# Patient Record
Sex: FEMALE | Race: BLACK OR AFRICAN AMERICAN | NOT HISPANIC OR LATINO | Employment: UNEMPLOYED | URBAN - METROPOLITAN AREA
[De-identification: names, ages, dates, MRNs, and addresses within clinical notes are randomized per-mention and may not be internally consistent; named-entity substitution may affect disease eponyms.]

---
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
## 2024-04-15 PROBLEM — N94.6 DYSMENORRHEA: Status: ACTIVE | Noted: 2024-04-15

## 2024-05-06 ENCOUNTER — TELEPHONE (OUTPATIENT)
Dept: OBSTETRICS AND GYNECOLOGY | Facility: CLINIC | Age: 19
End: 2024-05-06
Payer: MEDICAID

## 2024-05-06 ENCOUNTER — OFFICE VISIT (OUTPATIENT)
Dept: OBSTETRICS AND GYNECOLOGY | Facility: CLINIC | Age: 19
End: 2024-05-06
Payer: MEDICAID

## 2024-05-06 ENCOUNTER — LAB VISIT (OUTPATIENT)
Dept: LAB | Facility: HOSPITAL | Age: 19
End: 2024-05-06
Attending: ADVANCED PRACTICE MIDWIFE
Payer: MEDICAID

## 2024-05-06 VITALS
BODY MASS INDEX: 25.9 KG/M2 | DIASTOLIC BLOOD PRESSURE: 72 MMHG | HEIGHT: 66 IN | WEIGHT: 161.19 LBS | SYSTOLIC BLOOD PRESSURE: 114 MMHG

## 2024-05-06 DIAGNOSIS — N91.1 SECONDARY AMENORRHEA: Primary | ICD-10-CM

## 2024-05-06 DIAGNOSIS — N91.1 SECONDARY AMENORRHEA: ICD-10-CM

## 2024-05-06 DIAGNOSIS — Z32.01 POSITIVE PREGNANCY TEST: ICD-10-CM

## 2024-05-06 LAB
ABO + RH BLD: NORMAL
BLD GP AB SCN CELLS X3 SERPL QL: NORMAL
ERYTHROCYTE [DISTWIDTH] IN BLOOD BY AUTOMATED COUNT: 21.9 % (ref 11.5–14.5)
HAV IGM SERPL QL IA: NORMAL
HBV CORE IGM SERPL QL IA: NORMAL
HBV SURFACE AG SERPL QL IA: NORMAL
HCT VFR BLD AUTO: 29.8 % (ref 37–48.5)
HCV AB SERPL QL IA: NORMAL
HGB BLD-MCNC: 8.6 G/DL (ref 12–16)
HGB S BLD QL SOLY: NEGATIVE
HIV 1+2 AB+HIV1 P24 AG SERPL QL IA: NORMAL
MCH RBC QN AUTO: 20.4 PG (ref 27–31)
MCHC RBC AUTO-ENTMCNC: 28.9 G/DL (ref 32–36)
MCV RBC AUTO: 71 FL (ref 82–98)
PLATELET # BLD AUTO: 347 K/UL (ref 150–450)
PMV BLD AUTO: 11.3 FL (ref 9.2–12.9)
RBC # BLD AUTO: 4.22 M/UL (ref 4–5.4)
SPECIMEN OUTDATE: NORMAL
TREPONEMA PALLIDUM IGG+IGM AB [PRESENCE] IN SERUM OR PLASMA BY IMMUNOASSAY: NONREACTIVE
WBC # BLD AUTO: 6.64 K/UL (ref 3.9–12.7)

## 2024-05-06 PROCEDURE — 1159F MED LIST DOCD IN RCRD: CPT | Mod: CPTII,,, | Performed by: ADVANCED PRACTICE MIDWIFE

## 2024-05-06 PROCEDURE — 99999 PR PBB SHADOW E&M-EST. PATIENT-LVL II: CPT | Mod: PBBFAC,,, | Performed by: ADVANCED PRACTICE MIDWIFE

## 2024-05-06 PROCEDURE — 85027 COMPLETE CBC AUTOMATED: CPT | Performed by: ADVANCED PRACTICE MIDWIFE

## 2024-05-06 PROCEDURE — 86593 SYPHILIS TEST NON-TREP QUANT: CPT | Performed by: ADVANCED PRACTICE MIDWIFE

## 2024-05-06 PROCEDURE — 87086 URINE CULTURE/COLONY COUNT: CPT | Performed by: ADVANCED PRACTICE MIDWIFE

## 2024-05-06 PROCEDURE — 99212 OFFICE O/P EST SF 10 MIN: CPT | Mod: PBBFAC,TH | Performed by: ADVANCED PRACTICE MIDWIFE

## 2024-05-06 PROCEDURE — 86762 RUBELLA ANTIBODY: CPT | Performed by: ADVANCED PRACTICE MIDWIFE

## 2024-05-06 PROCEDURE — 3074F SYST BP LT 130 MM HG: CPT | Mod: CPTII,,, | Performed by: ADVANCED PRACTICE MIDWIFE

## 2024-05-06 PROCEDURE — 3008F BODY MASS INDEX DOCD: CPT | Mod: CPTII,,, | Performed by: ADVANCED PRACTICE MIDWIFE

## 2024-05-06 PROCEDURE — 80074 ACUTE HEPATITIS PANEL: CPT | Performed by: ADVANCED PRACTICE MIDWIFE

## 2024-05-06 PROCEDURE — 86901 BLOOD TYPING SEROLOGIC RH(D): CPT | Performed by: ADVANCED PRACTICE MIDWIFE

## 2024-05-06 PROCEDURE — 87389 HIV-1 AG W/HIV-1&-2 AB AG IA: CPT | Performed by: ADVANCED PRACTICE MIDWIFE

## 2024-05-06 PROCEDURE — 3078F DIAST BP <80 MM HG: CPT | Mod: CPTII,,, | Performed by: ADVANCED PRACTICE MIDWIFE

## 2024-05-06 PROCEDURE — 85660 RBC SICKLE CELL TEST: CPT | Performed by: ADVANCED PRACTICE MIDWIFE

## 2024-05-06 PROCEDURE — 99204 OFFICE O/P NEW MOD 45 MIN: CPT | Mod: TH,S$PBB,, | Performed by: ADVANCED PRACTICE MIDWIFE

## 2024-05-06 PROCEDURE — 36415 COLL VENOUS BLD VENIPUNCTURE: CPT | Performed by: ADVANCED PRACTICE MIDWIFE

## 2024-05-06 NOTE — TELEPHONE ENCOUNTER
Left voicemail for patient stating that the provider does not see pregnancy confirmations and I can get her rescheduled with one of our midwives.

## 2024-05-06 NOTE — PROGRESS NOTES
CHIEF COMPLAINT:   Patient presents with      Possible Pregnancy        HISTORY OF PRESENT ILLNESS  Maria De Jesus Payne 19 y.o.  presents for pregnancy risk assessment.   The patient has no complaints today.  No nausea or vomiting. No bleeding or pain.  Pregnancy was not  planned but is desired.  Partner is supportive of pregnancy.  Lives at home with her boyfriend.  Has two dogs at home.  Currently stays at home.  Denies domestic abuse.  Denies chemical/pesticide/radiation exposure.  OB history:  [place]     LMP: Patient's last menstrual period was 2024.  EDC: Estimated Date of Delivery: None noted.  EGA: 4w4d      Health Maintenance   Topic Date Due    Hepatitis C Screening  Never done    Lipid Panel  Never done    Chlamydia Screening  Never done    TETANUS VACCINE  2024    Pap Smear  04/15/2027    HPV Vaccines  Completed       Past Medical History:   Diagnosis Date    ASCUS of cervix with negative high risk HPV 04/15/2024    Repeat pap in 6 months, 10/22/24    Irregular menstrual cycle     Labial cyst     lanced       No past surgical history on file.    Family History   Problem Relation Name Age of Onset    Diabetes Father      No Known Problems Mother         Social History     Socioeconomic History    Marital status: Single   Tobacco Use    Smoking status: Never    Smokeless tobacco: Never   Substance and Sexual Activity    Alcohol use: Never    Drug use: Not Currently    Sexual activity: Yes     Partners: Male       No current outpatient medications on file.     No current facility-administered medications for this visit.       Review of patient's allergies indicates:  No Known Allergies      PHYSICAL EXAM   Vitals:    24 1445   BP: 114/72        PAIN SCALE: 0/10 None    PHYSICAL EXAM    ROS:  GENERAL: No fever, chills, fatigability or weight loss.  CV: Denies chest pain  PULM: Denies shortness of breath or wheezing.  ABDOMEN: Appetite fine. No weight loss. Denies diarrhea,  abdominal pain, hematemesis or blood in stool.  URINARY: No flank pain, dysuria or hematuria.  REPRODUCTIVE: No abnormal vaginal bleeding.       PE:   Had WWE 3 weeks ago.     UPT +    A/P:     -      Patient was counseled today on A.C.S. Pap guidelines and recommendations for yearly pelvic exams, mammograms and monthly self breast exams; to see her PCP for other health maintenance and pregnancy.   -      Patient's medications and medical history reviewed with patient and implications in pregnancy.   -      Pregnancy course discussed and 'AtoZ' book given. Patient was counseled on proper weight gain based on the [facility]'s recommendations based on her pre-pregnancy weight. Discussed foods to avoid in pregnancy (i.e. sushi, fish that are high in mercury, deli meat, and unpasteurized cheeses). Discussed prenatal vitamin options (i.e. stool softener, DHA). Discussed potential medical problems in pregnancy.  -     Discussed risk of Toxoplasmosis transmission from pets and reviewed risk reduction techniques.  -     Pt was counseled on exercise in pregnancy and weight gain recommendations.  -     Oriented to practice including CNM collaboration.   -     Follow-up initial OB, with labs and u/s.       Secondary amenorrhea  -     CBC Without Differential; Future; Expected date: 05/06/2024  -     Hepatitis Panel, Acute; Future; Expected date: 05/06/2024  -     HIV 1/2 Ag/Ab (4th Gen); Future; Expected date: 05/06/2024  -     Rubella Antibody, IgG; Future; Expected date: 05/06/2024  -     Treponema Pallidium Antibodies IgG, IgM; Future; Expected date: 05/06/2024  -     Type & Screen; Future; Expected date: 05/06/2024  -     Urine culture  -     US OB/GYN Procedure ([facility]); Future  -     Sickle Cell Screen; Future; Expected date: 05/06/2024  -     C. trachomatis/N. gonorrhoeae by AMP DNA [facility]; Urine    Positive pregnancy test  -     CBC Without Differential; Future; Expected date: 05/06/2024  -     Hepatitis  Panel, Acute; Future; Expected date: 05/06/2024  -     HIV 1/2 Ag/Ab (4th Gen); Future; Expected date: 05/06/2024  -     Rubella Antibody, IgG; Future; Expected date: 05/06/2024  -     Treponema Pallidium Antibodies IgG, IgM; Future; Expected date: 05/06/2024  -     Type & Screen; Future; Expected date: 05/06/2024  -     Urine culture  -     US OB/GYN Procedure ([facility]); Future  -     Sickle Cell Screen; Future; Expected date: 05/06/2024  -     C. trachomatis/N. gonorrhoeae by AMP DNA [facility]; Urine

## 2024-05-07 ENCOUNTER — PATIENT MESSAGE (OUTPATIENT)
Dept: OBSTETRICS AND GYNECOLOGY | Facility: CLINIC | Age: 19
End: 2024-05-07
Payer: MEDICAID

## 2024-05-07 DIAGNOSIS — O99.019 ANTEPARTUM ANEMIA: Primary | ICD-10-CM

## 2024-05-07 LAB
RUBV IGG SER-ACNC: 65 IU/ML
RUBV IGG SER-IMP: REACTIVE

## 2024-05-07 RX ORDER — FERROUS SULFATE 325(65) MG
325 TABLET ORAL 3 TIMES DAILY
Qty: 90 TABLET | Refills: 11 | Status: SHIPPED | OUTPATIENT
Start: 2024-05-07

## 2024-05-08 ENCOUNTER — TELEPHONE (OUTPATIENT)
Dept: HEMATOLOGY/ONCOLOGY | Facility: CLINIC | Age: 19
End: 2024-05-08
Payer: MEDICAID

## 2024-05-08 DIAGNOSIS — O99.019 ANTEPARTUM ANEMIA: Primary | ICD-10-CM

## 2024-05-08 NOTE — TELEPHONE ENCOUNTER
Spoke to patient in reference to Hematology referral from Angy Rushing CNM.  Appointment scheduled per patient's request next available.  Appointment notice via pt portal.

## 2024-05-09 ENCOUNTER — PATIENT MESSAGE (OUTPATIENT)
Dept: OBSTETRICS AND GYNECOLOGY | Facility: CLINIC | Age: 19
End: 2024-05-09
Payer: MEDICAID

## 2024-05-09 LAB
BACTERIA UR CULT: NORMAL
BACTERIA UR CULT: NORMAL

## 2024-05-13 ENCOUNTER — LAB VISIT (OUTPATIENT)
Dept: LAB | Facility: HOSPITAL | Age: 19
End: 2024-05-13
Attending: INTERNAL MEDICINE
Payer: MEDICAID

## 2024-05-13 DIAGNOSIS — O99.019 ANTEPARTUM ANEMIA: ICD-10-CM

## 2024-05-13 LAB
FERRITIN SERPL-MCNC: 6 NG/ML (ref 20–300)
IRON SERPL-MCNC: 20 UG/DL (ref 30–160)
SATURATED IRON: 4 % (ref 20–50)
TOTAL IRON BINDING CAPACITY: 514 UG/DL (ref 250–450)
TRANSFERRIN SERPL-MCNC: 347 MG/DL (ref 200–375)

## 2024-05-13 PROCEDURE — 36415 COLL VENOUS BLD VENIPUNCTURE: CPT | Performed by: INTERNAL MEDICINE

## 2024-05-13 PROCEDURE — 82728 ASSAY OF FERRITIN: CPT | Performed by: INTERNAL MEDICINE

## 2024-05-13 PROCEDURE — 83540 ASSAY OF IRON: CPT | Performed by: INTERNAL MEDICINE

## 2024-05-14 ENCOUNTER — TELEPHONE (OUTPATIENT)
Dept: HEMATOLOGY/ONCOLOGY | Facility: CLINIC | Age: 19
End: 2024-05-14
Payer: MEDICAID

## 2024-05-15 ENCOUNTER — PATIENT MESSAGE (OUTPATIENT)
Dept: HEMATOLOGY/ONCOLOGY | Facility: CLINIC | Age: 19
End: 2024-05-15
Payer: MEDICAID

## 2024-05-15 ENCOUNTER — OFFICE VISIT (OUTPATIENT)
Dept: HEMATOLOGY/ONCOLOGY | Facility: CLINIC | Age: 19
End: 2024-05-15
Payer: MEDICAID

## 2024-05-15 DIAGNOSIS — O99.019 ANTEPARTUM ANEMIA: ICD-10-CM

## 2024-05-15 PROCEDURE — 99204 OFFICE O/P NEW MOD 45 MIN: CPT | Mod: 95,,, | Performed by: INTERNAL MEDICINE

## 2024-05-15 RX ORDER — FERROUS SULFATE 300 MG/5ML
300 LIQUID (ML) ORAL DAILY
Qty: 150 ML | Refills: 3 | Status: SHIPPED | OUTPATIENT
Start: 2024-05-15 | End: 2025-05-15

## 2024-05-15 NOTE — PROGRESS NOTES
The patient location is: home  Visit type: Virtual visit with synchronous audio and video  Face-to-face or time spent with patient on the encounter: 25 min  Total time spent on and for  this encounter which includes non face-to-face time preparing to see patient, review of tests, obtaining and or reviewing separately obtained records documenting clinical information in the electronic or other health records, independently interpreting results which is not separately reported ,and communicating results to the patient/family/caregiver and in care coordination and treatment planning/communicating with pharmacy for prescriptions/addressing social needs/arranging follow-up and or referrals : 25 min     Each patient I provide medical services by telemedicine is:  (1) informed of the relationship between the physician and patient and the respective role of any other health care provider with respect to management of the patient; and (2) notified that he or she may decline to receive medical services by telemedicine and may withdraw from such care at any time.  This is a video visit therefore some elements of the physical exam such as vital signs, heart sounds are breath sounds are not included and may be included if found in recent clinic notes of other providers assessing same patient. Any symptoms or signs that were visualized were stated by the patient may be included in this note.      Service Date:  5/15/24    Chief Complaint: iron deficiency    Maria De Jesus Payne is a 19 y.o. female with iron deficiency during her pregnancy. Has tried oral iron tablets but vomited them out. Takes prenatal but doesn't have iron in it. Only 5 weeks along in her pregnancy. No complaints to me.    Review of Systems   Constitutional: Negative.  Negative for appetite change and unexpected weight change.   HENT: Negative.  Negative for mouth sores.    Eyes: Negative.  Negative for visual disturbance.   Respiratory:  Positive for  shortness of breath. Negative for cough.    Cardiovascular: Negative.  Negative for chest pain.   Gastrointestinal: Negative.  Negative for abdominal pain and diarrhea.   Endocrine: Negative.    Genitourinary:  Positive for frequency.   Musculoskeletal: Negative.  Negative for back pain.   Integumentary:  Negative for rash. Negative.   Neurological: Negative.  Negative for headaches.   Hematological: Negative.  Negative for adenopathy.   Psychiatric/Behavioral: Negative.  The patient is not nervous/anxious.         Current Outpatient Medications   Medication Instructions    ferrous sulfate (IRON) 325 mg, Oral, 3 times daily    ferrous sulfate 300 mg, Oral, Daily        Past Medical History:   Diagnosis Date    ASCUS of cervix with negative high risk HPV 04/15/2024    Repeat pap in 6 months, 10/22/24    Irregular menstrual cycle     Labial cyst 2022    lanced        No past surgical history on file.     Family History   Problem Relation Name Age of Onset    Diabetes Father      No Known Problems Mother         Social History     Tobacco Use    Smoking status: Never    Smokeless tobacco: Never   Substance Use Topics    Alcohol use: Never    Drug use: Not Currently         There were no vitals filed for this visit.     Physical Exam:  LMP 04/04/2024     Physical Exam  Constitutional:       Appearance: Normal appearance.   HENT:      Head: Normocephalic and atraumatic.      Nose: Nose normal.      Mouth/Throat:      Mouth: Mucous membranes are moist.      Pharynx: Oropharynx is clear.   Eyes:      Conjunctiva/sclera: Conjunctivae normal.   Cardiovascular:      Rate and Rhythm: Normal rate and regular rhythm.      Heart sounds: Normal heart sounds.   Pulmonary:      Effort: Pulmonary effort is normal.      Breath sounds: Normal breath sounds.   Abdominal:      General: Abdomen is flat. Bowel sounds are normal.      Palpations: Abdomen is soft.   Musculoskeletal:         General: Normal range of motion.      Cervical  back: Normal range of motion and neck supple.   Skin:     General: Skin is warm and dry.   Neurological:      General: No focal deficit present.      Mental Status: She is alert and oriented to person, place, and time. Mental status is at baseline.   Psychiatric:         Mood and Affect: Mood normal.        Labs:  Lab Results   Component Value Date    WBC 6.64 05/06/2024    RBC 4.22 05/06/2024    HGB 8.6 (L) 05/06/2024    HCT 29.8 (L) 05/06/2024    MCV 71 (L) 05/06/2024    MCH 20.4 (L) 05/06/2024    MCHC 28.9 (L) 05/06/2024    RDW 21.9 (H) 05/06/2024     05/06/2024    MPV 11.3 05/06/2024    GRAN 2.4 08/11/2020    GRAN 56.5 08/11/2020    LYMPH 1.5 08/11/2020    LYMPH 34.4 08/11/2020    MONO 0.3 08/11/2020    MONO 7.3 08/11/2020    EOS 0.1 08/11/2020    BASO 0.01 08/11/2020    EOSINOPHIL 1.4 08/11/2020    BASOPHIL 0.2 08/11/2020     Sodium   Date Value Ref Range Status   08/11/2020 142 136 - 145 mmol/L Final     Potassium   Date Value Ref Range Status   08/11/2020 4.1 3.5 - 5.1 mmol/L Final     Chloride   Date Value Ref Range Status   08/11/2020 106 95 - 110 mmol/L Final     CO2   Date Value Ref Range Status   08/11/2020 25 23 - 29 mmol/L Final     Glucose   Date Value Ref Range Status   08/11/2020 95 70 - 110 mg/dL Final     BUN   Date Value Ref Range Status   08/11/2020 6 5 - 18 mg/dL Final     Creatinine   Date Value Ref Range Status   08/11/2020 0.8 0.5 - 1.4 mg/dL Final     Calcium   Date Value Ref Range Status   08/11/2020 9.8 8.7 - 10.5 mg/dL Final     Total Protein   Date Value Ref Range Status   08/11/2020 8.1 6.0 - 8.4 g/dL Final     Albumin   Date Value Ref Range Status   08/11/2020 3.9 3.2 - 4.7 g/dL Final     Total Bilirubin   Date Value Ref Range Status   08/11/2020 0.2 0.1 - 1.0 mg/dL Final     Comment:     For infants and newborns, interpretation of results should be based  on gestational age, weight and in agreement with clinical  observations.  Premature Infant recommended reference  ranges:  Up to 24 hours.............<8.0 mg/dL  Up to 48 hours............<12.0 mg/dL  3-5 days..................<15.0 mg/dL  6-29 days.................<15.0 mg/dL       Alkaline Phosphatase   Date Value Ref Range Status   08/11/2020 107 (H) 48 - 95 U/L Final     AST   Date Value Ref Range Status   08/11/2020 19 10 - 40 U/L Final     ALT   Date Value Ref Range Status   08/11/2020 21 10 - 44 U/L Final     Anion Gap   Date Value Ref Range Status   08/11/2020 11 8 - 16 mmol/L Final     eGFR if    Date Value Ref Range Status   08/11/2020 SEE COMMENT >60 mL/min/1.73 m^2 Final     eGFR if non    Date Value Ref Range Status   08/11/2020 SEE COMMENT >60 mL/min/1.73 m^2 Final     Comment:     Calculation used to obtain the estimated glomerular filtration  rate (eGFR) is the CKD-EPI equation.   Test not performed.  GFR calculation is only valid for patients   18 and older.         A/P:    Iron deficiency anemia  - due to her pregnancy  - she can't have IV iron until her 2nd trimester  - I will give her liquid oral iron to see if she can tolerate that better. Otherwise, she will try OTC gummies.  -RTC in 6 weeks with labs      Aurash Khoobehi, MD  Hematology and Oncology

## 2024-05-28 ENCOUNTER — INITIAL PRENATAL (OUTPATIENT)
Dept: OBSTETRICS AND GYNECOLOGY | Facility: CLINIC | Age: 19
End: 2024-05-28
Payer: MEDICAID

## 2024-05-28 ENCOUNTER — PROCEDURE VISIT (OUTPATIENT)
Dept: OBSTETRICS AND GYNECOLOGY | Facility: CLINIC | Age: 19
End: 2024-05-28
Payer: MEDICAID

## 2024-05-28 VITALS
DIASTOLIC BLOOD PRESSURE: 66 MMHG | BODY MASS INDEX: 26.01 KG/M2 | SYSTOLIC BLOOD PRESSURE: 120 MMHG | WEIGHT: 161.19 LBS

## 2024-05-28 DIAGNOSIS — N91.1 SECONDARY AMENORRHEA: ICD-10-CM

## 2024-05-28 DIAGNOSIS — O99.019 ANTEPARTUM ANEMIA: Primary | ICD-10-CM

## 2024-05-28 DIAGNOSIS — Z34.01 ENCOUNTER FOR SUPERVISION OF NORMAL FIRST PREGNANCY IN FIRST TRIMESTER: ICD-10-CM

## 2024-05-28 DIAGNOSIS — Z32.01 POSITIVE PREGNANCY TEST: ICD-10-CM

## 2024-05-28 PROBLEM — Z34.91 ENCOUNTER FOR SUPERVISION OF NORMAL PREGNANCY IN FIRST TRIMESTER: Status: ACTIVE | Noted: 2024-05-28

## 2024-05-28 PROCEDURE — 76801 OB US < 14 WKS SINGLE FETUS: CPT | Mod: PBBFAC,H | Performed by: OBSTETRICS & GYNECOLOGY

## 2024-05-28 PROCEDURE — 99212 OFFICE O/P EST SF 10 MIN: CPT | Mod: PBBFAC,TH | Performed by: ADVANCED PRACTICE MIDWIFE

## 2024-05-28 PROCEDURE — 99214 OFFICE O/P EST MOD 30 MIN: CPT | Mod: 25,TH,S$PBB, | Performed by: ADVANCED PRACTICE MIDWIFE

## 2024-05-28 PROCEDURE — 99999 PR PBB SHADOW E&M-EST. PATIENT-LVL II: CPT | Mod: PBBFAC,,, | Performed by: ADVANCED PRACTICE MIDWIFE

## 2024-05-28 NOTE — PROGRESS NOTES
19 y.o. female  at 7w6d   denies VB or cramping  Oriented to our practice, JONO/MD collaborative care.  Instructed to start prenatal vitamin.   Blue bag info reviewed.  Dietary recommendations made.     US today shows SIUP at 7w6d. Uterus and cervix appear WNLs. Right corpus luteal cyst noted measuring 2.5x1.7x2.0cm.  Doing well without concerns. Feeling good overall.   First trimester s/s improving  Would like genetic screen nv.     TW lbs   Reviewed prenatal labs, seeing hematology for anemia.       Antepartum anemia    Encounter for supervision of normal first pregnancy in first trimester  -Reviewed NOB labs  -Reviewed NOB US  NOB consents signed.        Reviewed warning signs, pregnancy precautions and how/when to call.  RTC x 4 wks, call or present sooner prn.

## 2024-06-19 ENCOUNTER — TELEPHONE (OUTPATIENT)
Dept: HEMATOLOGY/ONCOLOGY | Facility: CLINIC | Age: 19
End: 2024-06-19
Payer: MEDICAID

## 2024-06-24 ENCOUNTER — LAB VISIT (OUTPATIENT)
Dept: LAB | Facility: HOSPITAL | Age: 19
End: 2024-06-24
Attending: INTERNAL MEDICINE
Payer: MEDICAID

## 2024-06-24 DIAGNOSIS — O99.019 ANTEPARTUM ANEMIA: ICD-10-CM

## 2024-06-24 LAB
BASOPHILS # BLD AUTO: 0.01 K/UL (ref 0–0.2)
BASOPHILS NFR BLD: 0.1 % (ref 0–1.9)
DIFFERENTIAL METHOD BLD: ABNORMAL
EOSINOPHIL # BLD AUTO: 0.1 K/UL (ref 0–0.5)
EOSINOPHIL NFR BLD: 1.3 % (ref 0–8)
ERYTHROCYTE [DISTWIDTH] IN BLOOD BY AUTOMATED COUNT: 25.4 % (ref 11.5–14.5)
FERRITIN SERPL-MCNC: 17 NG/ML (ref 20–300)
HCT VFR BLD AUTO: 34.2 % (ref 37–48.5)
HGB BLD-MCNC: 9.8 G/DL (ref 12–16)
IMM GRANULOCYTES # BLD AUTO: 0.03 K/UL (ref 0–0.04)
IMM GRANULOCYTES NFR BLD AUTO: 0.4 % (ref 0–0.5)
LYMPHOCYTES # BLD AUTO: 1.8 K/UL (ref 1–4.8)
LYMPHOCYTES NFR BLD: 23 % (ref 18–48)
MCH RBC QN AUTO: 21.5 PG (ref 27–31)
MCHC RBC AUTO-ENTMCNC: 28.7 G/DL (ref 32–36)
MCV RBC AUTO: 75 FL (ref 82–98)
MONOCYTES # BLD AUTO: 0.5 K/UL (ref 0.3–1)
MONOCYTES NFR BLD: 6.8 % (ref 4–15)
NEUTROPHILS # BLD AUTO: 5.4 K/UL (ref 1.8–7.7)
NEUTROPHILS NFR BLD: 68.4 % (ref 38–73)
NRBC BLD-RTO: 0 /100 WBC
PLATELET # BLD AUTO: 304 K/UL (ref 150–450)
PMV BLD AUTO: ABNORMAL FL (ref 9.2–12.9)
RBC # BLD AUTO: 4.55 M/UL (ref 4–5.4)
WBC # BLD AUTO: 7.84 K/UL (ref 3.9–12.7)

## 2024-06-24 PROCEDURE — 36415 COLL VENOUS BLD VENIPUNCTURE: CPT | Performed by: INTERNAL MEDICINE

## 2024-06-24 PROCEDURE — 85025 COMPLETE CBC W/AUTO DIFF WBC: CPT | Performed by: INTERNAL MEDICINE

## 2024-06-24 PROCEDURE — 82728 ASSAY OF FERRITIN: CPT | Performed by: INTERNAL MEDICINE

## 2024-06-25 ENCOUNTER — ROUTINE PRENATAL (OUTPATIENT)
Dept: OBSTETRICS AND GYNECOLOGY | Facility: CLINIC | Age: 19
End: 2024-06-25
Payer: MEDICAID

## 2024-06-25 ENCOUNTER — PATIENT MESSAGE (OUTPATIENT)
Dept: ADMINISTRATIVE | Facility: OTHER | Age: 19
End: 2024-06-25
Payer: MEDICAID

## 2024-06-25 ENCOUNTER — LAB VISIT (OUTPATIENT)
Dept: LAB | Facility: HOSPITAL | Age: 19
End: 2024-06-25
Attending: ADVANCED PRACTICE MIDWIFE
Payer: MEDICAID

## 2024-06-25 VITALS
BODY MASS INDEX: 26.87 KG/M2 | DIASTOLIC BLOOD PRESSURE: 84 MMHG | WEIGHT: 166.44 LBS | SYSTOLIC BLOOD PRESSURE: 110 MMHG

## 2024-06-25 DIAGNOSIS — O99.019 ANTEPARTUM ANEMIA: ICD-10-CM

## 2024-06-25 DIAGNOSIS — Z34.01 ENCOUNTER FOR SUPERVISION OF NORMAL FIRST PREGNANCY IN FIRST TRIMESTER: ICD-10-CM

## 2024-06-25 DIAGNOSIS — Z34.01 ENCOUNTER FOR SUPERVISION OF NORMAL FIRST PREGNANCY IN FIRST TRIMESTER: Primary | ICD-10-CM

## 2024-06-25 PROCEDURE — 36415 COLL VENOUS BLD VENIPUNCTURE: CPT | Performed by: ADVANCED PRACTICE MIDWIFE

## 2024-06-25 PROCEDURE — 99214 OFFICE O/P EST MOD 30 MIN: CPT | Mod: TH,S$PBB,, | Performed by: ADVANCED PRACTICE MIDWIFE

## 2024-06-25 PROCEDURE — 87591 N.GONORRHOEAE DNA AMP PROB: CPT | Performed by: ADVANCED PRACTICE MIDWIFE

## 2024-06-25 PROCEDURE — 99999 PR PBB SHADOW E&M-EST. PATIENT-LVL II: CPT | Mod: PBBFAC,,, | Performed by: ADVANCED PRACTICE MIDWIFE

## 2024-06-25 PROCEDURE — 87491 CHLMYD TRACH DNA AMP PROBE: CPT | Performed by: ADVANCED PRACTICE MIDWIFE

## 2024-06-25 PROCEDURE — 99212 OFFICE O/P EST SF 10 MIN: CPT | Mod: PBBFAC,TH | Performed by: ADVANCED PRACTICE MIDWIFE

## 2024-06-25 NOTE — PROGRESS NOTES
19 y.o. female  at 11w6d   denies VB or cramping  Doing well without concerns. Has been seeing hemonc for early anemia in pregnancy. Having difficulty keeping 352mg iron down. Pt has been taking 18mg iron. Discussed elemental iron consumption. Will f/u closely, discussed possible iron transfusions as option if h/h worsens.  Discussed increasing water and electrolyte to improve discomforts in pregnancy.    First trimester s/s improving    TW lbs   Reviewed prenatal labs Iron is increasing  Genetic testing done today  Enrolled in connected moms    Encounter for supervision of normal first pregnancy in first trimester  -     Connected MOM Enrollment  -     Assign Connected MOM Program Consent Questionnaire  -     Prenatal Miscellaneous Test, Blood; Future; Expected date: 2024    Antepartum anemia         Reviewed warning signs, pregnancy precautions and how/when to call.  RTC x 4 wks, call or present sooner paola.     ARI Velasquez

## 2024-06-26 ENCOUNTER — OFFICE VISIT (OUTPATIENT)
Dept: HEMATOLOGY/ONCOLOGY | Facility: CLINIC | Age: 19
End: 2024-06-26
Payer: MEDICAID

## 2024-06-26 DIAGNOSIS — O99.019 ANTEPARTUM ANEMIA: Primary | ICD-10-CM

## 2024-06-26 LAB
C TRACH DNA SPEC QL NAA+PROBE: NOT DETECTED
N GONORRHOEA DNA SPEC QL NAA+PROBE: NOT DETECTED

## 2024-06-26 NOTE — PROGRESS NOTES
The patient location is: home  Visit type: Virtual visit with synchronous audio and video  Face-to-face or time spent with patient on the encounter: 25 min  Total time spent on and for  this encounter which includes non face-to-face time preparing to see patient, review of tests, obtaining and or reviewing separately obtained records documenting clinical information in the electronic or other health records, independently interpreting results which is not separately reported ,and communicating results to the patient/family/caregiver and in care coordination and treatment planning/communicating with pharmacy for prescriptions/addressing social needs/arranging follow-up and or referrals : 25 min     Each patient I provide medical services by telemedicine is:  (1) informed of the relationship between the physician and patient and the respective role of any other health care provider with respect to management of the patient; and (2) notified that he or she may decline to receive medical services by telemedicine and may withdraw from such care at any time.  This is a video visit therefore some elements of the physical exam such as vital signs, heart sounds are breath sounds are not included and may be included if found in recent clinic notes of other providers assessing same patient. Any symptoms or signs that were visualized were stated by the patient may be included in this note.      Service Date:  6/26/24    Chief Complaint: iron deficiency anemia    Maria De Jesus Payne is a 19 y.o. female here with iron deficiency anemia secondary to pregnancy.  Patient has been taking oral iron gummies as it is the only type of oral iron she is able to hold down.  Her iron levels are improving on this, but she wanted to see if she needed an iron infusion or if these gummies were enough.  She is doing well with this.  No complaints.  She is about 12 weeks pregnant.    Review of Systems   Constitutional: Negative.  Negative for  appetite change and unexpected weight change.   HENT: Negative.  Negative for mouth sores.    Eyes: Negative.  Negative for visual disturbance.   Respiratory: Negative.  Negative for cough and shortness of breath.    Cardiovascular: Negative.  Negative for chest pain.   Gastrointestinal: Negative.  Negative for abdominal pain and diarrhea.   Endocrine: Negative.    Genitourinary:  Positive for frequency.   Musculoskeletal: Negative.  Negative for back pain.   Integumentary:  Negative for rash. Negative.   Neurological: Negative.  Negative for headaches.   Hematological: Negative.  Negative for adenopathy.   Psychiatric/Behavioral: Negative.  The patient is not nervous/anxious.         Current Outpatient Medications   Medication Instructions    ferrous sulfate (IRON) 325 mg, Oral, 3 times daily    ferrous sulfate 300 mg, Oral, Daily        Past Medical History:   Diagnosis Date    ASCUS of cervix with negative high risk HPV 04/15/2024    Repeat pap in 6 months, 10/22/24    Irregular menstrual cycle     Labial cyst 2022    lanced        No past surgical history on file.     Family History   Problem Relation Name Age of Onset    Diabetes Father      No Known Problems Mother         Social History     Tobacco Use    Smoking status: Never    Smokeless tobacco: Never   Substance Use Topics    Alcohol use: Never    Drug use: Not Currently         There were no vitals filed for this visit.     Physical Exam:  LMP 04/04/2024     Physical Exam  Constitutional:       Appearance: Normal appearance.   HENT:      Head: Normocephalic and atraumatic.      Nose: Nose normal.      Mouth/Throat:      Mouth: Mucous membranes are moist.      Pharynx: Oropharynx is clear.   Eyes:      Conjunctiva/sclera: Conjunctivae normal.   Cardiovascular:      Rate and Rhythm: Normal rate and regular rhythm.      Heart sounds: Normal heart sounds.   Pulmonary:      Effort: Pulmonary effort is normal.      Breath sounds: Normal breath sounds.    Abdominal:      General: Abdomen is flat. Bowel sounds are normal.      Palpations: Abdomen is soft.   Musculoskeletal:         General: Normal range of motion.      Cervical back: Normal range of motion and neck supple.   Skin:     General: Skin is warm and dry.   Neurological:      General: No focal deficit present.      Mental Status: She is alert and oriented to person, place, and time. Mental status is at baseline.   Psychiatric:         Mood and Affect: Mood normal.          Labs:  Lab Results   Component Value Date    WBC 7.84 06/24/2024    RBC 4.55 06/24/2024    HGB 9.8 (L) 06/24/2024    HCT 34.2 (L) 06/24/2024    MCV 75 (L) 06/24/2024    MCH 21.5 (L) 06/24/2024    MCHC 28.7 (L) 06/24/2024    RDW 25.4 (H) 06/24/2024     06/24/2024    MPV SEE COMMENT 06/24/2024    GRAN 5.4 06/24/2024    GRAN 68.4 06/24/2024    LYMPH 1.8 06/24/2024    LYMPH 23.0 06/24/2024    MONO 0.5 06/24/2024    MONO 6.8 06/24/2024    EOS 0.1 06/24/2024    BASO 0.01 06/24/2024    EOSINOPHIL 1.3 06/24/2024    BASOPHIL 0.1 06/24/2024     Sodium   Date Value Ref Range Status   08/11/2020 142 136 - 145 mmol/L Final     Potassium   Date Value Ref Range Status   08/11/2020 4.1 3.5 - 5.1 mmol/L Final     Chloride   Date Value Ref Range Status   08/11/2020 106 95 - 110 mmol/L Final     CO2   Date Value Ref Range Status   08/11/2020 25 23 - 29 mmol/L Final     Glucose   Date Value Ref Range Status   08/11/2020 95 70 - 110 mg/dL Final     BUN   Date Value Ref Range Status   08/11/2020 6 5 - 18 mg/dL Final     Creatinine   Date Value Ref Range Status   08/11/2020 0.8 0.5 - 1.4 mg/dL Final     Calcium   Date Value Ref Range Status   08/11/2020 9.8 8.7 - 10.5 mg/dL Final     Total Protein   Date Value Ref Range Status   08/11/2020 8.1 6.0 - 8.4 g/dL Final     Albumin   Date Value Ref Range Status   08/11/2020 3.9 3.2 - 4.7 g/dL Final     Total Bilirubin   Date Value Ref Range Status   08/11/2020 0.2 0.1 - 1.0 mg/dL Final     Comment:     For  infants and newborns, interpretation of results should be based  on gestational age, weight and in agreement with clinical  observations.  Premature Infant recommended reference ranges:  Up to 24 hours.............<8.0 mg/dL  Up to 48 hours............<12.0 mg/dL  3-5 days..................<15.0 mg/dL  6-29 days.................<15.0 mg/dL       Alkaline Phosphatase   Date Value Ref Range Status   08/11/2020 107 (H) 48 - 95 U/L Final     AST   Date Value Ref Range Status   08/11/2020 19 10 - 40 U/L Final     ALT   Date Value Ref Range Status   08/11/2020 21 10 - 44 U/L Final     Anion Gap   Date Value Ref Range Status   08/11/2020 11 8 - 16 mmol/L Final     eGFR if    Date Value Ref Range Status   08/11/2020 SEE COMMENT >60 mL/min/1.73 m^2 Final     eGFR if non    Date Value Ref Range Status   08/11/2020 SEE COMMENT >60 mL/min/1.73 m^2 Final     Comment:     Calculation used to obtain the estimated glomerular filtration  rate (eGFR) is the CKD-EPI equation.   Test not performed.  GFR calculation is only valid for patients   18 and older.         A/P:    Iron deficiency anemia  -due to pregnancy  -iron improving on gummies and able to tolerate, so recommend continuing this and I will recheck levels in 4 weeks  -if decreases or no longer able to tolerate, will get IV iron      Aurash Khoobehi, MD  Hematology and Oncology     115

## 2024-07-09 ENCOUNTER — TELEPHONE (OUTPATIENT)
Dept: OBSTETRICS AND GYNECOLOGY | Facility: CLINIC | Age: 19
End: 2024-07-09
Payer: MEDICAID

## 2024-07-09 DIAGNOSIS — Z34.01 ENCOUNTER FOR SUPERVISION OF NORMAL FIRST PREGNANCY IN FIRST TRIMESTER: Primary | ICD-10-CM

## 2024-07-09 NOTE — TELEPHONE ENCOUNTER
Two pt identifiers confirmed.  Spoke with Pt about [facility] coming back QNS; testing was unable due to low fetal DNA in the sample. Informed pt that she needs to come back and get redrawn. Informed pt I would get Midwife in clinic to fill out forms and put in new orders and will place it at the  for her to . Pt verbalized understanding, no further questions.

## 2024-07-10 ENCOUNTER — LAB VISIT (OUTPATIENT)
Dept: LAB | Facility: HOSPITAL | Age: 19
End: 2024-07-10
Attending: MIDWIFE
Payer: MEDICAID

## 2024-07-10 DIAGNOSIS — Z34.01 ENCOUNTER FOR SUPERVISION OF NORMAL FIRST PREGNANCY IN FIRST TRIMESTER: ICD-10-CM

## 2024-07-10 PROCEDURE — 36415 COLL VENOUS BLD VENIPUNCTURE: CPT | Performed by: MIDWIFE

## 2024-07-24 ENCOUNTER — TELEPHONE (OUTPATIENT)
Dept: OBSTETRICS AND GYNECOLOGY | Facility: CLINIC | Age: 19
End: 2024-07-24
Payer: MEDICAID

## 2024-07-24 ENCOUNTER — LAB VISIT (OUTPATIENT)
Dept: LAB | Facility: HOSPITAL | Age: 19
End: 2024-07-24
Attending: ADVANCED PRACTICE MIDWIFE
Payer: MEDICAID

## 2024-07-24 ENCOUNTER — PATIENT MESSAGE (OUTPATIENT)
Dept: OTHER | Facility: OTHER | Age: 19
End: 2024-07-24
Payer: MEDICAID

## 2024-07-24 DIAGNOSIS — O99.019 ANTEPARTUM ANEMIA: ICD-10-CM

## 2024-07-24 LAB
BASOPHILS # BLD AUTO: 0.01 K/UL (ref 0–0.2)
BASOPHILS NFR BLD: 0.1 % (ref 0–1.9)
DIFFERENTIAL METHOD BLD: ABNORMAL
EOSINOPHIL # BLD AUTO: 0.1 K/UL (ref 0–0.5)
EOSINOPHIL NFR BLD: 1.6 % (ref 0–8)
ERYTHROCYTE [DISTWIDTH] IN BLOOD BY AUTOMATED COUNT: 22 % (ref 11.5–14.5)
FERRITIN SERPL-MCNC: 13 NG/ML (ref 20–300)
HCT VFR BLD AUTO: 35.7 % (ref 37–48.5)
HGB BLD-MCNC: 10.5 G/DL (ref 12–16)
IMM GRANULOCYTES # BLD AUTO: 0.02 K/UL (ref 0–0.04)
IMM GRANULOCYTES NFR BLD AUTO: 0.3 % (ref 0–0.5)
LYMPHOCYTES # BLD AUTO: 1.6 K/UL (ref 1–4.8)
LYMPHOCYTES NFR BLD: 22.3 % (ref 18–48)
MCH RBC QN AUTO: 21.6 PG (ref 27–31)
MCHC RBC AUTO-ENTMCNC: 29.4 G/DL (ref 32–36)
MCV RBC AUTO: 73 FL (ref 82–98)
MONOCYTES # BLD AUTO: 0.4 K/UL (ref 0.3–1)
MONOCYTES NFR BLD: 6.1 % (ref 4–15)
NEUTROPHILS # BLD AUTO: 4.9 K/UL (ref 1.8–7.7)
NEUTROPHILS NFR BLD: 69.6 % (ref 38–73)
NRBC BLD-RTO: 0 /100 WBC
PLATELET # BLD AUTO: 265 K/UL (ref 150–450)
PMV BLD AUTO: ABNORMAL FL (ref 9.2–12.9)
RBC # BLD AUTO: 4.87 M/UL (ref 4–5.4)
WBC # BLD AUTO: 7.09 K/UL (ref 3.9–12.7)

## 2024-07-24 PROCEDURE — 82728 ASSAY OF FERRITIN: CPT | Performed by: INTERNAL MEDICINE

## 2024-07-24 PROCEDURE — 85025 COMPLETE CBC W/AUTO DIFF WBC: CPT | Performed by: INTERNAL MEDICINE

## 2024-07-24 PROCEDURE — 36415 COLL VENOUS BLD VENIPUNCTURE: CPT | Performed by: INTERNAL MEDICINE

## 2024-07-24 NOTE — TELEPHONE ENCOUNTER
Attempted to contact Maria De Jesus Payne 07/24/2024 at 4:28 PM regarding rescheduling an upcoming appointment with zia abbasi.   I was able to reach the patient by phone. . Appointment is cancelled.

## 2024-07-31 ENCOUNTER — PATIENT MESSAGE (OUTPATIENT)
Dept: OTHER | Facility: OTHER | Age: 19
End: 2024-07-31
Payer: MEDICAID

## 2024-08-01 ENCOUNTER — ROUTINE PRENATAL (OUTPATIENT)
Dept: OBSTETRICS AND GYNECOLOGY | Facility: CLINIC | Age: 19
End: 2024-08-01
Payer: MEDICAID

## 2024-08-01 VITALS
DIASTOLIC BLOOD PRESSURE: 68 MMHG | BODY MASS INDEX: 27.86 KG/M2 | SYSTOLIC BLOOD PRESSURE: 122 MMHG | WEIGHT: 172.63 LBS

## 2024-08-01 DIAGNOSIS — Z36.89 ENCOUNTER FOR FETAL ANATOMIC SURVEY: Primary | ICD-10-CM

## 2024-08-01 DIAGNOSIS — O99.012 ANEMIA IN PREGNANCY, SECOND TRIMESTER: ICD-10-CM

## 2024-08-01 DIAGNOSIS — Z34.01 ENCOUNTER FOR SUPERVISION OF NORMAL FIRST PREGNANCY IN FIRST TRIMESTER: ICD-10-CM

## 2024-08-01 PROCEDURE — 99212 OFFICE O/P EST SF 10 MIN: CPT | Mod: PBBFAC,TH | Performed by: ADVANCED PRACTICE MIDWIFE

## 2024-08-01 PROCEDURE — 99999 PR PBB SHADOW E&M-EST. PATIENT-LVL II: CPT | Mod: PBBFAC,,, | Performed by: ADVANCED PRACTICE MIDWIFE

## 2024-08-01 NOTE — PROGRESS NOTES
19 y.o. female  at 17w1d   Not yet feeling flutters, denies VB, LOF or cramping  Doing well without concerns     TW lbs discussed recommended weight gain 25-25 lbs  Genetic testing results pending from [facility]21  Anatomy scan ordered    Encounter for fetal anatomic survey  -     US OB/GYN Procedure ([facility])-Future; Future    Anemia in pregnancy, second trimester  Followed by hematology-appt   Liquid iron    Encounter for supervision of normal first pregnancy in first trimester    Reviewed warning signs, pregnancy precautions and how/when to call.  RTC x 4 wks, call or present sooner prn.

## 2024-08-15 ENCOUNTER — TELEPHONE (OUTPATIENT)
Dept: HEMATOLOGY/ONCOLOGY | Facility: CLINIC | Age: 19
End: 2024-08-15
Payer: MEDICAID

## 2024-08-19 ENCOUNTER — ROUTINE PRENATAL (OUTPATIENT)
Dept: OBSTETRICS AND GYNECOLOGY | Facility: CLINIC | Age: 19
End: 2024-08-19
Payer: MEDICAID

## 2024-08-19 ENCOUNTER — PROCEDURE VISIT (OUTPATIENT)
Dept: OBSTETRICS AND GYNECOLOGY | Facility: CLINIC | Age: 19
End: 2024-08-19
Payer: MEDICAID

## 2024-08-19 VITALS — SYSTOLIC BLOOD PRESSURE: 120 MMHG | BODY MASS INDEX: 28.5 KG/M2 | DIASTOLIC BLOOD PRESSURE: 70 MMHG | WEIGHT: 176.56 LBS

## 2024-08-19 DIAGNOSIS — O99.019 ANTEPARTUM ANEMIA: ICD-10-CM

## 2024-08-19 DIAGNOSIS — Z36.2 ENCOUNTER FOR FOLLOW-UP ULTRASOUND OF FETAL ANATOMY: ICD-10-CM

## 2024-08-19 DIAGNOSIS — Z36.89 ENCOUNTER FOR FETAL ANATOMIC SURVEY: ICD-10-CM

## 2024-08-19 DIAGNOSIS — Z34.02 ENCOUNTER FOR SUPERVISION OF NORMAL FIRST PREGNANCY IN SECOND TRIMESTER: Primary | ICD-10-CM

## 2024-08-19 PROBLEM — Z34.92 ENCOUNTER FOR SUPERVISION OF NORMAL PREGNANCY IN SECOND TRIMESTER: Status: ACTIVE | Noted: 2024-05-28

## 2024-08-19 PROCEDURE — 99999 PR PBB SHADOW E&M-EST. PATIENT-LVL II: CPT | Mod: PBBFAC,,, | Performed by: ADVANCED PRACTICE MIDWIFE

## 2024-08-19 PROCEDURE — 99214 OFFICE O/P EST MOD 30 MIN: CPT | Mod: TH,S$PBB,UC, | Performed by: ADVANCED PRACTICE MIDWIFE

## 2024-08-19 PROCEDURE — 76805 OB US >/= 14 WKS SNGL FETUS: CPT | Mod: PBBFAC | Performed by: OBSTETRICS & GYNECOLOGY

## 2024-08-19 PROCEDURE — 99212 OFFICE O/P EST SF 10 MIN: CPT | Mod: PBBFAC,TH | Performed by: ADVANCED PRACTICE MIDWIFE

## 2024-08-19 NOTE — PROGRESS NOTES
19 y.o. female  at 19w5d   She is not feeling flutters/FM, denies VB, LOF or cramping  Doing well without concerns. Feeling very good overall, often forgets that she is pregnant.   TW lbs     Reviewed anatomy US-normal fetal anatomy with no obvious abnormalities. Suboptimal views of heart and spine. S=D. Normal amniotic fluid, normal placental location in anterior position, no evidence of previa. Normal appearing cervical length at 35.5. Surprise gender. 3VC. EFW 10 oz. Will await [facility] recommendations.    Genetic testing not enough fetal cells.     Encounter for supervision of normal first pregnancy in second trimester  - routine PNC  -repeat US nv.        Reviewed warning signs, normal FM,  labor precautions and how/when to call.  RTC x 4 wks, call or present sooner prn.

## 2024-08-21 ENCOUNTER — PATIENT MESSAGE (OUTPATIENT)
Dept: OTHER | Facility: OTHER | Age: 19
End: 2024-08-21
Payer: MEDICAID

## 2024-08-21 ENCOUNTER — TELEPHONE (OUTPATIENT)
Dept: HEMATOLOGY/ONCOLOGY | Facility: CLINIC | Age: 19
End: 2024-08-21
Payer: MEDICAID

## 2024-08-26 ENCOUNTER — TELEPHONE (OUTPATIENT)
Dept: HEMATOLOGY/ONCOLOGY | Facility: CLINIC | Age: 19
End: 2024-08-26
Payer: MEDICAID

## 2024-08-28 DIAGNOSIS — O99.019 ANTEPARTUM ANEMIA: Primary | ICD-10-CM

## 2024-08-29 ENCOUNTER — TELEPHONE (OUTPATIENT)
Dept: HEMATOLOGY/ONCOLOGY | Facility: CLINIC | Age: 19
End: 2024-08-29
Payer: MEDICAID

## 2024-08-29 ENCOUNTER — PATIENT MESSAGE (OUTPATIENT)
Dept: OBSTETRICS AND GYNECOLOGY | Facility: CLINIC | Age: 19
End: 2024-08-29
Payer: MEDICAID

## 2024-08-29 NOTE — TELEPHONE ENCOUNTER
Lvm that labs have not been drawn, and appt needs to be rescheduled.  This will be pts  fourth missed appt.  Missed appt letter will be sent to pt address on file

## 2024-08-29 NOTE — TELEPHONE ENCOUNTER
Spoke to pt.  Pt scheduled for a vv on 9/1/24  Labs are  to be drawn 9/11    Pt verbally agreed to this appt                 ----- Message from Kalpana Clemente sent at 8/29/2024 10:27 AM CDT -----  Contact: pt [phone]    ----- Message -----  From: Lori De La Cruz  Sent: 8/29/2024  10:00 AM CDT  To: [facility] Hem/Onc Clerical Staff Pool    Type:  Sooner Appointment Request    Caller is requesting a sooner appointment.  Caller declined first available appointment listed below.  Caller will not accept being placed on the waitlist and is requesting a message be sent to doctor.    Name of Caller:  Pt   When is the first available appointment?  Sep 25  Symptoms:  f/u   Would the patient rather a call back or a response via [employer]? Call   Best Call Back Number:  [phone]]    Additional Information:  pt req sooner appt/ asking if she can see a different provider if avail sooner

## 2024-08-29 NOTE — TELEPHONE ENCOUNTER
Please see previous note           ----- Message from Nicolle Johnson sent at 8/29/2024  9:35 AM CDT -----  Trixie pt returning your call     [phone]

## 2024-09-10 ENCOUNTER — LAB VISIT (OUTPATIENT)
Dept: LAB | Facility: HOSPITAL | Age: 19
End: 2024-09-10
Attending: INTERNAL MEDICINE
Payer: MEDICAID

## 2024-09-10 DIAGNOSIS — O99.019 ANTEPARTUM ANEMIA: ICD-10-CM

## 2024-09-10 LAB
ANISOCYTOSIS BLD QL SMEAR: SLIGHT
BASOPHILS # BLD AUTO: 0.01 K/UL (ref 0–0.2)
BASOPHILS NFR BLD: 0.1 % (ref 0–1.9)
DIFFERENTIAL METHOD BLD: ABNORMAL
EOSINOPHIL # BLD AUTO: 0.1 K/UL (ref 0–0.5)
EOSINOPHIL NFR BLD: 1.4 % (ref 0–8)
ERYTHROCYTE [DISTWIDTH] IN BLOOD BY AUTOMATED COUNT: 22.4 % (ref 11.5–14.5)
HCT VFR BLD AUTO: 35.2 % (ref 37–48.5)
HGB BLD-MCNC: 11 G/DL (ref 12–16)
IMM GRANULOCYTES # BLD AUTO: 0.03 K/UL (ref 0–0.04)
IMM GRANULOCYTES NFR BLD AUTO: 0.4 % (ref 0–0.5)
LYMPHOCYTES # BLD AUTO: 1.7 K/UL (ref 1–4.8)
LYMPHOCYTES NFR BLD: 22.4 % (ref 18–48)
MCH RBC QN AUTO: 23.9 PG (ref 27–31)
MCHC RBC AUTO-ENTMCNC: 31.3 G/DL (ref 32–36)
MCV RBC AUTO: 77 FL (ref 82–98)
MONOCYTES # BLD AUTO: 0.5 K/UL (ref 0.3–1)
MONOCYTES NFR BLD: 6.9 % (ref 4–15)
NEUTROPHILS # BLD AUTO: 5.3 K/UL (ref 1.8–7.7)
NEUTROPHILS NFR BLD: 68.8 % (ref 38–73)
NRBC BLD-RTO: 0 /100 WBC
PLATELET # BLD AUTO: 246 K/UL (ref 150–450)
PLATELET BLD QL SMEAR: ABNORMAL
PMV BLD AUTO: 9.5 FL (ref 9.2–12.9)
POIKILOCYTOSIS BLD QL SMEAR: SLIGHT
POLYCHROMASIA BLD QL SMEAR: ABNORMAL
RBC # BLD AUTO: 4.6 M/UL (ref 4–5.4)
WBC # BLD AUTO: 7.71 K/UL (ref 3.9–12.7)

## 2024-09-10 PROCEDURE — 36415 COLL VENOUS BLD VENIPUNCTURE: CPT | Performed by: INTERNAL MEDICINE

## 2024-09-10 PROCEDURE — 82728 ASSAY OF FERRITIN: CPT | Performed by: INTERNAL MEDICINE

## 2024-09-11 LAB — FERRITIN SERPL-MCNC: 21 NG/ML (ref 20–300)

## 2024-09-12 ENCOUNTER — OFFICE VISIT (OUTPATIENT)
Dept: HEMATOLOGY/ONCOLOGY | Facility: CLINIC | Age: 19
End: 2024-09-12
Payer: MEDICAID

## 2024-09-12 DIAGNOSIS — O99.019 ANTEPARTUM ANEMIA: Primary | ICD-10-CM

## 2024-09-12 NOTE — PROGRESS NOTES
The patient location is: home  Visit type: Virtual visit with synchronous audio and video  Face-to-face or time spent with patient on the encounter: 25 min  Total time spent on and for  this encounter which includes non face-to-face time preparing to see patient, review of tests, obtaining and or reviewing separately obtained records documenting clinical information in the electronic or other health records, independently interpreting results which is not separately reported ,and communicating results to the patient/family/caregiver and in care coordination and treatment planning/communicating with pharmacy for prescriptions/addressing social needs/arranging follow-up and or referrals : 25 min     Each patient I provide medical services by telemedicine is:  (1) informed of the relationship between the physician and patient and the respective role of any other health care provider with respect to management of the patient; and (2) notified that he or she may decline to receive medical services by telemedicine and may withdraw from such care at any time.  This is a video visit therefore some elements of the physical exam such as vital signs, heart sounds are breath sounds are not included and may be included if found in recent clinic notes of other providers assessing same patient. Any symptoms or signs that were visualized were stated by the patient may be included in this note.      Service Date:  9/12/24    Chief Complaint: iron deficiency anemia    Maria De Jesus Payne is a 19 y.o. female here with iron deficiency anemia secondary to pregnancy.  Patient has been taking oral iron gummies as it is the only type of oral iron she is able to hold down.  Her iron levels are improving on this.  She is doing well.  She is due in January.    Review of Systems   Constitutional: Negative.  Negative for appetite change and unexpected weight change.   HENT: Negative.  Negative for mouth sores.    Eyes: Negative.  Negative for  visual disturbance.   Respiratory: Negative.  Negative for cough and shortness of breath.    Cardiovascular: Negative.  Negative for chest pain.   Gastrointestinal: Negative.  Negative for abdominal pain and diarrhea.   Endocrine: Negative.    Genitourinary:  Positive for frequency.   Musculoskeletal: Negative.  Negative for back pain.   Integumentary:  Negative for rash. Negative.   Neurological: Negative.  Negative for headaches.   Hematological: Negative.  Negative for adenopathy.   Psychiatric/Behavioral: Negative.  The patient is not nervous/anxious.         Current Outpatient Medications   Medication Instructions    ferrous sulfate (IRON) 325 mg, Oral, 3 times daily    ferrous sulfate 300 mg, Oral, Daily        Past Medical History:   Diagnosis Date    ASCUS of cervix with negative high risk HPV 04/15/2024    Repeat pap in 6 months, 10/22/24    Irregular menstrual cycle     Labial cyst 2022    lanced        No past surgical history on file.     Family History   Problem Relation Name Age of Onset    Diabetes Father      No Known Problems Mother         Social History     Tobacco Use    Smoking status: Never    Smokeless tobacco: Never   Substance Use Topics    Alcohol use: Never    Drug use: Not Currently         There were no vitals filed for this visit.     Physical Exam:  LMP 04/04/2024     Physical Exam  Constitutional:       Appearance: Normal appearance.   HENT:      Head: Normocephalic and atraumatic.      Nose: Nose normal.      Mouth/Throat:      Mouth: Mucous membranes are moist.      Pharynx: Oropharynx is clear.   Eyes:      Conjunctiva/sclera: Conjunctivae normal.   Cardiovascular:      Rate and Rhythm: Normal rate and regular rhythm.      Heart sounds: Normal heart sounds.   Pulmonary:      Effort: Pulmonary effort is normal.      Breath sounds: Normal breath sounds.   Abdominal:      General: Abdomen is flat. Bowel sounds are normal.      Palpations: Abdomen is soft.   Musculoskeletal:          General: Normal range of motion.      Cervical back: Normal range of motion and neck supple.   Skin:     General: Skin is warm and dry.   Neurological:      General: No focal deficit present.      Mental Status: She is alert and oriented to person, place, and time. Mental status is at baseline.   Psychiatric:         Mood and Affect: Mood normal.          Labs:  Lab Results   Component Value Date    WBC 7.71 09/10/2024    RBC 4.60 09/10/2024    HGB 11.0 (L) 09/10/2024    HCT 35.2 (L) 09/10/2024    MCV 77 (L) 09/10/2024    MCH 23.9 (L) 09/10/2024    MCHC 31.3 (L) 09/10/2024    RDW 22.4 (H) 09/10/2024     09/10/2024    MPV 9.5 09/10/2024    GRAN 5.3 09/10/2024    GRAN 68.8 09/10/2024    LYMPH 1.7 09/10/2024    LYMPH 22.4 09/10/2024    MONO 0.5 09/10/2024    MONO 6.9 09/10/2024    EOS 0.1 09/10/2024    BASO 0.01 09/10/2024    EOSINOPHIL 1.4 09/10/2024    BASOPHIL 0.1 09/10/2024     Sodium   Date Value Ref Range Status   08/11/2020 142 136 - 145 mmol/L Final     Potassium   Date Value Ref Range Status   08/11/2020 4.1 3.5 - 5.1 mmol/L Final     Chloride   Date Value Ref Range Status   08/11/2020 106 95 - 110 mmol/L Final     CO2   Date Value Ref Range Status   08/11/2020 25 23 - 29 mmol/L Final     Glucose   Date Value Ref Range Status   08/11/2020 95 70 - 110 mg/dL Final     BUN   Date Value Ref Range Status   08/11/2020 6 5 - 18 mg/dL Final     Creatinine   Date Value Ref Range Status   08/11/2020 0.8 0.5 - 1.4 mg/dL Final     Calcium   Date Value Ref Range Status   08/11/2020 9.8 8.7 - 10.5 mg/dL Final     Total Protein   Date Value Ref Range Status   08/11/2020 8.1 6.0 - 8.4 g/dL Final     Albumin   Date Value Ref Range Status   08/11/2020 3.9 3.2 - 4.7 g/dL Final     Total Bilirubin   Date Value Ref Range Status   08/11/2020 0.2 0.1 - 1.0 mg/dL Final     Comment:     For infants and newborns, interpretation of results should be based  on gestational age, weight and in agreement with  clinical  observations.  Premature Infant recommended reference ranges:  Up to 24 hours.............<8.0 mg/dL  Up to 48 hours............<12.0 mg/dL  3-5 days..................<15.0 mg/dL  6-29 days.................<15.0 mg/dL       Alkaline Phosphatase   Date Value Ref Range Status   08/11/2020 107 (H) 48 - 95 U/L Final     AST   Date Value Ref Range Status   08/11/2020 19 10 - 40 U/L Final     ALT   Date Value Ref Range Status   08/11/2020 21 10 - 44 U/L Final     Anion Gap   Date Value Ref Range Status   08/11/2020 11 8 - 16 mmol/L Final     eGFR if    Date Value Ref Range Status   08/11/2020 SEE COMMENT >60 mL/min/1.73 m^2 Final     eGFR if non    Date Value Ref Range Status   08/11/2020 SEE COMMENT >60 mL/min/1.73 m^2 Final     Comment:     Calculation used to obtain the estimated glomerular filtration  rate (eGFR) is the CKD-EPI equation.   Test not performed.  GFR calculation is only valid for patients   18 and older.         A/P:    Iron deficiency anemia  -due to pregnancy  -improving on gummies.  Will recheck blood work in 8 weeks.      Aurash Khoobehi, MD  Hematology and Oncology

## 2024-09-16 ENCOUNTER — ROUTINE PRENATAL (OUTPATIENT)
Dept: OBSTETRICS AND GYNECOLOGY | Facility: CLINIC | Age: 19
End: 2024-09-16
Payer: MEDICAID

## 2024-09-16 ENCOUNTER — PROCEDURE VISIT (OUTPATIENT)
Dept: OBSTETRICS AND GYNECOLOGY | Facility: CLINIC | Age: 19
End: 2024-09-16
Payer: MEDICAID

## 2024-09-16 VITALS
SYSTOLIC BLOOD PRESSURE: 129 MMHG | WEIGHT: 180.56 LBS | BODY MASS INDEX: 29.14 KG/M2 | DIASTOLIC BLOOD PRESSURE: 88 MMHG

## 2024-09-16 DIAGNOSIS — Z36.89 ENCOUNTER FOR FETAL ANATOMIC SURVEY: ICD-10-CM

## 2024-09-16 DIAGNOSIS — Z34.02 ENCOUNTER FOR SUPERVISION OF NORMAL FIRST PREGNANCY IN SECOND TRIMESTER: Primary | ICD-10-CM

## 2024-09-16 PROCEDURE — 99214 OFFICE O/P EST MOD 30 MIN: CPT | Mod: TH,S$PBB,UC, | Performed by: ADVANCED PRACTICE MIDWIFE

## 2024-09-16 PROCEDURE — 76816 OB US FOLLOW-UP PER FETUS: CPT | Mod: PBBFAC | Performed by: STUDENT IN AN ORGANIZED HEALTH CARE EDUCATION/TRAINING PROGRAM

## 2024-09-16 PROCEDURE — 99999 PR PBB SHADOW E&M-EST. PATIENT-LVL II: CPT | Mod: PBBFAC,,, | Performed by: ADVANCED PRACTICE MIDWIFE

## 2024-09-16 PROCEDURE — 99212 OFFICE O/P EST SF 10 MIN: CPT | Mod: PBBFAC,TH | Performed by: ADVANCED PRACTICE MIDWIFE

## 2024-09-16 NOTE — PROGRESS NOTES
19 y.o. female  at 23w5d   Reports + FM, denies VB, LOF, or cramping  Doing well without concerns. Reviewed last hemonc note, went really well overall.       TW lbs   Discussed feeding options: would like breast  Encouraged patient to attend [facility]s Prenatal Breastfeeding Class.    Reviewed upcoming 28wk labs, (O POS) and orders placed  Tdap handout provided and explained    US today shows anatomy complete, EFW 1#5oz, placenta anterior, cephalic, 3VC, and MVP 4.3    Would like delayed cord clamping, and to take placenta home. Discussed that it needs to be taken within 2 hours of birth.    Encounter for supervision of normal first pregnancy in second trimester  -     CBC Without Differential; Future; Expected date: 2024  -     HIV 1/2 Ag/Ab (4th Gen); Future; Expected date: 2024  -     OB Glucose Screen; Future; Expected date: 2024  -     Treponema Pallidium Antibodies IgG, IgM; Future; Expected date: 2024         Reviewed warning signs, normal FM,  labor precautions and how/when to call.  RTC x 4 wks, call or present sooner prn.

## 2024-09-16 NOTE — PATIENT INSTRUCTIONS
"  Frequently Asked Questions for Pregnant Women Concerning Tdap Vaccination    What is pertussis (whooping cough)?  Pertussis (also called whooping cough) is a highly contagious disease that causes severe coughing. People with pertussis may make a "whooping" sound when they try to breathe and gasp for air. In newborns (birth to 1 month), pertussis can be life threatening. Recent outbreaks have shown that infants younger than 3 months are at very high risk of severe infection.     What is Tdap?  The tetanus toxoid, reduced diphtheria toxoid, and acellular pertussis (Tdap) vaccine is used to prevent three infections: 1) tetanus, 2) diptheria, and 3) pertussis.    I am pregnant. Should I get a Tdap shot?  Yes. All pregnant women should get a Tdap shot in the 3rd trimester, preferably between 27 weeks and 36 weeks of pregnancy. The Tdap shot is an effective and safe way to protect you and your baby from serious illness and complications of pertussis. You should get a Tdap shot during each pregnancy.    Is it safe to get the Tdap shot during pregnancy?  Yes. There are no theoretical or proven concerns about the safety of the Tdap vaccine (or other inactivated vaccines like Tdap) during pregnancy. The shot is safe when given to pregnant women.     During which trimester is it safe to get a Tdap shot?  It is safe to get the Tdap shot during all three trimesters of pregnancy. Experts recommend that you get Tdap during the 3rd trimester (preferably between 27 weeks and 36 weeks of pregnancy). This gives your  the most protection. The shot causes you to make antibodies against pertussis. These antibiotics are passed to the fetus. They protect your  until he or she begins to get vaccines against pertussis at 2 months of age.    Can newborns be vaccinated against pertussis?  No. Newborns cannot start their vaccine series against pertussis until they are 2 months of age because the vaccine does not work in the " "first few weeks of life. This is partly why newborns are at a higher risk of getting pertussis and becoming very ill.    What else can I do to protect my baby against pertussis?  Getting your Tdap shot is the most important step in protecting yourself and your baby against pertussis. It also is important that all family members and caregivers are up-to-date with their vaccines. If they need the Tdap shot, they should get it at least 2 weeks before having contact with your . This makes a safety "cocoon" of vaccinated caregivers around your baby.    I am breastfeeding my baby. Is it safe to get the Tdap vaccine?  Yes. The Tdap shot can safely be given to breastfeeding women if they did not get the Tdap shot during pregnancy and have never received the Tdap shot before.    I did not get my Tdap shot during pregnancy. Do I still need to get the vaccine?  If you have never gotten the Tdap vaccine and you do not get the shot during pregnancy, be sure to get the vaccine right after you give birth, before you leave the hospital or birthing center. It will take about 2 weeks for your body to make protective antibodies in response to the vaccine. Once these antibodies are made, you are likely to give pertussis to your . But remember, your baby still will be at risk of catching whooping cough from others.    I got a Tdap shot during a past pregnancy. Do I need to get the shot again during this pregnancy?  Yes. All pregnant women should get a Tdap shot during each pregnancy, preferably between 27 weeks and 36 weeks of pregnancy. This time frame is recommended because it gives the most protection to the pregnant woman and the fetus. It appears to maximize the antibodies present in the  at birth.    I received a Tdap shot early in this pregnancy, before 27-36 weeks of pregnancy. Do I need to get another Tdap shot between 27 weeks and 36 weeks of pregnancy?  A pregnant woman does not need to get the Tdap shot " later in the same pregnancy if she got the shot in the first or second trimester.     Can I get the Tdap vaccine and flu vaccine at the same time?  Yes. You can get more than one vaccine in the same visit.    What is the difference between Tdap, Td, and DTaP?  Children receive the diphtheria and tetanus toxoids and acellular pertussis (DTaP) vaccine. Teenagers and adults are given the Tdap vaccine as a booster to the DTaP they got as children. Adults receive the tetanus and diphtheria (Td) vaccine every 10 years to protect against tetanus and diphtheria. Td does not protect against pertussis.     RESOURCES  www.[employer].org  www.immunizationforwomen.org  https://www.cdc.gov/vaccines/pregnancy/index.html  [email or web address]

## 2024-09-18 ENCOUNTER — PATIENT MESSAGE (OUTPATIENT)
Dept: OTHER | Facility: OTHER | Age: 19
End: 2024-09-18
Payer: MEDICAID

## 2024-10-02 ENCOUNTER — PATIENT MESSAGE (OUTPATIENT)
Dept: OTHER | Facility: OTHER | Age: 19
End: 2024-10-02
Payer: MEDICAID

## 2024-10-14 ENCOUNTER — LAB VISIT (OUTPATIENT)
Dept: LAB | Facility: HOSPITAL | Age: 19
End: 2024-10-14
Attending: ADVANCED PRACTICE MIDWIFE
Payer: MEDICAID

## 2024-10-14 ENCOUNTER — ROUTINE PRENATAL (OUTPATIENT)
Dept: OBSTETRICS AND GYNECOLOGY | Facility: CLINIC | Age: 19
End: 2024-10-14
Payer: MEDICAID

## 2024-10-14 VITALS
WEIGHT: 186.75 LBS | DIASTOLIC BLOOD PRESSURE: 80 MMHG | BODY MASS INDEX: 30.14 KG/M2 | SYSTOLIC BLOOD PRESSURE: 120 MMHG

## 2024-10-14 DIAGNOSIS — Z34.02 ENCOUNTER FOR SUPERVISION OF NORMAL FIRST PREGNANCY IN SECOND TRIMESTER: ICD-10-CM

## 2024-10-14 DIAGNOSIS — O99.019 ANTEPARTUM ANEMIA: ICD-10-CM

## 2024-10-14 DIAGNOSIS — Z34.02 ENCOUNTER FOR SUPERVISION OF NORMAL FIRST PREGNANCY IN SECOND TRIMESTER: Primary | ICD-10-CM

## 2024-10-14 LAB
ERYTHROCYTE [DISTWIDTH] IN BLOOD BY AUTOMATED COUNT: 23 % (ref 11.5–14.5)
GLUCOSE SERPL-MCNC: 142 MG/DL (ref 70–140)
HCT VFR BLD AUTO: 38 % (ref 37–48.5)
HGB BLD-MCNC: 11.8 G/DL (ref 12–16)
HIV 1+2 AB+HIV1 P24 AG SERPL QL IA: NORMAL
MCH RBC QN AUTO: 25.9 PG (ref 27–31)
MCHC RBC AUTO-ENTMCNC: 31.1 G/DL (ref 32–36)
MCV RBC AUTO: 83 FL (ref 82–98)
PLATELET # BLD AUTO: 199 K/UL (ref 150–450)
PMV BLD AUTO: 11.6 FL (ref 9.2–12.9)
RBC # BLD AUTO: 4.56 M/UL (ref 4–5.4)
TREPONEMA PALLIDUM IGG+IGM AB [PRESENCE] IN SERUM OR PLASMA BY IMMUNOASSAY: NONREACTIVE
WBC # BLD AUTO: 6.52 K/UL (ref 3.9–12.7)

## 2024-10-14 PROCEDURE — 99214 OFFICE O/P EST MOD 30 MIN: CPT | Mod: TH,S$PBB,UC, | Performed by: ADVANCED PRACTICE MIDWIFE

## 2024-10-14 PROCEDURE — 99213 OFFICE O/P EST LOW 20 MIN: CPT | Mod: PBBFAC,TH | Performed by: ADVANCED PRACTICE MIDWIFE

## 2024-10-14 PROCEDURE — 86593 SYPHILIS TEST NON-TREP QUANT: CPT | Performed by: ADVANCED PRACTICE MIDWIFE

## 2024-10-14 PROCEDURE — 87389 HIV-1 AG W/HIV-1&-2 AB AG IA: CPT | Performed by: ADVANCED PRACTICE MIDWIFE

## 2024-10-14 PROCEDURE — 36415 COLL VENOUS BLD VENIPUNCTURE: CPT | Performed by: ADVANCED PRACTICE MIDWIFE

## 2024-10-14 PROCEDURE — 99999 PR PBB SHADOW E&M-EST. PATIENT-LVL III: CPT | Mod: PBBFAC,,, | Performed by: ADVANCED PRACTICE MIDWIFE

## 2024-10-14 PROCEDURE — 85027 COMPLETE CBC AUTOMATED: CPT | Performed by: ADVANCED PRACTICE MIDWIFE

## 2024-10-14 PROCEDURE — 82950 GLUCOSE TEST: CPT | Performed by: ADVANCED PRACTICE MIDWIFE

## 2024-10-14 NOTE — PROGRESS NOTES
19 y.o. female  at 27w5d   Reports + FM, denies VB, LOF or CTX  Doing well without concerns.    TW lbs   28wk labs today (O POS)  Rhogam does not need  Tdap will consider along with flu.     Birth control options discussed. Does not want depo, as she had bad experience in the past. Discussed other options. Info sent through [email or web address].   Will start visits every 2 weeks    1. Encounter for supervision of normal first pregnancy in second trimester    2. Antepartum anemia  Overview:  Seeing hemonc. Levels improving with oral iron.            Reviewed warning signs, normal FKCs,  labor precautions and how/when to call.  RTC x 2 wks, call or present sooner prn.

## 2024-10-14 NOTE — PATIENT INSTRUCTIONS
"  Frequently Asked Questions for Pregnant Women Concerning Tdap Vaccination    What is pertussis (whooping cough)?  Pertussis (also called whooping cough) is a highly contagious disease that causes severe coughing. People with pertussis may make a "whooping" sound when they try to breathe and gasp for air. In newborns (birth to 1 month), pertussis can be life threatening. Recent outbreaks have shown that infants younger than 3 months are at very high risk of severe infection.     What is Tdap?  The tetanus toxoid, reduced diphtheria toxoid, and acellular pertussis (Tdap) vaccine is used to prevent three infections: 1) tetanus, 2) diptheria, and 3) pertussis.    I am pregnant. Should I get a Tdap shot?  Yes. All pregnant women should get a Tdap shot in the 3rd trimester, preferably between 27 weeks and 36 weeks of pregnancy. The Tdap shot is an effective and safe way to protect you and your baby from serious illness and complications of pertussis. You should get a Tdap shot during each pregnancy.    Is it safe to get the Tdap shot during pregnancy?  Yes. There are no theoretical or proven concerns about the safety of the Tdap vaccine (or other inactivated vaccines like Tdap) during pregnancy. The shot is safe when given to pregnant women.     During which trimester is it safe to get a Tdap shot?  It is safe to get the Tdap shot during all three trimesters of pregnancy. Experts recommend that you get Tdap during the 3rd trimester (preferably between 27 weeks and 36 weeks of pregnancy). This gives your  the most protection. The shot causes you to make antibodies against pertussis. These antibiotics are passed to the fetus. They protect your  until he or she begins to get vaccines against pertussis at 2 months of age.    Can newborns be vaccinated against pertussis?  No. Newborns cannot start their vaccine series against pertussis until they are 2 months of age because the vaccine does not work in the " "first few weeks of life. This is partly why newborns are at a higher risk of getting pertussis and becoming very ill.    What else can I do to protect my baby against pertussis?  Getting your Tdap shot is the most important step in protecting yourself and your baby against pertussis. It also is important that all family members and caregivers are up-to-date with their vaccines. If they need the Tdap shot, they should get it at least 2 weeks before having contact with your . This makes a safety "cocoon" of vaccinated caregivers around your baby.    I am breastfeeding my baby. Is it safe to get the Tdap vaccine?  Yes. The Tdap shot can safely be given to breastfeeding women if they did not get the Tdap shot during pregnancy and have never received the Tdap shot before.    I did not get my Tdap shot during pregnancy. Do I still need to get the vaccine?  If you have never gotten the Tdap vaccine and you do not get the shot during pregnancy, be sure to get the vaccine right after you give birth, before you leave the hospital or birthing center. It will take about 2 weeks for your body to make protective antibodies in response to the vaccine. Once these antibodies are made, you are likely to give pertussis to your . But remember, your baby still will be at risk of catching whooping cough from others.    I got a Tdap shot during a past pregnancy. Do I need to get the shot again during this pregnancy?  Yes. All pregnant women should get a Tdap shot during each pregnancy, preferably between 27 weeks and 36 weeks of pregnancy. This time frame is recommended because it gives the most protection to the pregnant woman and the fetus. It appears to maximize the antibodies present in the  at birth.    I received a Tdap shot early in this pregnancy, before 27-36 weeks of pregnancy. Do I need to get another Tdap shot between 27 weeks and 36 weeks of pregnancy?  A pregnant woman does not need to get the Tdap shot " later in the same pregnancy if she got the shot in the first or second trimester.     Can I get the Tdap vaccine and flu vaccine at the same time?  Yes. You can get more than one vaccine in the same visit.    What is the difference between Tdap, Td, and DTaP?  Children receive the diphtheria and tetanus toxoids and acellular pertussis (DTaP) vaccine. Teenagers and adults are given the Tdap vaccine as a booster to the DTaP they got as children. Adults receive the tetanus and diphtheria (Td) vaccine every 10 years to protect against tetanus and diphtheria. Td does not protect against pertussis.     RESOURCES  www.acog.org  www.immunizationforwomen.org  https://www.cdc.gov/vaccines/pregnancy/index.html  [email or web address].org

## 2024-10-16 ENCOUNTER — TELEPHONE (OUTPATIENT)
Dept: OBSTETRICS AND GYNECOLOGY | Facility: CLINIC | Age: 19
End: 2024-10-16
Payer: MEDICAID

## 2024-10-16 ENCOUNTER — PATIENT MESSAGE (OUTPATIENT)
Dept: OTHER | Facility: OTHER | Age: 19
End: 2024-10-16
Payer: MEDICAID

## 2024-10-16 DIAGNOSIS — O99.810 ABNORMAL GLUCOSE AFFECTING PREGNANCY: Primary | ICD-10-CM

## 2024-10-16 NOTE — TELEPHONE ENCOUNTER
----- Message from Angy Rushing sent at 10/16/2024  8:35 AM CDT -----  Can yall call this patient and let her know that she failed her glucose test. She now has to do the 3 hour test and this should be done completely fasting. I put the order in if russell can get her scheduled. :)

## 2024-10-16 NOTE — TELEPHONE ENCOUNTER
Contacted Maria De Jesus Payne regarding results, verified 2 patient identifiers.    Maria De Jesus Payne has been notified 10/16/2024 at 9:46 AM & verbalized understanding of results and recommendations.     Patient's appointment has been scheduled. Maria De Jesus Payne has been notified 10/16/2024 at 9:46 AM & verbalized understanding.

## 2024-10-17 ENCOUNTER — HOSPITAL ENCOUNTER (OUTPATIENT)
Facility: HOSPITAL | Age: 19
Discharge: HOME | End: 2024-10-17
Attending: OBSTETRICS & GYNECOLOGY | Admitting: OBSTETRICS & GYNECOLOGY
Payer: MEDICAID

## 2024-10-17 VITALS — DIASTOLIC BLOOD PRESSURE: 90 MMHG | HEART RATE: 84 BPM | SYSTOLIC BLOOD PRESSURE: 135 MMHG

## 2024-10-17 DIAGNOSIS — O13.3 GESTATIONAL HYPERTENSION, THIRD TRIMESTER: Primary | ICD-10-CM

## 2024-10-17 DIAGNOSIS — R42 DIZZINESS: ICD-10-CM

## 2024-10-17 PROBLEM — R03.0 ELEVATED BLOOD PRESSURE READING: Status: ACTIVE | Noted: 2024-10-17

## 2024-10-17 LAB
ALBUMIN SERPL BCP-MCNC: 2.9 G/DL (ref 3.5–5.2)
ALP SERPL-CCNC: 289 U/L (ref 40–150)
ALT SERPL W/O P-5'-P-CCNC: 11 U/L (ref 10–44)
ANION GAP SERPL CALC-SCNC: 9 MMOL/L (ref 8–16)
AST SERPL-CCNC: 16 U/L (ref 10–40)
BASOPHILS # BLD AUTO: 0.02 K/UL (ref 0–0.2)
BASOPHILS NFR BLD: 0.3 % (ref 0–1.9)
BILIRUB SERPL-MCNC: 0.2 MG/DL (ref 0.1–1)
BUN SERPL-MCNC: 6 MG/DL (ref 6–20)
CALCIUM SERPL-MCNC: 9.5 MG/DL (ref 8.7–10.5)
CHLORIDE SERPL-SCNC: 111 MMOL/L (ref 95–110)
CO2 SERPL-SCNC: 19 MMOL/L (ref 23–29)
CREAT SERPL-MCNC: 0.6 MG/DL (ref 0.5–1.4)
CREAT UR-MCNC: 14.2 MG/DL (ref 15–325)
DIFFERENTIAL METHOD BLD: ABNORMAL
EOSINOPHIL # BLD AUTO: 0.1 K/UL (ref 0–0.5)
EOSINOPHIL NFR BLD: 0.9 % (ref 0–8)
ERYTHROCYTE [DISTWIDTH] IN BLOOD BY AUTOMATED COUNT: 22.3 % (ref 11.5–14.5)
EST. GFR  (NO RACE VARIABLE): >60 ML/MIN/1.73 M^2
GLUCOSE SERPL-MCNC: 83 MG/DL (ref 70–110)
HCT VFR BLD AUTO: 36.2 % (ref 37–48.5)
HGB BLD-MCNC: 11.6 G/DL (ref 12–16)
IMM GRANULOCYTES # BLD AUTO: 0.1 K/UL (ref 0–0.04)
IMM GRANULOCYTES NFR BLD AUTO: 1.4 % (ref 0–0.5)
LYMPHOCYTES # BLD AUTO: 1.9 K/UL (ref 1–4.8)
LYMPHOCYTES NFR BLD: 25.2 % (ref 18–48)
MCH RBC QN AUTO: 25.6 PG (ref 27–31)
MCHC RBC AUTO-ENTMCNC: 32 G/DL (ref 32–36)
MCV RBC AUTO: 80 FL (ref 82–98)
MONOCYTES # BLD AUTO: 0.6 K/UL (ref 0.3–1)
MONOCYTES NFR BLD: 8.3 % (ref 4–15)
NEUTROPHILS # BLD AUTO: 4.7 K/UL (ref 1.8–7.7)
NEUTROPHILS NFR BLD: 63.9 % (ref 38–73)
NRBC BLD-RTO: 0 /100 WBC
PLATELET # BLD AUTO: 201 K/UL (ref 150–450)
PMV BLD AUTO: 10.5 FL (ref 9.2–12.9)
POCT GLUCOSE: 86 MG/DL (ref 70–110)
POTASSIUM SERPL-SCNC: 3.8 MMOL/L (ref 3.5–5.1)
PROT SERPL-MCNC: 6.7 G/DL (ref 6–8.4)
PROT UR-MCNC: <7 MG/DL (ref 0–15)
PROT/CREAT UR: ABNORMAL MG/G{CREAT} (ref 0–0.2)
RBC # BLD AUTO: 4.53 M/UL (ref 4–5.4)
SODIUM SERPL-SCNC: 139 MMOL/L (ref 136–145)
TSH SERPL DL<=0.005 MIU/L-ACNC: 2.5 UIU/ML (ref 0.4–4)
WBC # BLD AUTO: 7.38 K/UL (ref 3.9–12.7)

## 2024-10-17 PROCEDURE — 99211 OFF/OP EST MAY X REQ PHY/QHP: CPT

## 2024-10-17 PROCEDURE — 84443 ASSAY THYROID STIM HORMONE: CPT | Performed by: OBSTETRICS & GYNECOLOGY

## 2024-10-17 PROCEDURE — 85025 COMPLETE CBC W/AUTO DIFF WBC: CPT | Performed by: OBSTETRICS & GYNECOLOGY

## 2024-10-17 PROCEDURE — 82570 ASSAY OF URINE CREATININE: CPT | Performed by: OBSTETRICS & GYNECOLOGY

## 2024-10-17 PROCEDURE — 80053 COMPREHEN METABOLIC PANEL: CPT | Performed by: OBSTETRICS & GYNECOLOGY

## 2024-10-17 PROCEDURE — 99213 OFFICE O/P EST LOW 20 MIN: CPT | Mod: ,,, | Performed by: OBSTETRICS & GYNECOLOGY

## 2024-10-17 RX ORDER — ACETAMINOPHEN 500 MG
500 TABLET ORAL EVERY 6 HOURS PRN
Status: DISCONTINUED | OUTPATIENT
Start: 2024-10-17 | End: 2024-10-18 | Stop reason: HOSPADM

## 2024-10-17 RX ORDER — ONDANSETRON 8 MG/1
8 TABLET, ORALLY DISINTEGRATING ORAL EVERY 8 HOURS PRN
Status: DISCONTINUED | OUTPATIENT
Start: 2024-10-17 | End: 2024-10-18 | Stop reason: HOSPADM

## 2024-10-17 RX ORDER — SODIUM CHLORIDE, SODIUM LACTATE, POTASSIUM CHLORIDE, CALCIUM CHLORIDE 600; 310; 30; 20 MG/100ML; MG/100ML; MG/100ML; MG/100ML
INJECTION, SOLUTION INTRAVENOUS CONTINUOUS
Status: DISCONTINUED | OUTPATIENT
Start: 2024-10-17 | End: 2024-10-18 | Stop reason: HOSPADM

## 2024-10-18 ENCOUNTER — HOSPITAL ENCOUNTER (EMERGENCY)
Facility: HOSPITAL | Age: 19
Discharge: HOME | End: 2024-10-18
Attending: EMERGENCY MEDICINE
Payer: MEDICAID

## 2024-10-18 ENCOUNTER — TELEPHONE (OUTPATIENT)
Dept: OBSTETRICS AND GYNECOLOGY | Facility: CLINIC | Age: 19
End: 2024-10-18
Payer: MEDICAID

## 2024-10-18 ENCOUNTER — ROUTINE PRENATAL (OUTPATIENT)
Dept: OBSTETRICS AND GYNECOLOGY | Facility: CLINIC | Age: 19
End: 2024-10-18
Payer: MEDICAID

## 2024-10-18 VITALS
HEART RATE: 102 BPM | BODY MASS INDEX: 30.83 KG/M2 | OXYGEN SATURATION: 99 % | RESPIRATION RATE: 16 BRPM | SYSTOLIC BLOOD PRESSURE: 121 MMHG | DIASTOLIC BLOOD PRESSURE: 80 MMHG | TEMPERATURE: 98 F | WEIGHT: 191 LBS

## 2024-10-18 VITALS
DIASTOLIC BLOOD PRESSURE: 76 MMHG | BODY MASS INDEX: 30.64 KG/M2 | SYSTOLIC BLOOD PRESSURE: 122 MMHG | WEIGHT: 189.81 LBS

## 2024-10-18 DIAGNOSIS — R51.9 NONINTRACTABLE HEADACHE, UNSPECIFIED CHRONICITY PATTERN, UNSPECIFIED HEADACHE TYPE: ICD-10-CM

## 2024-10-18 DIAGNOSIS — Z3A.28 28 WEEKS GESTATION OF PREGNANCY: Primary | ICD-10-CM

## 2024-10-18 DIAGNOSIS — M43.6 NECK STIFFNESS: Primary | ICD-10-CM

## 2024-10-18 LAB
ALBUMIN SERPL BCP-MCNC: 2.8 G/DL (ref 3.5–5.2)
ALP SERPL-CCNC: 285 U/L (ref 40–150)
ALT SERPL W/O P-5'-P-CCNC: 10 U/L (ref 10–44)
ANION GAP SERPL CALC-SCNC: 7 MMOL/L (ref 8–16)
AST SERPL-CCNC: 14 U/L (ref 10–40)
BASOPHILS # BLD AUTO: 0.01 K/UL (ref 0–0.2)
BASOPHILS NFR BLD: 0.1 % (ref 0–1.9)
BILIRUB SERPL-MCNC: 0.2 MG/DL (ref 0.1–1)
BILIRUB UR QL STRIP: NEGATIVE
BUN SERPL-MCNC: 5 MG/DL (ref 6–20)
CALCIUM SERPL-MCNC: 9.3 MG/DL (ref 8.7–10.5)
CHLORIDE SERPL-SCNC: 111 MMOL/L (ref 95–110)
CLARITY UR: CLEAR
CO2 SERPL-SCNC: 19 MMOL/L (ref 23–29)
COLOR UR: YELLOW
CREAT SERPL-MCNC: 0.7 MG/DL (ref 0.5–1.4)
CREAT UR-MCNC: 91 MG/DL (ref 15–325)
DIFFERENTIAL METHOD BLD: ABNORMAL
EOSINOPHIL # BLD AUTO: 0.1 K/UL (ref 0–0.5)
EOSINOPHIL NFR BLD: 0.9 % (ref 0–8)
ERYTHROCYTE [DISTWIDTH] IN BLOOD BY AUTOMATED COUNT: 22.5 % (ref 11.5–14.5)
EST. GFR  (NO RACE VARIABLE): >60 ML/MIN/1.73 M^2
GLUCOSE SERPL-MCNC: 104 MG/DL (ref 70–110)
GLUCOSE UR QL STRIP: NEGATIVE
HCT VFR BLD AUTO: 35.8 % (ref 37–48.5)
HGB BLD-MCNC: 11.5 G/DL (ref 12–16)
HGB UR QL STRIP: NEGATIVE
IMM GRANULOCYTES # BLD AUTO: 0.05 K/UL (ref 0–0.04)
IMM GRANULOCYTES NFR BLD AUTO: 0.7 % (ref 0–0.5)
KETONES UR QL STRIP: NEGATIVE
LDH SERPL L TO P-CCNC: 223 U/L (ref 110–260)
LEUKOCYTE ESTERASE UR QL STRIP: NEGATIVE
LYMPHOCYTES # BLD AUTO: 2.1 K/UL (ref 1–4.8)
LYMPHOCYTES NFR BLD: 27.5 % (ref 18–48)
MCH RBC QN AUTO: 26.2 PG (ref 27–31)
MCHC RBC AUTO-ENTMCNC: 32.1 G/DL (ref 32–36)
MCV RBC AUTO: 82 FL (ref 82–98)
MONOCYTES # BLD AUTO: 0.6 K/UL (ref 0.3–1)
MONOCYTES NFR BLD: 7.8 % (ref 4–15)
NEUTROPHILS # BLD AUTO: 4.7 K/UL (ref 1.8–7.7)
NEUTROPHILS NFR BLD: 63 % (ref 38–73)
NITRITE UR QL STRIP: NEGATIVE
NRBC BLD-RTO: 0 /100 WBC
PH UR STRIP: 7 [PH] (ref 5–8)
PLATELET # BLD AUTO: 174 K/UL (ref 150–450)
PMV BLD AUTO: 10.6 FL (ref 9.2–12.9)
POTASSIUM SERPL-SCNC: 3.7 MMOL/L (ref 3.5–5.1)
PROT SERPL-MCNC: 6.5 G/DL (ref 6–8.4)
PROT UR QL STRIP: NEGATIVE
PROT UR-MCNC: 9 MG/DL (ref 0–15)
PROT/CREAT UR: 0.1 MG/G{CREAT} (ref 0–0.2)
RBC # BLD AUTO: 4.39 M/UL (ref 4–5.4)
SODIUM SERPL-SCNC: 137 MMOL/L (ref 136–145)
SP GR UR STRIP: 1.01 (ref 1–1.03)
URN SPEC COLLECT METH UR: NORMAL
UROBILINOGEN UR STRIP-ACNC: NEGATIVE EU/DL
WBC # BLD AUTO: 7.46 K/UL (ref 3.9–12.7)

## 2024-10-18 PROCEDURE — 25000003 PHARM REV CODE 250: Performed by: EMERGENCY MEDICINE

## 2024-10-18 PROCEDURE — 84156 ASSAY OF PROTEIN URINE: CPT | Performed by: EMERGENCY MEDICINE

## 2024-10-18 PROCEDURE — 81003 URINALYSIS AUTO W/O SCOPE: CPT | Performed by: EMERGENCY MEDICINE

## 2024-10-18 PROCEDURE — 99999 PR PBB SHADOW E&M-EST. PATIENT-LVL II: CPT | Mod: PBBFAC,,, | Performed by: ADVANCED PRACTICE MIDWIFE

## 2024-10-18 PROCEDURE — 99212 OFFICE O/P EST SF 10 MIN: CPT | Mod: PBBFAC | Performed by: ADVANCED PRACTICE MIDWIFE

## 2024-10-18 PROCEDURE — 82570 ASSAY OF URINE CREATININE: CPT | Performed by: EMERGENCY MEDICINE

## 2024-10-18 PROCEDURE — 99283 EMERGENCY DEPT VISIT LOW MDM: CPT | Mod: 27

## 2024-10-18 PROCEDURE — 83615 LACTATE (LD) (LDH) ENZYME: CPT | Performed by: EMERGENCY MEDICINE

## 2024-10-18 PROCEDURE — 85025 COMPLETE CBC W/AUTO DIFF WBC: CPT | Performed by: EMERGENCY MEDICINE

## 2024-10-18 PROCEDURE — 80053 COMPREHEN METABOLIC PANEL: CPT | Performed by: EMERGENCY MEDICINE

## 2024-10-18 RX ORDER — METOCLOPRAMIDE 5 MG/1
10 TABLET ORAL
Status: COMPLETED | OUTPATIENT
Start: 2024-10-18 | End: 2024-10-18

## 2024-10-18 RX ORDER — METOCLOPRAMIDE 10 MG/1
10 TABLET ORAL EVERY 6 HOURS PRN
Qty: 30 TABLET | Refills: 0 | Status: SHIPPED | OUTPATIENT
Start: 2024-10-18

## 2024-10-18 RX ADMIN — METOCLOPRAMIDE 10 MG: 5 TABLET ORAL at 04:10

## 2024-10-18 NOTE — SUBJECTIVE & OBJECTIVE
Obstetric HPI:  Patient reports None contractions, active fetal movement, No vaginal bleeding , No loss of fluid     This pregnancy has been complicated by   Elevated one hour GTT (upcoming 3hr GTT is scheduled)  Iron deficiency anemia, improved with oral iron gummies    OB History    Para Term  AB Living   1 0 0 0 0 0   SAB IAB Ectopic Multiple Live Births   0 0 0 0 0      # Outcome Date GA Lbr Alex/2nd Weight Sex Type Anes PTL Lv   1 Current              Past Medical History:   Diagnosis Date    ASCUS of cervix with negative high risk HPV 04/15/2024    Repeat pap in 6 months, 10/22/24    Irregular menstrual cycle     Labial cyst     lanced     No past surgical history on file.    No medications prior to admission.       Review of patient's allergies indicates:  No Known Allergies     Family History       Problem Relation (Age of Onset)    Diabetes Father    No Known Problems Mother          Tobacco Use    Smoking status: Never    Smokeless tobacco: Never   Substance and Sexual Activity    Alcohol use: Never    Drug use: Not Currently    Sexual activity: Yes     Partners: Male     Review of Systems   Constitutional:  Negative for fatigue, fever and unexpected weight change.   Respiratory:  Negative for shortness of breath and wheezing.    Cardiovascular:  Negative for chest pain and palpitations.   Gastrointestinal:  Negative for abdominal pain, constipation, diarrhea, nausea and vomiting.   Genitourinary:  Negative for dysuria, frequency, pelvic pain, urgency, vaginal bleeding, vaginal discharge, vaginal pain, postcoital bleeding and vaginal odor.   Neurological:  Positive for vertigo (dizziness). Negative for seizures and syncope.      Objective:     Vital Signs (Most Recent):    Vital Signs (24h Range):           There is no height or weight on file to calculate BMI.    FHT: Cat 1 (reassuring)  TOCO:  Q 0 minutes     Physical Exam:   Constitutional: She is oriented to person, place, and time.  She appears well-developed and well-nourished. No distress.    HENT:   Head: Normocephalic and atraumatic.     Neck: No thyromegaly present.     Pulmonary/Chest: Effort normal.        Abdominal: Soft. She exhibits no distension and no mass. There is no abdominal tenderness. There is no rebound and no guarding.   Uterus gravid, soft, and non-tender             Musculoskeletal: Edema (trace BL LE edema) present.       Neurological: She is alert and oriented to person, place, and time. She displays normal reflexes (DTR's 2+ bilaterally; no clonus).    Skin: No rash noted.    Psychiatric: She has a normal mood and affect. Her behavior is normal. Judgment and thought content normal.             Significant Labs:  Lab Results   Component Value Date    GROUPTRH O POS 05/06/2024    HEPBSAG Non-reactive 05/06/2024       Recent Lab Results       None

## 2024-10-18 NOTE — DISCHARGE SUMMARY
[facility] - [facility]  Obstetrics  Discharge Summary      Patient Name: Maria De Jesus Payne  MRN: [id number]  Admission Date: 10/17/2024  Hospital Length of Stay: 0 days  Discharge Date and Time:  10/17/2024 10:14 PM  Attending Physician: Ashley Richmond MD   Discharging Provider: Ashley Richmond MD   Primary Care Provider: Abena, Primary Doctor    HPI: 18 y/o G1 at 28w1d presents with persistent feeling of dizziness and lightheadedness.  States her symptoms started while she was sitting in the car, and she still feels like that.  No HA, change in vision, chest pain.  Felt slightly SOB while sitting in her car, but that has improved.  Pt has been eating well and staying hydrated with water and electrolyte drinks.  Denies any N/V.    FHT: Cat 1 (reassuring)  TOCO:  Q 0 minutes    * No surgery found *     Hospital Course:   BP with mild elevations, but no severe readings.  CBC, CMP unremarkable.  Urine P/C ratio unable to calculate due to no proteinuria  Pt reports that she feels better and dizziness has improved.  Stable for discharge to home.  Preeclampsia, labor, bleeding, and fetal movement precautions given.  Advised BP checks bid at home.  RTC next week for BP check         Final Active Diagnoses:    Diagnosis Date Noted POA    PRINCIPAL PROBLEM:  Dizziness [R42] 10/17/2024 Yes    Gestational hypertension, third trimester [O13.3] 10/17/2024 Yes    Abnormal glucose affecting pregnancy [O99.810] 10/16/2024 Yes      Problems Resolved During this Admission:        Significant Diagnostic Studies: Labs: CMP   Recent Labs   Lab 10/17/24  2041      K 3.8   *   CO2 19*   GLU 83   BUN 6   CREATININE 0.6   CALCIUM 9.5   PROT 6.7   ALBUMIN 2.9*   BILITOT 0.2   ALKPHOS 289*   AST 16   ALT 11   ANIONGAP 9   , CBC   Recent Labs   Lab 10/17/24  2041   WBC 7.38   HGB 11.6*   HCT 36.2*      , and urine P/C ratio unable to calculate      Feeding Method: N/A    Immunizations       None            This patient  has no babies on file.  Pending Diagnostic Studies:       None            Discharged Condition: good    Disposition: Home or Self Care    Follow Up:   Follow-up Information       Angy Rushing CNM Follow up in 1 week(s).    Specialty: Obstetrics and Gynecology  Why: routine OB visit with BP check  Contact information:  [address] [place] [address] [address] [address]  [phone]                           Patient Instructions:      Diet Adult Regular     No dressing needed     Notify your health care provider if you experience any of the following:  temperature >100.4     Notify your health care provider if you experience any of the following:  persistent nausea and vomiting or diarrhea     Notify your health care provider if you experience any of the following:  severe uncontrolled pain     Notify your health care provider if you experience any of the following:  redness, tenderness, or signs of infection (pain, swelling, redness, odor or green/yellow discharge around incision site)     Notify your health care provider if you experience any of the following:  difficulty breathing or increased cough     Notify your health care provider if you experience any of the following:  severe persistent headache     Notify your health care provider if you experience any of the following:  worsening rash     Notify your health care provider if you experience any of the following:  persistent dizziness, light-headedness, or visual disturbances     Notify your health care provider if you experience any of the following:  increased confusion or weakness     Activity as tolerated     Medications:  There are no discharge medications for this patient.      Ashley Richmond MD  Obstetrics  [facility]

## 2024-10-18 NOTE — HOSPITAL COURSE
BP with mild elevations, but no severe readings.  CBC, CMP unremarkable.  Urine P/C ratio unable to calculate due to no proteinuria  Pt reports that she feels better and dizziness has improved.  Stable for discharge to home.  Preeclampsia, labor, bleeding, and fetal movement precautions given.  Advised BP checks bid at home.  RTC next week for BP check

## 2024-10-18 NOTE — TELEPHONE ENCOUNTER
Pt called in regards to same ER follow up for Pre E. Pt states she was told to follow up with provider same day. Appt scheduled with Ms Duenas today for 1:15     Laurita SCHMITZ LPN  OB/GYN    \

## 2024-10-18 NOTE — TELEPHONE ENCOUNTER
----- Message from Arcamed sent at 10/18/2024 11:52 AM CDT -----  Type:  Same Day Appointment Request    Caller is requesting a same day appointment.  Caller declined first available appointment listed below.    Name of Caller: Maria De Jesus Payne    When is the first available appointment?-  Symptoms: 28 wks OB pulsing sensation in head   Best Call Back Number:\ [phone]    Additional Information: requesting SDA/ ER f/u please call

## 2024-10-18 NOTE — H&P
[facility] - [facility] Delivery  Obstetrics  History & Physical    Patient Name: Maria De Jesus Payne  MRN: [id number]  Admission Date: 10/17/2024  Primary Care Provider: Abena, Primary Doctor    Subjective:     Principal Problem:Dizziness    History of Present Illness:  18 y/o G1 at 28w1d presents with persistent feeling of dizziness and lightheadedness.  States her symptoms started while she was sitting in the car, and she still feels like that.  No HA, change in vision, chest pain.  Felt slightly SOB while sitting in her car, but that has improved.  Pt has been eating well and staying hydrated with water and electrolyte drinks.  Denies any N/V.    Obstetric HPI:  Patient reports None contractions, active fetal movement, No vaginal bleeding , No loss of fluid     This pregnancy has been complicated by   Elevated one hour GTT (upcoming 3hr GTT is scheduled)  Iron deficiency anemia, improved with oral iron gummies    OB History    Para Term  AB Living   1 0 0 0 0 0   SAB IAB Ectopic Multiple Live Births   0 0 0 0 0      # Outcome Date GA Lbr Alex/2nd Weight Sex Type Anes PTL Lv   1 Current              Past Medical History:   Diagnosis Date    ASCUS of cervix with negative high risk HPV 04/15/2024    Repeat pap in 6 months, 10/22/24    Irregular menstrual cycle     Labial cyst     lanced     No past surgical history on file.    No medications prior to admission.       Review of patient's allergies indicates:  No Known Allergies     Family History       Problem Relation (Age of Onset)    Diabetes Father    No Known Problems Mother          Tobacco Use    Smoking status: Never    Smokeless tobacco: Never   Substance and Sexual Activity    Alcohol use: Never    Drug use: Not Currently    Sexual activity: Yes     Partners: Male     Review of Systems   Constitutional:  Negative for fatigue, fever and unexpected weight change.   Respiratory:  Negative for shortness of breath and wheezing.    Cardiovascular:   Negative for chest pain and palpitations.   Gastrointestinal:  Negative for abdominal pain, constipation, diarrhea, nausea and vomiting.   Genitourinary:  Negative for dysuria, frequency, pelvic pain, urgency, vaginal bleeding, vaginal discharge, vaginal pain, postcoital bleeding and vaginal odor.   Neurological:  Positive for vertigo (dizziness). Negative for seizures and syncope.      Objective:     Vital Signs (Most Recent):    Vital Signs (24h Range):           There is no height or weight on file to calculate BMI.    FHT: Cat 1 (reassuring)  TOCO:  Q 0 minutes     Physical Exam:   Constitutional: She is oriented to person, place, and time. She appears well-developed and well-nourished. No distress.    HENT:   Head: Normocephalic and atraumatic.     Neck: No thyromegaly present.     Pulmonary/Chest: Effort normal.        Abdominal: Soft. She exhibits no distension and no mass. There is no abdominal tenderness. There is no rebound and no guarding.   Uterus gravid, soft, and non-tender             Musculoskeletal: Edema (trace BL LE edema) present.       Neurological: She is alert and oriented to person, place, and time. She displays normal reflexes (DTR's 2+ bilaterally; no clonus).    Skin: No rash noted.    Psychiatric: She has a normal mood and affect. Her behavior is normal. Judgment and thought content normal.             Significant Labs:  Lab Results   Component Value Date    GROUPTRH O POS 2024    HEPBSAG Non-reactive 2024       Recent Lab Results       None          Assessment/Plan:     19 y.o. female  at 28w1d for:    * Dizziness  Will further  evaluate elevated  BP to assess for preeclampsia  PO hydration  Avoid supine position    Elevated blood pressure reading  Check CBC, CMP, and urine P/C ratio    Abnormal glucose affecting pregnancy  Elevated one hour GTT; 3hr GTT scheduled  Accucheck on admit        Ashley Richmond MD  Obstetrics  [facility]

## 2024-10-18 NOTE — ED NOTES
48 year old  Chief Complaint   Patient presents with     Toe Pain     hit hoe on furniture 2 days ago       Blood pressure 115/77, pulse 65, temperature 97.9  F (36.6  C), temperature source Oral, weight 196 lb (88.9 kg), SpO2 96 %. Body mass index is 26.58 kg/(m^2).  Patient Active Problem List   Diagnosis     Vitamin D deficiency     Coronary artery disease involving native heart without angina pectoris, unspecified vessel or lesion type     Seasonal allergic rhinitis due to pollen     Allergy to food     Eosinophilic esophagitis     Fatigue, unspecified type     Benign essential hypertension     Genital herpes simplex     Hyperlipidemia LDL goal <100       Wt Readings from Last 2 Encounters:   06/22/17 196 lb (88.9 kg)   04/04/17 192 lb (87.1 kg)     BP Readings from Last 3 Encounters:   06/22/17 115/77   04/04/17 123/83         Current Outpatient Prescriptions   Medication     Albuterol Sulfate 108 (90 BASE) MCG/ACT AEPB     amLODIPine (NORVASC) 10 MG tablet     aspirin 81 MG tablet     loratadine (CLARITIN REDITABS) 10 MG ODT tab     lovastatin (MEVACOR) 40 MG tablet     omeprazole (PRILOSEC) 40 MG capsule     No current facility-administered medications for this visit.        Social History   Substance Use Topics     Smoking status: Never Smoker     Smokeless tobacco: Not on file     Alcohol use Yes       Health Maintenance Due   Topic Date Due     LIPID SCREEN Q5 YR MALE (SYSTEM ASSIGNED)  01/17/2002       No results found for: PAP      June 22, 2017 1:41 PM     Pt told urine sample is needed and was explained how to get clean catch

## 2024-10-18 NOTE — HPI
20 y/o G1 at 28w1d presents with persistent feeling of dizziness and lightheadedness.  States her symptoms started while she was sitting in the car, and she still feels like that.  No HA, change in vision, chest pain.  Felt slightly SOB while sitting in her car, but that has improved.  Pt has been eating well and staying hydrated with water and electrolyte drinks.  Denies any N/V.

## 2024-10-18 NOTE — PROGRESS NOTES
"19 y.o. female  at 28w2d   Reports + FM, denies VB, LOF or CTX    Presents today with c/o continued throbbing in the back of her head associated with neck stiffness. States this started yesterday and has been constant. Reports it is not painful but she can't focus, can't sleep, worse with movement, feels "like I'm hanging upside down"    Was at L&D yesterday for PIH workup/labs reviewed all WNL. Failed 1hr GTT with 3hr GTT scheduled for next week.    P/C ratio [facility] low      /76   Wt 86.1 kg (189 lb 13.1 oz)   LMP 2024   BMI 30.64 kg/m²       1. Neck stiffness     Discussed patient concerns with Dr. Payne who recommends going to ER for further evaluation, appears to be cleared from OB standpoint and may need neuro referral          "

## 2024-10-18 NOTE — ASSESSMENT & PLAN NOTE
Will further  evaluate elevated  BP to assess for preeclampsia  PO hydration  Avoid supine position   74 y/o male with PMHx of chronic Afib (previously on warfarin, has been held since recent procedure), hx CHF ?, HLD, HTN, GERD, prostate CA s/p prostatectomy >20 yrs ago, bladder CA recently underwent tumor resection 4 weeks ago @ [facility] s/p chronic edwards presents to the ED because he had blood work done and was told he had +blood cultures and to come in to the ED for further evaluation. Was seen in ED yesterday after he sustained fall due to weakness. Blood cultures and urine were drawn and patient was sent home. He was called back after his cultures grew klebsiella. He otherwise denies fevers. No covid symptoms (incidentally tested positive on 10/6). +worsening LE swelling. Edwards cath exchanged in ED. Pt admitted to med surg for:    #Sepsis due to klebsiella UTI 2/2 chronic edwards (POA), klebsiella bacteremia   #Hypotension   - BCx / UCx from 10/17 +Klebsiella pneumoniae , likely urinary origin given recent bladder tumor resection and placement of chronic edwards   - edwards exchanged on 10/17  - UA with moderate LE, large blood, TNTC bacteria  - BP 77/45 on admission --> 124/89 currently  - WBC 16.7 -> 10.6  -> 6.7  - Lactate 2.8 -> 1.7 s/p 3L IVF bolus , s/p maintenance IVF   - s/p cefepime, Continue IV CTX 2g daily per ID   - ID consult appreciated   - CTAP reviewed findings c/w pyelo/cysitis  - TTE with EF 60-65%  - Continue to HOLD home meds: Fosinopril, cardizem-> follows with Dr. Freeman, consulted  -- BP improving, Metoprolol resumed  -- Resumed Lasix 40mg PO , titrate accordingly     #Bladder cancer s/p tumor resection 4 weeks ago s/p chronic edwards   #Prostate cancer s/p prostatectomy   - had procedure done with [facility] Dr. Rodriguez   - AC never resumed post op , unclear why   - D/w Urologist Dr. Rodriguez at [facility]: safe to resume AC from surgical perspective, attempt TOV (edwards was supposed to be removed weeks ago in office, lost to f/u)   - Remove edwards cath -> TOV / bladder scan q6h , straight cath for PVR >350cc    #Worsening LE edema, hx CHF  - pt reports self-holding lasix after recent bladder procedure, LE swelling worsening since then  - Reume lasix at reduced dose (was on 120mg PO qd, Dr. Freeman recommends starting at 40mg QD and titrate PRN)   - monitor BP, re-introduce home meds as tolerated  - F/u cardiology Dr. Freeman     #chronic Afib  - pt previously on warfarin, has been held since recent procedure >4 weeks   - D/w Dr. Freeman, agrees with current plan, ok to resume AC from cardiology perspective   - Cleared to resume AC by surgeon   - C/w amiodarone, metoprolol    - AC service consulted, plan to resume coumadin tonight     #SANDHYA   - Cr 1.97 --> 1.17 -> 1.08 -> 1.00  - continue to trend renal function  - s/p IVF as above    #DVT ppx   - Coumadin   - Check INR daily     #Diet   - DASH    #Code  - Full    #Dispo  - Pending further clinical improvement

## 2024-10-18 NOTE — ED PROVIDER NOTES
"SCRIBE #1 NOTE: I, Herrera Clark, am scribing for, and in the presence of, Zack Arenas MD. I have scribed the entire note.       History     Chief Complaint   Patient presents with    Headache     Pt. C/o having a throbbing headache since yesterday. And was sent to the Ed by her OB for "scans". Pt is 28 weeks preg      Review of patient's allergies indicates:  No Known Allergies      History of Present Illness     HPI    10/18/2024, 4:13 PM  History obtained from the patient      History of Present Illness: Maria De Jesus Payne is a 19 y.o. female patient who is 28 weeks pregnant and presents to the Emergency Department for evaluation of a throbbing HA which is worse to the back left of her head and onset yesterday. Pt reports this throbbing is worse in there morning or when her heart rate is elevated. Pt denies any abd pain, vaginal discharge, vaginal bleeding, swelling, and all other sxs at this time. Pt was ruled out for preeclampsia yesterday at L&D. Pt took Tylenol at 7:00 this AM but hasn't taken any other tx. Pt reports she has been keeping hydrated. No further complaints or concerns at this time.       Arrival mode: Personal Transportation    PCP: No, Primary Doctor        Past Medical History:  Past Medical History:   Diagnosis Date    ASCUS of cervix with negative high risk HPV 04/15/2024    Repeat pap in 6 months, 10/22/24    Irregular menstrual cycle     Labial cyst 2022    lanced       Past Surgical History:  History reviewed. No pertinent surgical history.      Family History:  Family History   Problem Relation Name Age of Onset    Diabetes Father      No Known Problems Mother         Social History:  Social History     Tobacco Use    Smoking status: Never    Smokeless tobacco: Never   Substance and Sexual Activity    Alcohol use: Never    Drug use: Not Currently    Sexual activity: Yes     Partners: Male        Review of Systems     Review of Systems   Constitutional:  Negative for chills, " diaphoresis and fever.   HENT:  Negative for congestion.    Respiratory:  Negative for cough and shortness of breath.    Cardiovascular:  Negative for chest pain and leg swelling.   Gastrointestinal:  Negative for abdominal pain, diarrhea, nausea and vomiting.   Genitourinary:  Negative for dysuria, vaginal bleeding and vaginal discharge.   Musculoskeletal:  Negative for back pain.   Skin:  Negative for rash.   Neurological:  Positive for headaches (throbbing). Negative for dizziness and weakness.   All other systems reviewed and are negative.       Physical Exam     Initial Vitals [10/18/24 1514]   BP Pulse Resp Temp SpO2   (!) 133/98 (!) 130 18 98.1 °F (36.7 °C) 99 %      MAP       --          Physical Exam  Nursing Notes and Vital Signs Reviewed.  Constitutional: Patient is in no acute distress. Well-developed and well-nourished.  Head: Atraumatic. Normocephalic.  Eyes: EOM intact. Conjunctivae are not pale. No scleral icterus.  ENT: Mucous membranes are moist.   Neck: Supple. Full ROM.   Cardiovascular: Regular rate. Regular rhythm. No murmurs, rubs, or gallops.   Pulmonary/Chest: No respiratory distress. Clear to auscultation bilaterally. No wheezing or rales.  Abdominal: Soft and non-distended. Crabid fundus abd.  No rebound, guarding, or rigidity.   Musculoskeletal: Moves all extremities. No obvious deformities. No edema.   Skin: Warm and dry.  Neurological:  Alert, awake, and appropriate.  Normal speech.  No acute focal neurological deficits are appreciated.  Psychiatric: Normal affect. Good eye contact. Appropriate in content.        ED Course   Procedures  ED Vital Signs:  Vitals:    10/18/24 1514 10/18/24 1545 10/18/24 1558 10/18/24 1602   BP: (!) 133/98  123/83 131/86   Pulse: (!) 130 104     Resp: 18 18     Temp: 98.1 °F (36.7 °C)      TempSrc: Oral      SpO2: 99% 99%     Weight: 86.6 kg (191 lb)       10/18/24 1612 10/18/24 1631 10/18/24 1652 10/18/24 1722   BP: (S) (!) 147/90 133/84 130/85 129/84    Pulse:   109 105   Resp:   15 18   Temp:       TempSrc:       SpO2:   99% 100%   Weight:        10/18/24 1742   BP: 121/80   Pulse: 102   Resp: 16   Temp:    TempSrc:    SpO2: 99%   Weight:        Abnormal Lab Results:  Labs Reviewed   CBC W/ AUTO DIFFERENTIAL - Abnormal       Result Value    WBC 7.46      RBC 4.39      Hemoglobin 11.5 (*)     Hematocrit 35.8 (*)     MCV 82      MCH 26.2 (*)     MCHC 32.1      RDW 22.5 (*)     Platelets 174      MPV 10.6      Immature Granulocytes 0.7 (*)     Gran # (ANC) 4.7      Immature Grans (Abs) 0.05 (*)     Lymph # 2.1      Mono # 0.6      Eos # 0.1      Baso # 0.01      nRBC 0      Gran % 63.0      Lymph % 27.5      Mono % 7.8      Eosinophil % 0.9      Basophil % 0.1      Differential Method Automated     COMPREHENSIVE METABOLIC PANEL - Abnormal    Sodium 137      Potassium 3.7      Chloride 111 (*)     CO2 19 (*)     Glucose 104      BUN 5 (*)     Creatinine 0.7      Calcium 9.3      Total Protein 6.5      Albumin 2.8 (*)     Total Bilirubin 0.2      Alkaline Phosphatase 285 (*)     AST 14      ALT 10      eGFR >60      Anion Gap 7 (*)    URINALYSIS, REFLEX TO URINE CULTURE    Specimen UA Urine, Clean Catch      Color, UA Yellow      Appearance, UA Clear      pH, UA 7.0      Specific Gravity, UA 1.015      Protein, UA Negative      Glucose, UA Negative      Ketones, UA Negative      Bilirubin (UA) Negative      Occult Blood UA Negative      Nitrite, UA Negative      Urobilinogen, UA Negative      Leukocytes, UA Negative      Narrative:     Specimen Source->Urine   LACTATE DEHYDROGENASE         PROTEIN / CREATININE RATIO, URINE    Protein, Urine Random 9      Creatinine, Urine 91.0      Prot/Creat Ratio, Urine 0.10      Narrative:     Specimen Source->Urine        All Lab Results:  Results for orders placed or performed during the hospital encounter of 10/18/24   CBC auto differential    Collection Time: 10/18/24  4:19 PM   Result Value Ref Range    WBC 7.46 3.90 -  12.70 K/uL    RBC 4.39 4.00 - 5.40 M/uL    Hemoglobin 11.5 (L) 12.0 - 16.0 g/dL    Hematocrit 35.8 (L) 37.0 - 48.5 %    MCV 82 82 - 98 fL    MCH 26.2 (L) 27.0 - 31.0 pg    MCHC 32.1 32.0 - 36.0 g/dL    RDW 22.5 (H) 11.5 - 14.5 %    Platelets 174 150 - 450 K/uL    MPV 10.6 9.2 - 12.9 fL    Immature Granulocytes 0.7 (H) 0.0 - 0.5 %    Gran # (ANC) 4.7 1.8 - 7.7 K/uL    Immature Grans (Abs) 0.05 (H) 0.00 - 0.04 K/uL    Lymph # 2.1 1.0 - 4.8 K/uL    Mono # 0.6 0.3 - 1.0 K/uL    Eos # 0.1 0.0 - 0.5 K/uL    Baso # 0.01 0.00 - 0.20 K/uL    nRBC 0 0 /100 WBC    Gran % 63.0 38.0 - 73.0 %    Lymph % 27.5 18.0 - 48.0 %    Mono % 7.8 4.0 - 15.0 %    Eosinophil % 0.9 0.0 - 8.0 %    Basophil % 0.1 0.0 - 1.9 %    Differential Method Automated    Comprehensive metabolic panel    Collection Time: 10/18/24  4:19 PM   Result Value Ref Range    Sodium 137 136 - 145 mmol/L    Potassium 3.7 3.5 - 5.1 mmol/L    Chloride 111 (H) 95 - 110 mmol/L    CO2 19 (L) 23 - 29 mmol/L    Glucose 104 70 - 110 mg/dL    BUN 5 (L) 6 - 20 mg/dL    Creatinine 0.7 0.5 - 1.4 mg/dL    Calcium 9.3 8.7 - 10.5 mg/dL    Total Protein 6.5 6.0 - 8.4 g/dL    Albumin 2.8 (L) 3.5 - 5.2 g/dL    Total Bilirubin 0.2 0.1 - 1.0 mg/dL    Alkaline Phosphatase 285 (H) 40 - 150 U/L    AST 14 10 - 40 U/L    ALT 10 10 - 44 U/L    eGFR >60 >60 mL/min/1.73 m^2    Anion Gap 7 (L) 8 - 16 mmol/L   Lactate dehydrogenase    Collection Time: 10/18/24  4:19 PM   Result Value Ref Range     110 - 260 U/L   Urinalysis, Reflex to Urine Culture Urine, Clean Catch    Collection Time: 10/18/24  4:26 PM    Specimen: Urine   Result Value Ref Range    Specimen UA Urine, Clean Catch     Color, UA Yellow Yellow, Straw, Leanne    Appearance, UA Clear Clear    pH, UA 7.0 5.0 - 8.0    Specific Gravity, UA 1.015 1.005 - 1.030    Protein, UA Negative Negative    Glucose, UA Negative Negative    Ketones, UA Negative Negative    Bilirubin (UA) Negative Negative    Occult Blood UA Negative Negative     Nitrite, UA Negative Negative    Urobilinogen, UA Negative <2.0 EU/dL    Leukocytes, UA Negative Negative   Protein / creatinine ratio, urine    Collection Time: 10/18/24  4:26 PM   Result Value Ref Range    Protein, Urine Random 9 0 - 15 mg/dL    Creatinine, Urine 91.0 15.0 - 325.0 mg/dL    Prot/Creat Ratio, Urine 0.10 0.00 - 0.20         Imaging Results:  Imaging Results    None                   The Emergency Provider reviewed the vital signs and test results, which are outlined above.     ED Discussion     5:35 PM: Reassessed pt at this time. Discussed with pt all pertinent ED information and results. Discussed pt dx and plan of tx. Gave pt all f/u and return to the ED instructions. All questions and concerns were addressed at this time. Pt expresses understanding of information and instructions, and is comfortable with plan to discharge. Pt is stable for discharge.    I discussed with patient and/or family/caretaker that evaluation in the ED does not suggest any emergent or life threatening medical conditions requiring immediate intervention beyond what was provided in the ED, and I believe patient is safe for discharge.  Regardless, an unremarkable evaluation in the ED does not preclude the development or presence of a serious of life threatening condition. As such, patient was instructed to return immediately for any worsening or change in current symptoms.     ED Course as of 10/18/24 2348   Fri Oct 18, 2024   1613 Goal Systolic BP of 140 - 150 mmHg and a Diastolic BP of  mmHg. [BA]   1626 She is having a very uncomfortable throbbing in her left occipital region, but she has no neuro deficits/red flags that would suggest an overly concerning process. No vision changes/loss, neck stiffness, thunderclap symptoms, numbness/weakness, fever, excess vomiting, photophobia, etc. She did take some tylenol today, but I was going to give her reglan to see is that helps her headache. We can likely avoid imaging if  she is feeling better after treatment and workup.  [BA]   1730 Patient is feeling better with treatment [BA]      ED Course User Index  [BA] Zack Arenas MD     Medical Decision Making  Amount and/or Complexity of Data Reviewed  External Data Reviewed: notes.     Details: Patient seen at L&D yesterday and had Pre-E r/o.   Labs: ordered. Decision-making details documented in ED Course.    Risk  Prescription drug management.                ED Medication(s):  Medications   metoclopramide HCl tablet 10 mg (10 mg Oral Given 10/18/24 1631)       Discharge Medication List as of 10/18/2024  5:32 PM        START taking these medications    Details   metoclopramide HCl (REGLAN) 10 MG tablet Take 1 tablet (10 mg total) by mouth every 6 (six) hours as needed (headache or nausea)., Starting Fri 10/18/2024, Print              Follow-up Information       Yulissa Pina CNM. Call in 1 day.    Specialty: Obstetrics and Gynecology  Why: For re-evaluation and further treatment  Contact information:  [address] [place] [address] [phone]               [facility] - Emergency Dept.. Go today.    Specialty: Emergency Medicine  Why: If symptoms worsen, For re-evaluation and further treatment, As needed  Contact information:  [id number]  [facility] [address] [phone]                               Scribe Attestation:   Scribe #1: I performed the above scribed service and the documentation accurately describes the services I performed. I attest to the accuracy of the note.     Attending:   Physician Attestation Statement for Scribe #1: I, Zack Arenas MD, personally performed the services described in this documentation, as scribed by Herrera Clark, in my presence, and it is both accurate and complete.           Clinical Impression       ICD-10-CM ICD-9-CM   1. 28 weeks gestation of pregnancy  Z3A.28 V22.2   2. Nonintractable headache, unspecified chronicity pattern,  unspecified headache type  R51.9 784.0       Disposition:   Disposition: Discharged  Condition: Stable         Zack Arenas MD  10/18/24 2144

## 2024-10-21 ENCOUNTER — LAB VISIT (OUTPATIENT)
Dept: LAB | Facility: HOSPITAL | Age: 19
End: 2024-10-21
Attending: ADVANCED PRACTICE MIDWIFE
Payer: MEDICAID

## 2024-10-21 DIAGNOSIS — O99.810 ABNORMAL GLUCOSE AFFECTING PREGNANCY: ICD-10-CM

## 2024-10-21 LAB
GLUCOSE SERPL-MCNC: 108 MG/DL
GLUCOSE SERPL-MCNC: 70 MG/DL (ref 70–110)
GLUCOSE SERPL-MCNC: 89 MG/DL
GLUCOSE SERPL-MCNC: 93 MG/DL

## 2024-10-21 PROCEDURE — 82952 GTT-ADDED SAMPLES: CPT | Performed by: ADVANCED PRACTICE MIDWIFE

## 2024-10-21 PROCEDURE — 82951 GLUCOSE TOLERANCE TEST (GTT): CPT | Performed by: ADVANCED PRACTICE MIDWIFE

## 2024-10-21 PROCEDURE — 36415 COLL VENOUS BLD VENIPUNCTURE: CPT | Performed by: ADVANCED PRACTICE MIDWIFE

## 2024-10-29 ENCOUNTER — ROUTINE PRENATAL (OUTPATIENT)
Dept: OBSTETRICS AND GYNECOLOGY | Facility: CLINIC | Age: 19
End: 2024-10-29
Payer: MEDICAID

## 2024-10-29 VITALS
DIASTOLIC BLOOD PRESSURE: 76 MMHG | BODY MASS INDEX: 30.96 KG/M2 | WEIGHT: 191.81 LBS | SYSTOLIC BLOOD PRESSURE: 118 MMHG

## 2024-10-29 DIAGNOSIS — Z34.02 ENCOUNTER FOR SUPERVISION OF NORMAL FIRST PREGNANCY IN SECOND TRIMESTER: ICD-10-CM

## 2024-10-29 DIAGNOSIS — O13.3 GESTATIONAL HYPERTENSION, THIRD TRIMESTER: ICD-10-CM

## 2024-10-29 DIAGNOSIS — O99.810 ABNORMAL GLUCOSE AFFECTING PREGNANCY: ICD-10-CM

## 2024-10-29 DIAGNOSIS — O99.019 ANTEPARTUM ANEMIA: Primary | ICD-10-CM

## 2024-10-29 PROCEDURE — 99999 PR PBB SHADOW E&M-EST. PATIENT-LVL III: CPT | Mod: PBBFAC,,, | Performed by: ADVANCED PRACTICE MIDWIFE

## 2024-10-29 PROCEDURE — 99214 OFFICE O/P EST MOD 30 MIN: CPT | Mod: TH,S$PBB,UC, | Performed by: ADVANCED PRACTICE MIDWIFE

## 2024-10-29 PROCEDURE — 99213 OFFICE O/P EST LOW 20 MIN: CPT | Mod: PBBFAC,TH | Performed by: ADVANCED PRACTICE MIDWIFE

## 2024-10-30 ENCOUNTER — PATIENT MESSAGE (OUTPATIENT)
Dept: OTHER | Facility: OTHER | Age: 19
End: 2024-10-30
Payer: MEDICAID

## 2024-11-02 ENCOUNTER — HOSPITAL ENCOUNTER (OUTPATIENT)
Facility: HOSPITAL | Age: 19
Discharge: HOME | End: 2024-11-02
Attending: OBSTETRICS & GYNECOLOGY | Admitting: OBSTETRICS & GYNECOLOGY
Payer: MEDICAID

## 2024-11-02 VITALS — HEART RATE: 74 BPM | DIASTOLIC BLOOD PRESSURE: 80 MMHG | OXYGEN SATURATION: 100 % | SYSTOLIC BLOOD PRESSURE: 140 MMHG

## 2024-11-02 DIAGNOSIS — O13.3 GESTATIONAL HYPERTENSION, THIRD TRIMESTER: ICD-10-CM

## 2024-11-02 DIAGNOSIS — O13.3 GESTATIONAL HYPERTENSION, THIRD TRIMESTER: Primary | ICD-10-CM

## 2024-11-02 LAB
ALBUMIN SERPL BCP-MCNC: 3.1 G/DL (ref 3.5–5.2)
ALP SERPL-CCNC: 437 U/L (ref 40–150)
ALT SERPL W/O P-5'-P-CCNC: 9 U/L (ref 10–44)
ANION GAP SERPL CALC-SCNC: 11 MMOL/L (ref 8–16)
ANISOCYTOSIS BLD QL SMEAR: SLIGHT
AST SERPL-CCNC: 15 U/L (ref 10–40)
BASOPHILS # BLD AUTO: 0.03 K/UL (ref 0–0.2)
BASOPHILS NFR BLD: 0.3 % (ref 0–1.9)
BILIRUB SERPL-MCNC: 0.2 MG/DL (ref 0.1–1)
BUN SERPL-MCNC: 8 MG/DL (ref 6–20)
CALCIUM SERPL-MCNC: 9.9 MG/DL (ref 8.7–10.5)
CHLORIDE SERPL-SCNC: 110 MMOL/L (ref 95–110)
CO2 SERPL-SCNC: 16 MMOL/L (ref 23–29)
CREAT SERPL-MCNC: 0.7 MG/DL (ref 0.5–1.4)
CREAT UR-MCNC: 63.3 MG/DL (ref 15–325)
DIFFERENTIAL METHOD BLD: ABNORMAL
EOSINOPHIL # BLD AUTO: 0.1 K/UL (ref 0–0.5)
EOSINOPHIL NFR BLD: 1 % (ref 0–8)
ERYTHROCYTE [DISTWIDTH] IN BLOOD BY AUTOMATED COUNT: 20.2 % (ref 11.5–14.5)
EST. GFR  (NO RACE VARIABLE): >60 ML/MIN/1.73 M^2
GLUCOSE SERPL-MCNC: 104 MG/DL (ref 70–110)
HCT VFR BLD AUTO: 38.9 % (ref 37–48.5)
HGB BLD-MCNC: 12.6 G/DL (ref 12–16)
IMM GRANULOCYTES # BLD AUTO: 0.06 K/UL (ref 0–0.04)
IMM GRANULOCYTES NFR BLD AUTO: 0.7 % (ref 0–0.5)
LYMPHOCYTES # BLD AUTO: 2.5 K/UL (ref 1–4.8)
LYMPHOCYTES NFR BLD: 28.2 % (ref 18–48)
MCH RBC QN AUTO: 26.3 PG (ref 27–31)
MCHC RBC AUTO-ENTMCNC: 32.4 G/DL (ref 32–36)
MCV RBC AUTO: 81 FL (ref 82–98)
MONOCYTES # BLD AUTO: 0.8 K/UL (ref 0.3–1)
MONOCYTES NFR BLD: 9.2 % (ref 4–15)
NEUTROPHILS # BLD AUTO: 5.3 K/UL (ref 1.8–7.7)
NEUTROPHILS NFR BLD: 60.6 % (ref 38–73)
NRBC BLD-RTO: 0 /100 WBC
OVALOCYTES BLD QL SMEAR: ABNORMAL
PLATELET # BLD AUTO: 244 K/UL (ref 150–450)
PLATELET BLD QL SMEAR: ABNORMAL
PMV BLD AUTO: 11.3 FL (ref 9.2–12.9)
POTASSIUM SERPL-SCNC: 4.1 MMOL/L (ref 3.5–5.1)
PROT SERPL-MCNC: 7.2 G/DL (ref 6–8.4)
PROT UR-MCNC: 11 MG/DL (ref 0–15)
PROT/CREAT UR: 0.17 MG/G{CREAT} (ref 0–0.2)
RBC # BLD AUTO: 4.8 M/UL (ref 4–5.4)
SODIUM SERPL-SCNC: 137 MMOL/L (ref 136–145)
WBC # BLD AUTO: 8.8 K/UL (ref 3.9–12.7)

## 2024-11-02 PROCEDURE — 85025 COMPLETE CBC W/AUTO DIFF WBC: CPT | Performed by: MIDWIFE

## 2024-11-02 PROCEDURE — 80053 COMPREHEN METABOLIC PANEL: CPT | Performed by: MIDWIFE

## 2024-11-02 PROCEDURE — 82570 ASSAY OF URINE CREATININE: CPT | Performed by: MIDWIFE

## 2024-11-02 PROCEDURE — 59025 FETAL NON-STRESS TEST: CPT

## 2024-11-02 PROCEDURE — 59025 FETAL NON-STRESS TEST: CPT | Mod: 26,,, | Performed by: MIDWIFE

## 2024-11-02 PROCEDURE — 99213 OFFICE O/P EST LOW 20 MIN: CPT | Mod: TH,25,, | Performed by: MIDWIFE

## 2024-11-02 RX ORDER — ACETAMINOPHEN 500 MG
500 TABLET ORAL EVERY 6 HOURS PRN
Status: DISCONTINUED | OUTPATIENT
Start: 2024-11-02 | End: 2024-11-02 | Stop reason: HOSPADM

## 2024-11-02 RX ORDER — ONDANSETRON 8 MG/1
8 TABLET, ORALLY DISINTEGRATING ORAL EVERY 8 HOURS PRN
Status: DISCONTINUED | OUTPATIENT
Start: 2024-11-02 | End: 2024-11-02 | Stop reason: HOSPADM

## 2024-11-02 RX ORDER — SODIUM CHLORIDE, SODIUM LACTATE, POTASSIUM CHLORIDE, CALCIUM CHLORIDE 600; 310; 30; 20 MG/100ML; MG/100ML; MG/100ML; MG/100ML
INJECTION, SOLUTION INTRAVENOUS CONTINUOUS
Status: DISCONTINUED | OUTPATIENT
Start: 2024-11-02 | End: 2024-11-02 | Stop reason: HOSPADM

## 2024-11-02 NOTE — SUBJECTIVE & OBJECTIVE
Obstetric HPI:  Patient reports None contractions, active fetal movement, No vaginal bleeding , No loss of fluid     This pregnancy has been complicated by:  GHTN  Anemia  Dizziness     OB History    Para Term  AB Living   1 0 0 0 0 0   SAB IAB Ectopic Multiple Live Births   0 0 0 0 0      # Outcome Date GA Lbr Alex/2nd Weight Sex Type Anes PTL Lv   1 Current              Past Medical History:   Diagnosis Date    ASCUS of cervix with negative high risk HPV 04/15/2024    Repeat pap in 6 months, 10/22/24    Irregular menstrual cycle     Labial cyst     lanced     History reviewed. No pertinent surgical history.    PTA Medications   Medication Sig    metoclopramide HCl (REGLAN) 10 MG tablet Take 1 tablet (10 mg total) by mouth every 6 (six) hours as needed (headache or nausea). (Patient not taking: Reported on 10/29/2024)       Review of patient's allergies indicates:  No Known Allergies     Family History       Problem Relation (Age of Onset)    Diabetes Father    No Known Problems Mother          Tobacco Use    Smoking status: Never     Passive exposure: Never    Smokeless tobacco: Never   Substance and Sexual Activity    Alcohol use: Never    Drug use: Not Currently    Sexual activity: Yes     Partners: Male     Review of Systems   Constitutional:  Positive for activity change.   Cardiovascular:  Positive for chest pain.   Neurological:  Positive for syncope.      Objective:     Vital Signs (Most Recent):  Pulse: 74 (24 0351)  BP: (!) 140/80 (24 0347)  SpO2: 100 % (24 0351) Vital Signs (24h Range):  Pulse:  [63-91] 74  SpO2:  [93 %-100 %] 100 %  BP: (130-147)/(77-98) 140/80        There is no height or weight on file to calculate BMI.    FHT: 145 Cat 1 (reassuring)  TOCO:  none     Physical Exam:   Constitutional: She is oriented to person, place, and time. She appears well-developed and well-nourished.    HENT:   Head: Normocephalic.    Eyes: Conjunctivae are normal.       Pulmonary/Chest: Effort normal.        Abdominal: Soft.             Musculoskeletal: Normal range of motion.       Neurological: She is alert and oriented to person, place, and time.    Skin: Skin is warm and dry.    Psychiatric: She has a normal mood and affect. Her behavior is normal. Judgment and thought content normal.        Cervix: deferred      Significant Labs:  Lab Results   Component Value Date    GROUPTRH O POS 05/06/2024    HEPBSAG Non-reactive 05/06/2024       I have personallly reviewed all pertinent lab results from the last 24 hours.

## 2024-11-02 NOTE — PROCEDURES
Maria De Jesus Payne is a 19 y.o. female patient.    Pulse: 74 (11/02/24 0351)  BP: (!) 140/80 (11/02/24 0347)  SpO2: 100 % (11/02/24 0351)       Obtain Fetal nonstress test (NST)    Date/Time: 11/2/2024 4:14 AM    Performed by: Yeni Brooke CNM  Authorized by: Yeni Brooke CNM    Nonstress Test:     Variability:  6-25 BPM    Decelerations:  None    Accelerations:  15 bpm    Acoustic Stimulator: No      Baseline:  145    Uterine Irritability: No      Contractions:  Not present  Biophysical Profile:     Nonstress Test Interpretation: reactive      Overall Impression:  Reassuring  Post-procedure:     Patient tolerance:  Patient tolerated the procedure well with no immediate complications    11/2/2024

## 2024-11-02 NOTE — H&P
[facility] - Labor & Delivery  Obstetrics  History & Physical    Patient Name: Maria De Jesus Payne  MRN: [id number]  Admission Date: 2024  Primary Care Provider: Abena, Primary Doctor    Subjective:     Principal Problem:Gestational hypertension, third trimester    History of Present Illness:  19 y.o.  ILENE 2025 EGA 30w3d c/o elevated blood pressure reading at home and dizziness.     Obstetric HPI:  Patient reports None contractions, active fetal movement, No vaginal bleeding , No loss of fluid     This pregnancy has been complicated by:  GHTN  Anemia  Dizziness     OB History    Para Term  AB Living   1 0 0 0 0 0   SAB IAB Ectopic Multiple Live Births   0 0 0 0 0      # Outcome Date GA Lbr Alex/2nd Weight Sex Type Anes PTL Lv   1 Current              Past Medical History:   Diagnosis Date    ASCUS of cervix with negative high risk HPV 04/15/2024    Repeat pap in 6 months, 10/22/24    Irregular menstrual cycle     Labial cyst     lanced     History reviewed. No pertinent surgical history.    PTA Medications   Medication Sig    metoclopramide HCl (REGLAN) 10 MG tablet Take 1 tablet (10 mg total) by mouth every 6 (six) hours as needed (headache or nausea). (Patient not taking: Reported on 10/29/2024)       Review of patient's allergies indicates:  No Known Allergies     Family History       Problem Relation (Age of Onset)    Diabetes Father    No Known Problems Mother          Tobacco Use    Smoking status: Never     Passive exposure: Never    Smokeless tobacco: Never   Substance and Sexual Activity    Alcohol use: Never    Drug use: Not Currently    Sexual activity: Yes     Partners: Male     Review of Systems   Constitutional:  Positive for activity change.   Cardiovascular:  Positive for chest pain.   Neurological:  Positive for syncope.      Objective:     Vital Signs (Most Recent):  Pulse: 74 (24 0351)  BP: (!) 140/80 (24 0347)  SpO2: 100 % (24 0351) Vital Signs (24h  Range):  Pulse:  [63-91] 74  SpO2:  [93 %-100 %] 100 %  BP: (130-147)/(77-98) 140/80        There is no height or weight on file to calculate BMI.    FHT: 145 Cat 1 (reassuring)  TOCO:  none     Physical Exam:   Constitutional: She is oriented to person, place, and time. She appears well-developed and well-nourished.    HENT:   Head: Normocephalic.    Eyes: Conjunctivae are normal.      Pulmonary/Chest: Effort normal.        Abdominal: Soft.             Musculoskeletal: Normal range of motion.       Neurological: She is alert and oriented to person, place, and time.    Skin: Skin is warm and dry.    Psychiatric: She has a normal mood and affect. Her behavior is normal. Judgment and thought content normal.        Cervix: deferred      Significant Labs:  Lab Results   Component Value Date    GROUPTRH O POS 2024    HEPBSAG Non-reactive 2024       I have personallly reviewed all pertinent lab results from the last 24 hours.  Assessment/Plan:     19 y.o. female  at 30w3d for:    * Gestational hypertension, third trimester  NST   Serial blood pressure readings   Pre-eclampsia work up    0410am- PCR 0.17, Plt 244, liver enzymes wnl. BP in mild elevation range 130-140/80-90s. Discussed POC with on call MD. Pt stable for discharge home with close monitoring of BP readings from home. Will begin weekly US in clinic at 32w. US added to next apt. Pre-eclampsia s/s stressed and pt urged to report back to hospital with change in symptoms or with moderate to severe elevation in BP readings. Limit sodium in diet. Routine discharge instructions reviewed. LD number given.         Yeni Brooke CNM  Obstetrics  [facility] - Labor & Delivery

## 2024-11-04 ENCOUNTER — TELEPHONE (OUTPATIENT)
Dept: HEMATOLOGY/ONCOLOGY | Facility: CLINIC | Age: 19
End: 2024-11-04
Payer: MEDICAID

## 2024-11-08 ENCOUNTER — TELEPHONE (OUTPATIENT)
Dept: HEMATOLOGY/ONCOLOGY | Facility: CLINIC | Age: 19
End: 2024-11-08
Payer: MEDICAID

## 2024-11-08 ENCOUNTER — LAB VISIT (OUTPATIENT)
Dept: LAB | Facility: HOSPITAL | Age: 19
End: 2024-11-08
Attending: INTERNAL MEDICINE
Payer: MEDICAID

## 2024-11-08 DIAGNOSIS — O99.019 ANTEPARTUM ANEMIA: ICD-10-CM

## 2024-11-08 LAB
BASOPHILS # BLD AUTO: 0.02 K/UL (ref 0–0.2)
BASOPHILS NFR BLD: 0.3 % (ref 0–1.9)
DIFFERENTIAL METHOD BLD: ABNORMAL
EOSINOPHIL # BLD AUTO: 0.1 K/UL (ref 0–0.5)
EOSINOPHIL NFR BLD: 1.5 % (ref 0–8)
ERYTHROCYTE [DISTWIDTH] IN BLOOD BY AUTOMATED COUNT: 20.1 % (ref 11.5–14.5)
FERRITIN SERPL-MCNC: 22 NG/ML (ref 20–300)
HCT VFR BLD AUTO: 38.8 % (ref 37–48.5)
HGB BLD-MCNC: 12.1 G/DL (ref 12–16)
IMM GRANULOCYTES # BLD AUTO: 0.03 K/UL (ref 0–0.04)
IMM GRANULOCYTES NFR BLD AUTO: 0.4 % (ref 0–0.5)
LYMPHOCYTES # BLD AUTO: 2 K/UL (ref 1–4.8)
LYMPHOCYTES NFR BLD: 27.2 % (ref 18–48)
MCH RBC QN AUTO: 26.5 PG (ref 27–31)
MCHC RBC AUTO-ENTMCNC: 31.2 G/DL (ref 32–36)
MCV RBC AUTO: 85 FL (ref 82–98)
MONOCYTES # BLD AUTO: 0.7 K/UL (ref 0.3–1)
MONOCYTES NFR BLD: 9.1 % (ref 4–15)
NEUTROPHILS # BLD AUTO: 4.4 K/UL (ref 1.8–7.7)
NEUTROPHILS NFR BLD: 61.5 % (ref 38–73)
NRBC BLD-RTO: 0 /100 WBC
PLATELET # BLD AUTO: 200 K/UL (ref 150–450)
PMV BLD AUTO: 11.9 FL (ref 9.2–12.9)
RBC # BLD AUTO: 4.56 M/UL (ref 4–5.4)
WBC # BLD AUTO: 7.16 K/UL (ref 3.9–12.7)

## 2024-11-08 PROCEDURE — 36415 COLL VENOUS BLD VENIPUNCTURE: CPT | Performed by: INTERNAL MEDICINE

## 2024-11-08 PROCEDURE — 82728 ASSAY OF FERRITIN: CPT | Performed by: INTERNAL MEDICINE

## 2024-11-08 PROCEDURE — 85025 COMPLETE CBC W/AUTO DIFF WBC: CPT | Performed by: INTERNAL MEDICINE

## 2024-11-11 ENCOUNTER — OFFICE VISIT (OUTPATIENT)
Dept: HEMATOLOGY/ONCOLOGY | Facility: CLINIC | Age: 19
End: 2024-11-11
Payer: MEDICAID

## 2024-11-11 DIAGNOSIS — O99.019 ANTEPARTUM ANEMIA: Primary | ICD-10-CM

## 2024-11-11 PROCEDURE — 99214 OFFICE O/P EST MOD 30 MIN: CPT | Mod: 95,,, | Performed by: INTERNAL MEDICINE

## 2024-11-11 NOTE — PROGRESS NOTES
The patient location is: home  Visit type: Virtual visit with synchronous audio and video  Face-to-face or time spent with patient on the encounter: 25 min  Total time spent on and for  this encounter which includes non face-to-face time preparing to see patient, review of tests, obtaining and or reviewing separately obtained records documenting clinical information in the electronic or other health records, independently interpreting results which is not separately reported ,and communicating results to the patient/family/caregiver and in care coordination and treatment planning/communicating with pharmacy for prescriptions/addressing social needs/arranging follow-up and or referrals : 25 min     Each patient I provide medical services by telemedicine is:  (1) informed of the relationship between the physician and patient and the respective role of any other health care provider with respect to management of the patient; and (2) notified that he or she may decline to receive medical services by telemedicine and may withdraw from such care at any time.  This is a video visit therefore some elements of the physical exam such as vital signs, heart sounds are breath sounds are not included and may be included if found in recent clinic notes of other providers assessing same patient. Any symptoms or signs that were visualized were stated by the patient may be included in this note.      Service Date:  11/11/24    Chief Complaint: iron deficiency anemia    Maria De Jesus Payne is a 19 y.o. female here with iron deficiency anemia secondary to pregnancy.  Patient has been taking oral iron gummies as it is the only type of oral iron she is able to hold down.  She is still tolerating this well.  She is feeling good today.  She has no complaints to me.     Review of Systems   Constitutional: Negative.  Negative for appetite change and unexpected weight change.   HENT: Negative.  Negative for mouth sores.    Eyes: Negative.   Negative for visual disturbance.   Respiratory: Negative.  Negative for cough and shortness of breath.    Cardiovascular: Negative.  Negative for chest pain.   Gastrointestinal: Negative.  Negative for abdominal pain and diarrhea.   Endocrine: Negative.    Genitourinary:  Positive for frequency.   Musculoskeletal: Negative.  Negative for back pain.   Integumentary:  Negative for rash. Negative.   Neurological: Negative.  Negative for headaches.   Hematological: Negative.  Negative for adenopathy.   Psychiatric/Behavioral: Negative.  The patient is not nervous/anxious.         Current Outpatient Medications   Medication Instructions    metoclopramide HCl (REGLAN) 10 mg, Oral, Every 6 hours PRN        Past Medical History:   Diagnosis Date    ASCUS of cervix with negative high risk HPV 04/15/2024    Repeat pap in 6 months, 10/22/24    Irregular menstrual cycle     Labial cyst 2022    lanced        No past surgical history on file.     Family History   Problem Relation Name Age of Onset    Diabetes Father      No Known Problems Mother         Social History     Tobacco Use    Smoking status: Never     Passive exposure: Never    Smokeless tobacco: Never   Substance Use Topics    Alcohol use: Never    Drug use: Not Currently         There were no vitals filed for this visit.     Physical Exam:  LMP 04/04/2024     Physical Exam  Constitutional:       Appearance: Normal appearance.   HENT:      Head: Normocephalic and atraumatic.      Nose: Nose normal.      Mouth/Throat:      Mouth: Mucous membranes are moist.      Pharynx: Oropharynx is clear.   Eyes:      Conjunctiva/sclera: Conjunctivae normal.   Cardiovascular:      Rate and Rhythm: Normal rate and regular rhythm.      Heart sounds: Normal heart sounds.   Pulmonary:      Effort: Pulmonary effort is normal.      Breath sounds: Normal breath sounds.   Abdominal:      General: Abdomen is flat. Bowel sounds are normal.      Palpations: Abdomen is soft.   Musculoskeletal:          General: Normal range of motion.      Cervical back: Normal range of motion and neck supple.   Skin:     General: Skin is warm and dry.   Neurological:      General: No focal deficit present.      Mental Status: She is alert and oriented to person, place, and time. Mental status is at baseline.   Psychiatric:         Mood and Affect: Mood normal.          Labs:  Lab Results   Component Value Date    WBC 7.16 11/08/2024    RBC 4.56 11/08/2024    HGB 12.1 11/08/2024    HCT 38.8 11/08/2024    MCV 85 11/08/2024    MCH 26.5 (L) 11/08/2024    MCHC 31.2 (L) 11/08/2024    RDW 20.1 (H) 11/08/2024     11/08/2024    MPV 11.9 11/08/2024    GRAN 4.4 11/08/2024    GRAN 61.5 11/08/2024    LYMPH 2.0 11/08/2024    LYMPH 27.2 11/08/2024    MONO 0.7 11/08/2024    MONO 9.1 11/08/2024    EOS 0.1 11/08/2024    BASO 0.02 11/08/2024    EOSINOPHIL 1.5 11/08/2024    BASOPHIL 0.3 11/08/2024     Sodium   Date Value Ref Range Status   11/02/2024 137 136 - 145 mmol/L Final     Potassium   Date Value Ref Range Status   11/02/2024 4.1 3.5 - 5.1 mmol/L Final     Chloride   Date Value Ref Range Status   11/02/2024 110 95 - 110 mmol/L Final     CO2   Date Value Ref Range Status   11/02/2024 16 (L) 23 - 29 mmol/L Final     Glucose   Date Value Ref Range Status   11/02/2024 104 70 - 110 mg/dL Final     BUN   Date Value Ref Range Status   11/02/2024 8 6 - 20 mg/dL Final     Creatinine   Date Value Ref Range Status   11/02/2024 0.7 0.5 - 1.4 mg/dL Final     Calcium   Date Value Ref Range Status   11/02/2024 9.9 8.7 - 10.5 mg/dL Final     Total Protein   Date Value Ref Range Status   11/02/2024 7.2 6.0 - 8.4 g/dL Final     Albumin   Date Value Ref Range Status   11/02/2024 3.1 (L) 3.5 - 5.2 g/dL Final     Total Bilirubin   Date Value Ref Range Status   11/02/2024 0.2 0.1 - 1.0 mg/dL Final     Comment:     For infants and newborns, interpretation of results should be based  on gestational age, weight and in agreement with  clinical  observations.    Premature Infant recommended reference ranges:  Up to 24 hours.............<8.0 mg/dL  Up to 48 hours............<12.0 mg/dL  3-5 days..................<15.0 mg/dL  6-29 days.................<15.0 mg/dL       Alkaline Phosphatase   Date Value Ref Range Status   11/02/2024 437 (H) 40 - 150 U/L Final     AST   Date Value Ref Range Status   11/02/2024 15 10 - 40 U/L Final     ALT   Date Value Ref Range Status   11/02/2024 9 (L) 10 - 44 U/L Final     Anion Gap   Date Value Ref Range Status   11/02/2024 11 8 - 16 mmol/L Final     eGFR if    Date Value Ref Range Status   08/11/2020 SEE COMMENT >60 mL/min/1.73 m^2 Final     eGFR if non    Date Value Ref Range Status   08/11/2020 SEE COMMENT >60 mL/min/1.73 m^2 Final     Comment:     Calculation used to obtain the estimated glomerular filtration  rate (eGFR) is the CKD-EPI equation.   Test not performed.  GFR calculation is only valid for patients   18 and older.         A/P:    Iron deficiency anemia  -due to pregnancy  -continues to improve with iron gummies   -patient was having menorrhagia prior to her pregnancy so I would like for her to continue the iron gummies even after she has delivered.  She will start taking every other day once she has delivered now repeat blood work 6 months from now.      Aurash Khoobehi, MD  Hematology and Oncology

## 2024-11-13 ENCOUNTER — ROUTINE PRENATAL (OUTPATIENT)
Dept: OBSTETRICS AND GYNECOLOGY | Facility: CLINIC | Age: 19
End: 2024-11-13
Payer: MEDICAID

## 2024-11-13 ENCOUNTER — PROCEDURE VISIT (OUTPATIENT)
Dept: OBSTETRICS AND GYNECOLOGY | Facility: CLINIC | Age: 19
End: 2024-11-13
Payer: MEDICAID

## 2024-11-13 ENCOUNTER — HOSPITAL ENCOUNTER (INPATIENT)
Facility: HOSPITAL | Age: 19
LOS: 18 days | Discharge: HOME | End: 2024-12-01
Attending: OBSTETRICS & GYNECOLOGY | Admitting: STUDENT IN AN ORGANIZED HEALTH CARE EDUCATION/TRAINING PROGRAM
Payer: MEDICAID

## 2024-11-13 ENCOUNTER — PATIENT MESSAGE (OUTPATIENT)
Dept: OTHER | Facility: OTHER | Age: 19
End: 2024-11-13
Payer: MEDICAID

## 2024-11-13 VITALS
BODY MASS INDEX: 32.13 KG/M2 | WEIGHT: 199.06 LBS | DIASTOLIC BLOOD PRESSURE: 100 MMHG | SYSTOLIC BLOOD PRESSURE: 150 MMHG

## 2024-11-13 DIAGNOSIS — Z34.03 ENCOUNTER FOR SUPERVISION OF NORMAL FIRST PREGNANCY IN THIRD TRIMESTER: Primary | ICD-10-CM

## 2024-11-13 DIAGNOSIS — O99.019 ANTEPARTUM ANEMIA: ICD-10-CM

## 2024-11-13 DIAGNOSIS — O13.3 GESTATIONAL HYPERTENSION, THIRD TRIMESTER: ICD-10-CM

## 2024-11-13 DIAGNOSIS — O14.13 SEVERE PRE-ECLAMPSIA IN THIRD TRIMESTER: ICD-10-CM

## 2024-11-13 DIAGNOSIS — O36.5930 POOR FETAL GROWTH AFFECTING MANAGEMENT OF MOTHER IN THIRD TRIMESTER, SINGLE OR UNSPECIFIED FETUS: ICD-10-CM

## 2024-11-13 DIAGNOSIS — R00.0 TACHYCARDIA: ICD-10-CM

## 2024-11-13 DIAGNOSIS — O99.810 ABNORMAL GLUCOSE AFFECTING PREGNANCY: ICD-10-CM

## 2024-11-13 PROBLEM — Z34.93 ENCOUNTER FOR SUPERVISION OF NORMAL PREGNANCY IN THIRD TRIMESTER: Status: ACTIVE | Noted: 2024-05-28

## 2024-11-13 PROBLEM — N94.6 DYSMENORRHEA: Status: RESOLVED | Noted: 2024-04-15 | Resolved: 2024-11-13

## 2024-11-13 LAB
ALBUMIN SERPL BCP-MCNC: 2.9 G/DL (ref 3.5–5.2)
ALP SERPL-CCNC: 445 U/L (ref 40–150)
ALT SERPL W/O P-5'-P-CCNC: 10 U/L (ref 10–44)
ANION GAP SERPL CALC-SCNC: 9 MMOL/L (ref 8–16)
AST SERPL-CCNC: 14 U/L (ref 10–40)
BASOPHILS # BLD AUTO: 0.02 K/UL (ref 0–0.2)
BASOPHILS NFR BLD: 0.1 % (ref 0–1.9)
BILIRUB SERPL-MCNC: 0.2 MG/DL (ref 0.1–1)
BUN SERPL-MCNC: 7 MG/DL (ref 6–20)
CALCIUM SERPL-MCNC: 9.1 MG/DL (ref 8.7–10.5)
CHLORIDE SERPL-SCNC: 111 MMOL/L (ref 95–110)
CO2 SERPL-SCNC: 18 MMOL/L (ref 23–29)
CREAT SERPL-MCNC: 0.7 MG/DL (ref 0.5–1.4)
CREAT UR-MCNC: 88.9 MG/DL (ref 15–325)
DIFFERENTIAL METHOD BLD: ABNORMAL
EOSINOPHIL # BLD AUTO: 0.1 K/UL (ref 0–0.5)
EOSINOPHIL NFR BLD: 0.6 % (ref 0–8)
ERYTHROCYTE [DISTWIDTH] IN BLOOD BY AUTOMATED COUNT: 19.3 % (ref 11.5–14.5)
EST. GFR  (NO RACE VARIABLE): >60 ML/MIN/1.73 M^2
GLUCOSE SERPL-MCNC: 83 MG/DL (ref 70–110)
HCT VFR BLD AUTO: 36.2 % (ref 37–48.5)
HGB BLD-MCNC: 11.8 G/DL (ref 12–16)
IMM GRANULOCYTES # BLD AUTO: 0.3 K/UL (ref 0–0.04)
IMM GRANULOCYTES NFR BLD AUTO: 2.2 % (ref 0–0.5)
LYMPHOCYTES # BLD AUTO: 1.8 K/UL (ref 1–4.8)
LYMPHOCYTES NFR BLD: 13.4 % (ref 18–48)
MCH RBC QN AUTO: 26.5 PG (ref 27–31)
MCHC RBC AUTO-ENTMCNC: 32.6 G/DL (ref 32–36)
MCV RBC AUTO: 81 FL (ref 82–98)
MONOCYTES # BLD AUTO: 1.2 K/UL (ref 0.3–1)
MONOCYTES NFR BLD: 8.9 % (ref 4–15)
NEUTROPHILS # BLD AUTO: 10 K/UL (ref 1.8–7.7)
NEUTROPHILS NFR BLD: 74.8 % (ref 38–73)
NRBC BLD-RTO: 0 /100 WBC
PLATELET # BLD AUTO: 183 K/UL (ref 150–450)
PMV BLD AUTO: 11.3 FL (ref 9.2–12.9)
POTASSIUM SERPL-SCNC: 3.5 MMOL/L (ref 3.5–5.1)
PROT SERPL-MCNC: 6.6 G/DL (ref 6–8.4)
PROT UR-MCNC: 47 MG/DL (ref 0–15)
PROT/CREAT UR: 0.53 MG/G{CREAT} (ref 0–0.2)
RBC # BLD AUTO: 4.46 M/UL (ref 4–5.4)
SODIUM SERPL-SCNC: 138 MMOL/L (ref 136–145)
WBC # BLD AUTO: 13.39 K/UL (ref 3.9–12.7)

## 2024-11-13 PROCEDURE — 76816 OB US FOLLOW-UP PER FETUS: CPT | Mod: 26,S$PBB,, | Performed by: OBSTETRICS & GYNECOLOGY

## 2024-11-13 PROCEDURE — 99999 PR PBB SHADOW E&M-EST. PATIENT-LVL II: CPT | Mod: PBBFAC,,, | Performed by: ADVANCED PRACTICE MIDWIFE

## 2024-11-13 PROCEDURE — 85025 COMPLETE CBC W/AUTO DIFF WBC: CPT | Performed by: OBSTETRICS & GYNECOLOGY

## 2024-11-13 PROCEDURE — 76820 UMBILICAL ARTERY ECHO: CPT | Mod: 26,S$PBB,, | Performed by: OBSTETRICS & GYNECOLOGY

## 2024-11-13 PROCEDURE — 76819 FETAL BIOPHYS PROFIL W/O NST: CPT | Mod: PBBFAC | Performed by: OBSTETRICS & GYNECOLOGY

## 2024-11-13 PROCEDURE — 96372 THER/PROPH/DIAG INJ SC/IM: CPT | Performed by: OBSTETRICS & GYNECOLOGY

## 2024-11-13 PROCEDURE — 99223 1ST HOSP IP/OBS HIGH 75: CPT | Mod: ,,, | Performed by: OBSTETRICS & GYNECOLOGY

## 2024-11-13 PROCEDURE — 82570 ASSAY OF URINE CREATININE: CPT | Performed by: OBSTETRICS & GYNECOLOGY

## 2024-11-13 PROCEDURE — 80053 COMPREHEN METABOLIC PANEL: CPT | Performed by: OBSTETRICS & GYNECOLOGY

## 2024-11-13 PROCEDURE — 63600175 PHARM REV CODE 636 W HCPCS: Performed by: OBSTETRICS & GYNECOLOGY

## 2024-11-13 PROCEDURE — 99212 OFFICE O/P EST SF 10 MIN: CPT | Mod: PBBFAC,TH | Performed by: ADVANCED PRACTICE MIDWIFE

## 2024-11-13 PROCEDURE — 59025 FETAL NON-STRESS TEST: CPT

## 2024-11-13 PROCEDURE — 99211 OFF/OP EST MAY X REQ PHY/QHP: CPT | Mod: 27,25

## 2024-11-13 PROCEDURE — 11000001 HC ACUTE MED/SURG PRIVATE ROOM

## 2024-11-13 PROCEDURE — G0378 HOSPITAL OBSERVATION PER HR: HCPCS

## 2024-11-13 RX ORDER — HYDRALAZINE HYDROCHLORIDE 20 MG/ML
10 INJECTION INTRAMUSCULAR; INTRAVENOUS ONCE AS NEEDED
Status: DISCONTINUED | OUTPATIENT
Start: 2024-11-13 | End: 2024-11-15

## 2024-11-13 RX ORDER — LABETALOL HYDROCHLORIDE 5 MG/ML
80 INJECTION, SOLUTION INTRAVENOUS ONCE AS NEEDED
Status: DISCONTINUED | OUTPATIENT
Start: 2024-11-13 | End: 2024-11-15

## 2024-11-13 RX ORDER — BETAMETHASONE SODIUM PHOSPHATE AND BETAMETHASONE ACETATE 3; 3 MG/ML; MG/ML
12 INJECTION, SUSPENSION INTRA-ARTICULAR; INTRALESIONAL; INTRAMUSCULAR; SOFT TISSUE
Status: COMPLETED | OUTPATIENT
Start: 2024-11-13 | End: 2024-11-14

## 2024-11-13 RX ORDER — LABETALOL HYDROCHLORIDE 5 MG/ML
40 INJECTION, SOLUTION INTRAVENOUS ONCE AS NEEDED
Status: DISCONTINUED | OUTPATIENT
Start: 2024-11-13 | End: 2024-11-15

## 2024-11-13 RX ORDER — LABETALOL HYDROCHLORIDE 5 MG/ML
20 INJECTION, SOLUTION INTRAVENOUS ONCE AS NEEDED
Status: DISCONTINUED | OUTPATIENT
Start: 2024-11-13 | End: 2024-11-15

## 2024-11-13 RX ADMIN — BETAMETHASONE SODIUM PHOSPHATE AND BETAMETHASONE ACETATE 12 MG: 3; 3 INJECTION, SUSPENSION INTRA-ARTICULAR; INTRALESIONAL; INTRAMUSCULAR at 06:11

## 2024-11-13 NOTE — H&P
[facility] - Labor & Delivery  Obstetrics  History & Physical    Patient Name: Maria De Jesus Payne  MRN: [id number]  Admission Date: 2024  Primary Care Provider: Abena, Primary Doctor    Subjective:     Principal Problem:Gestational hypertension, third trimester    History of Present Illness:  18 yo G1 with IUP at 32w0d.  Current pregnancy complicated by GHTN.  Pt was sent from clinic secondary to elevated BP in clinic today.  Pt denies headaches or vision changes today.  Has noted some swelling.  Otherwise no complaints.    Obstetric HPI:  Patient reports None contractions, active fetal movement, No vaginal bleeding , No loss of fluid     This pregnancy has been complicated by GHTN    OB History    Para Term  AB Living   1 0 0 0 0 0   SAB IAB Ectopic Multiple Live Births   0 0 0 0 0      # Outcome Date GA Lbr Alex/2nd Weight Sex Type Anes PTL Lv   1 Current              Past Medical History:   Diagnosis Date    ASCUS of cervix with negative high risk HPV 04/15/2024    Repeat pap in 6 months, 10/22/24    Irregular menstrual cycle     Labial cyst     lanced     History reviewed. No pertinent surgical history.    PTA Medications   Medication Sig    metoclopramide HCl (REGLAN) 10 MG tablet Take 1 tablet (10 mg total) by mouth every 6 (six) hours as needed (headache or nausea). (Patient not taking: Reported on 2024)       Review of patient's allergies indicates:  No Known Allergies     Family History       Problem Relation (Age of Onset)    Diabetes Father    No Known Problems Mother          Tobacco Use    Smoking status: Never     Passive exposure: Never    Smokeless tobacco: Never   Substance and Sexual Activity    Alcohol use: Never    Drug use: Not Currently    Sexual activity: Yes     Partners: Male     Review of Systems   Constitutional:  Negative for activity change, appetite change, chills, fatigue, fever and unexpected weight change.   Respiratory:  Negative for shortness of breath.     Cardiovascular:  Positive for leg swelling. Negative for chest pain and palpitations.   Gastrointestinal:  Negative for abdominal pain, bloating, blood in stool, constipation, diarrhea, nausea and vomiting.   Genitourinary:  Negative for dysmenorrhea, dyspareunia, dysuria, flank pain, frequency, genital sores, hematuria, pelvic pain, urgency, vaginal bleeding, vaginal discharge, vaginal pain, urinary incontinence, postcoital bleeding, vaginal dryness and vaginal odor.   Musculoskeletal:  Negative for back pain.   Neurological:  Negative for syncope and headaches.      Objective:     Vital Signs (Most Recent):    Vital Signs (24h Range):  BP: (150)/(100) 150/100        There is no height or weight on file to calculate BMI.    FHT: Cat 1 (reassuring)  TOCO:      Physical Exam:   Constitutional: She is oriented to person, place, and time. She appears well-developed and well-nourished. No distress.    HENT:   Head: Normocephalic and atraumatic.    Eyes: Pupils are equal, round, and reactive to light. EOM are normal.     Cardiovascular:  Normal rate and regular rhythm.             Pulmonary/Chest: Effort normal.        Abdominal: Soft.             Musculoskeletal: Normal range of motion and moves all extremeties. Edema present. No tenderness.       Neurological: She is alert and oriented to person, place, and time.    Skin: Skin is warm and dry.    Psychiatric: She has a normal mood and affect. Her behavior is normal. Thought content normal.          Significant Labs:  Lab Results   Component Value Date    GROUPTRH O POS 2024    HEPBSAG Non-reactive 2024       I have personallly reviewed all pertinent lab results from the last 24 hours.  Assessment/Plan:     19 y.o. female  at 32w0d for:    * Gestational hypertension, third trimester  Pt with elevated BP in clinic today and with evidence of IUGR on US.  Will place in observation for close BP monitoring, labs, and will start BMZ.  Pt counseled on  management plan and admission goals.    Poor fetal growth affecting management of mother in third trimester  US today 1480 g (10%), AC 3%, BPP 8/8, MVP 7.6, S:D nL, cephalic        Jasson Pineda MD  Obstetrics  [facility] - Labor & Delivery

## 2024-11-13 NOTE — ASSESSMENT & PLAN NOTE
Pt with elevated BP in clinic today and with evidence of IUGR on US.  Will place in observation for close BP monitoring, labs, and will start BMZ.  Pt counseled on management plan and admission goals.

## 2024-11-13 NOTE — SUBJECTIVE & OBJECTIVE
Obstetric HPI:  Patient reports None contractions, active fetal movement, No vaginal bleeding , No loss of fluid     This pregnancy has been complicated by GHTN    OB History    Para Term  AB Living   1 0 0 0 0 0   SAB IAB Ectopic Multiple Live Births   0 0 0 0 0      # Outcome Date GA Lbr Alex/2nd Weight Sex Type Anes PTL Lv   1 Current              Past Medical History:   Diagnosis Date    ASCUS of cervix with negative high risk HPV 04/15/2024    Repeat pap in 6 months, 10/22/24    Irregular menstrual cycle     Labial cyst     lanced     History reviewed. No pertinent surgical history.    PTA Medications   Medication Sig    metoclopramide HCl (REGLAN) 10 MG tablet Take 1 tablet (10 mg total) by mouth every 6 (six) hours as needed (headache or nausea). (Patient not taking: Reported on 2024)       Review of patient's allergies indicates:  No Known Allergies     Family History       Problem Relation (Age of Onset)    Diabetes Father    No Known Problems Mother          Tobacco Use    Smoking status: Never     Passive exposure: Never    Smokeless tobacco: Never   Substance and Sexual Activity    Alcohol use: Never    Drug use: Not Currently    Sexual activity: Yes     Partners: Male     Review of Systems   Constitutional:  Negative for activity change, appetite change, chills, fatigue, fever and unexpected weight change.   Respiratory:  Negative for shortness of breath.    Cardiovascular:  Positive for leg swelling. Negative for chest pain and palpitations.   Gastrointestinal:  Negative for abdominal pain, bloating, blood in stool, constipation, diarrhea, nausea and vomiting.   Genitourinary:  Negative for dysmenorrhea, dyspareunia, dysuria, flank pain, frequency, genital sores, hematuria, pelvic pain, urgency, vaginal bleeding, vaginal discharge, vaginal pain, urinary incontinence, postcoital bleeding, vaginal dryness and vaginal odor.   Musculoskeletal:  Negative for back pain.   Neurological:   Negative for syncope and headaches.      Objective:     Vital Signs (Most Recent):    Vital Signs (24h Range):  BP: (150)/(100) 150/100        There is no height or weight on file to calculate BMI.    FHT: Cat 1 (reassuring)  TOCO:      Physical Exam:   Constitutional: She is oriented to person, place, and time. She appears well-developed and well-nourished. No distress.    HENT:   Head: Normocephalic and atraumatic.    Eyes: Pupils are equal, round, and reactive to light. EOM are normal.     Cardiovascular:  Normal rate and regular rhythm.             Pulmonary/Chest: Effort normal.        Abdominal: Soft.             Musculoskeletal: Normal range of motion and moves all extremeties. Edema present. No tenderness.       Neurological: She is alert and oriented to person, place, and time.    Skin: Skin is warm and dry.    Psychiatric: She has a normal mood and affect. Her behavior is normal. Thought content normal.          Significant Labs:  Lab Results   Component Value Date    GROUPTRH O POS 05/06/2024    HEPBSAG Non-reactive 05/06/2024       I have personallly reviewed all pertinent lab results from the last 24 hours.

## 2024-11-13 NOTE — HPI
20 yo G1 with IUP at 32w0d.  Current pregnancy complicated by GHTN.  Pt was sent from clinic secondary to elevated BP in clinic.  Pt denies headaches or vision changes on admission.  Has noted some swelling.  Otherwise no complaints.

## 2024-11-13 NOTE — PROGRESS NOTES
19 y.o. female  at 32w0d   Reports + FM, denies VB, LOF or CTX  Doing well without concerns. BP elevated today. Has had some visual disturbances and some headaches over the last several days. Checks BP at home and diastolic number has been elevated.   Will send to L&D for evaluation with these symptoms and new dx of IUGR.   Will set up for twice weekly visits in office.     TW lbs 8 pounds since last visit.    Tdap does want, but sent to hospital today for monitoring/pre-e work up.     BPP today , cephalic presentation, MVP 7.6, EFW 10% (3#4oz), AC 3%. SD ratios 26%    1. Encounter for supervision of normal first pregnancy in third trimester    2. Abnormal glucose affecting pregnancy  Overview:  1 hour: 142  Passed 3hr GTT      3. Antepartum anemia  Overview:  Seeing hemonc. Levels improving with oral iron.   Repeat CBC in 3T 11.5/35.8      4. Gestational hypertension, third trimester  Overview:  BP normal today, also monitoring on connected moms at home and all readings normal  P/C ratio 0.10  Will continue to monitor closely    Orders:  -     US OB/GYN Procedure ([facility]); Standing    5. Poor fetal growth affecting management of mother in third trimester, single or unspecified fetus  Overview:  US on : EFW 3#4oz (10%), AC 3%.   Start twice weekly testing    Orders:  -     US OB/GYN Procedure ([facility]); Standing         Reviewed warning signs, normal FKCs,  labor precautions and how/when to call.  RTC x 2 wks, call or present sooner prn.

## 2024-11-14 ENCOUNTER — TELEPHONE (OUTPATIENT)
Dept: OBSTETRICS AND GYNECOLOGY | Facility: CLINIC | Age: 19
End: 2024-11-14
Payer: MEDICAID

## 2024-11-14 PROBLEM — O14.93 PRE-ECLAMPSIA IN THIRD TRIMESTER: Status: ACTIVE | Noted: 2024-10-17

## 2024-11-14 PROCEDURE — 11000001 HC ACUTE MED/SURG PRIVATE ROOM

## 2024-11-14 PROCEDURE — 63600175 PHARM REV CODE 636 W HCPCS: Performed by: OBSTETRICS & GYNECOLOGY

## 2024-11-14 PROCEDURE — 96372 THER/PROPH/DIAG INJ SC/IM: CPT | Performed by: OBSTETRICS & GYNECOLOGY

## 2024-11-14 PROCEDURE — 72100002 HC LABOR CARE, 1ST 8 HOURS

## 2024-11-14 PROCEDURE — 99232 SBSQ HOSP IP/OBS MODERATE 35: CPT | Mod: ,,, | Performed by: OBSTETRICS & GYNECOLOGY

## 2024-11-14 PROCEDURE — G0378 HOSPITAL OBSERVATION PER HR: HCPCS

## 2024-11-14 PROCEDURE — 72100003 HC LABOR CARE, EA. ADDL. 8 HRS

## 2024-11-14 RX ADMIN — BETAMETHASONE SODIUM PHOSPHATE AND BETAMETHASONE ACETATE 12 MG: 3; 3 INJECTION, SUSPENSION INTRA-ARTICULAR; INTRALESIONAL; INTRAMUSCULAR at 06:11

## 2024-11-14 NOTE — PROGRESS NOTES
[facility] - Labor & Delivery  Obstetrics  Antepartum Progress Note    Patient Name: Maria De Jesus Payne  MRN: [id number]  Admission Date: 11/13/2024  Hospital Length of Stay: 0 days  Attending Physician: Jasson Pineda MD  Primary Care Provider: Abena, Primary Doctor    Subjective:     Principal Problem:Pre-eclampsia in third trimester    HPI:  18 yo G1 with IUP at 32w0d.  Current pregnancy complicated by GHTN.  Pt was sent from clinic secondary to elevated BP in clinic today.  Pt denies headaches or vision changes today.  Has noted some swelling.  Otherwise no complaints.    Hospital Course:  Pt admitted to OB service with preeclampsia.  Urine P/C ratio 0.53.  Labs wnl.  Ultrasound 11/13/24 consistent with IUGR (EFW 1480 g at the 10th%, AC 3rd%), UA doppler wnl  BMZ on 11/13 and 11/14 11/4/24: HD#2, asymptomatic.  BP wnl.  Pt to complete BMZ series today    Obstetric HPI:  Patient reports None contractions, active fetal movement, absent vaginal bleeding , absent loss of fluid   Denies any HA, change in vision, CP, or SOB.  No RUQ or epigastric pain     Objective:     Vital Signs (Most Recent):  Temp: 98.8 °F (37.1 °C) (11/13/24 1850)  Pulse: 83 (11/14/24 1215)  Resp: 16 (11/13/24 1850)  BP: 123/70 (11/14/24 1215)  SpO2: 100 % (11/14/24 1215) Vital Signs (24h Range):  Temp:  [98.8 °F (37.1 °C)] 98.8 °F (37.1 °C)  Pulse:  [] 83  Resp:  [16] 16  SpO2:  [90 %-100 %] 100 %  BP: (108-166)/() 123/70        There is no height or weight on file to calculate BMI.    FHT: Cat 1 (reassuring)  TOCO:  Q 0 minutes    No intake or output data in the 24 hours ending 11/14/24 1446         Significant Labs:  Recent Lab Results         11/13/24  1802   11/13/24  1757        Albumin 2.9                  ALT 10         Anion Gap 9         AST 14         Baso # 0.02         Basophil % 0.1         BILIRUBIN TOTAL 0.2  Comment: For infants and newborns, interpretation of results should be based  on gestational age, weight  and in agreement with clinical  observations.    Premature Infant recommended reference ranges:  Up to 24 hours.............<8.0 mg/dL  Up to 48 hours............<12.0 mg/dL  3-5 days..................<15.0 mg/dL  6-29 days.................<15.0 mg/dL           BUN 7         Calcium 9.1         Chloride 111         CO2 18         Creatinine 0.7         Urine Creatinine   88.9       Differential Method Automated         eGFR >60         Eos # 0.1         Eos % 0.6         Glucose 83         Gran # (ANC) 10.0         Gran % 74.8         Hematocrit 36.2         Hemoglobin 11.8         Immature Grans (Abs) 0.30  Comment: Mild elevation in immature granulocytes is non specific and   can be seen in a variety of conditions including stress response,   acute inflammation, trauma and pregnancy. Correlation with other   laboratory and clinical findings is essential.           Immature Granulocytes 2.2         Lymph # 1.8         Lymph % 13.4         MCH 26.5         MCHC 32.6         MCV 81         Mono # 1.2         Mono % 8.9         MPV 11.3         nRBC 0         Platelet Count 183         Potassium 3.5         Urine Protein/Creatinine Ratio   0.53       PROTEIN TOTAL 6.6         Urine Protein   47       RBC 4.46         RDW 19.3         Sodium 138         WBC 13.39                 Physical Exam:   Constitutional: She is oriented to person, place, and time. She appears well-developed and well-nourished. No distress.    HENT:   Head: Normocephalic and atraumatic.    Eyes:   Pt with increased facial edema    Neck: No thyromegaly present.     Pulmonary/Chest: Effort normal.        Abdominal: Soft. She exhibits no distension and no mass. There is no abdominal tenderness. There is no rebound and no guarding.   Uterus gravid, soft, and non-tender             Musculoskeletal: Edema (1+ BL LE edema) present.       Neurological: She is alert and oriented to person, place, and time. She displays normal reflexes (DTR's 2+ BL; no  clonus).    Skin: No rash noted.    Psychiatric: She has a normal mood and affect. Her behavior is normal. Judgment and thought content normal.       Review of Systems  Assessment/Plan:     19 y.o. female  at 32w1d for:    * Pre-eclampsia in third trimester  -Urine P/C ratio now 0.53  -Had mild elevated BP with one severe range BP on admission, but BP currently normal to mildly elevated  -Complete BMZ series tonight  -In the setting of preeclampsia now with IUGR, discussed plan for close inpatient surveillance with weekly labs (q Monday), NST bid, BPP q Monday/Thursday, and UA Dopplers q Monday      Poor fetal growth affecting management of mother in third trimester  US today 1480 g (10%), AC 3%, BPP 8/8, MVP 7.6, S:D nL, cephalic  1hr NST bid  BPP 2x/week with UA dopplers weekly          Ashley Richmond MD  Obstetrics  [facility] - [facility]

## 2024-11-14 NOTE — ASSESSMENT & PLAN NOTE
-Urine P/C ratio now 0.53  -Had mild elevated BP with one severe range BP on admission, but BP currently normal to mildly elevated  -Complete BMZ series tonight  -In the setting of preeclampsia now with IUGR, discussed plan for close inpatient surveillance with weekly labs (q Monday), NST bid, BPP q Monday/Thursday, and UA Dopplers q Monday

## 2024-11-14 NOTE — SUBJECTIVE & OBJECTIVE
Obstetric HPI:  Patient reports None contractions, active fetal movement, absent vaginal bleeding , absent loss of fluid   Denies any HA, change in vision, CP, or SOB.  No RUQ or epigastric pain     Objective:     Vital Signs (Most Recent):  Temp: 98.8 °F (37.1 °C) (11/13/24 1850)  Pulse: 83 (11/14/24 1215)  Resp: 16 (11/13/24 1850)  BP: 123/70 (11/14/24 1215)  SpO2: 100 % (11/14/24 1215) Vital Signs (24h Range):  Temp:  [98.8 °F (37.1 °C)] 98.8 °F (37.1 °C)  Pulse:  [] 83  Resp:  [16] 16  SpO2:  [90 %-100 %] 100 %  BP: (108-166)/() 123/70        There is no height or weight on file to calculate BMI.    FHT: Cat 1 (reassuring)  TOCO:  Q 0 minutes    No intake or output data in the 24 hours ending 11/14/24 1446         Significant Labs:  Recent Lab Results         11/13/24  1802   11/13/24  1757        Albumin 2.9                  ALT 10         Anion Gap 9         AST 14         Baso # 0.02         Basophil % 0.1         BILIRUBIN TOTAL 0.2  Comment: For infants and newborns, interpretation of results should be based  on gestational age, weight and in agreement with clinical  observations.    Premature Infant recommended reference ranges:  Up to 24 hours.............<8.0 mg/dL  Up to 48 hours............<12.0 mg/dL  3-5 days..................<15.0 mg/dL  6-29 days.................<15.0 mg/dL           BUN 7         Calcium 9.1         Chloride 111         CO2 18         Creatinine 0.7         Urine Creatinine   88.9       Differential Method Automated         eGFR >60         Eos # 0.1         Eos % 0.6         Glucose 83         Gran # (ANC) 10.0         Gran % 74.8         Hematocrit 36.2         Hemoglobin 11.8         Immature Grans (Abs) 0.30  Comment: Mild elevation in immature granulocytes is non specific and   can be seen in a variety of conditions including stress response,   acute inflammation, trauma and pregnancy. Correlation with other   laboratory and clinical findings is  essential.           Immature Granulocytes 2.2         Lymph # 1.8         Lymph % 13.4         MCH 26.5         MCHC 32.6         MCV 81         Mono # 1.2         Mono % 8.9         MPV 11.3         nRBC 0         Platelet Count 183         Potassium 3.5         Urine Protein/Creatinine Ratio   0.53       PROTEIN TOTAL 6.6         Urine Protein   47       RBC 4.46         RDW 19.3         Sodium 138         WBC 13.39                 Physical Exam:   Constitutional: She is oriented to person, place, and time. She appears well-developed and well-nourished. No distress.    HENT:   Head: Normocephalic and atraumatic.    Eyes:   Pt with increased facial edema    Neck: No thyromegaly present.     Pulmonary/Chest: Effort normal.        Abdominal: Soft. She exhibits no distension and no mass. There is no abdominal tenderness. There is no rebound and no guarding.   Uterus gravid, soft, and non-tender             Musculoskeletal: Edema (1+ BL LE edema) present.       Neurological: She is alert and oriented to person, place, and time. She displays normal reflexes (DTR's 2+ BL; no clonus).    Skin: No rash noted.    Psychiatric: She has a normal mood and affect. Her behavior is normal. Judgment and thought content normal.       Review of Systems

## 2024-11-14 NOTE — HOSPITAL COURSE
Pt admitted to OB service with preeclampsia.  Urine P/C ratio 0.53.  Labs wnl.  Ultrasound 11/13/24 consistent with IUGR (EFW 1480 g at the 10th%, AC 3rd%), UA doppler wnl  BMZ on 11/13 and 11/14 11/14/24: HD#2, asymptomatic.  BP wnl.  Pt to complete BMZ series today  11/15/24:  Blood pressures elevated to severe range.  Magnesium sulfate given.  11/16/24: Blood pressures normal to mild range today. Episode of shortness of breath, fatigue, diminished reflexes, mag stopped, Mag level 5.8, restarted magnesium at 1g/hr  11/17/24 - Procardia XL 30 mg BID initiated for blood pressure control.  Blood pressures stable.  Patient asymptomatic.  11/18/24: BP stable on current regimen. JAD 12.8. MVP 4.9 cm. Breech presentation. No evidence of previa. Anterior placenta. Biophysical profile score 8/8   11/19/24--tacchycardia, shortness of breath and chest pain, neg CXR, elevated d dimer, neg resp panel, neg CTA-p; small pleural effusion; chest pain improved with ativan;   11/21/24--BPP 8/8, breech, umbilical artery dopplers wnl  11/23/24--denies preE symtpoms, bp stable on procardia 30mg bid  11/24/24-denies preE stympoms, bp elevated on procardia 30bid, but no severe range pressures  11/25/24- Denies preE symptoms, continue procardia 30BID, BPP 8/8, vertex, dopplers normal, pre-eclampsia labs stable, no severe range blood pressures.;   11/26/24:Doing well today. Denies any s/s of pre-e. Continue procardia. Bedside US performed, vertex presentation. Cervix FT/Thick/hard. Will plan to start IOL to start with cytotec. Dicussed POC with MD and patient. All agreeable. GBS not tested, so will do PPX treatment with Pcn.   11/27/24 Continue with Cytotec PO Q 4 hr  11/28/24 0745-s/p cervidil overnight, VE 1/70/-2 CRB placed, start pitocin, hydrate for epidural, BP stable  11/29/24 PPD 1: routine PP care. Bps mildly elevated s/p 12 hours Mg+. Procardia dose increased 60 BID. Pt reports mild HA with significant facial and extremity  swelling , will continue to monitor  11/30/24 PPD2: routine PP orders.  BP stable on procardia 60mg BID. Will continue after discharge. MgSO4 d/c'd at 0300. Facial swelling and BLE swelling has resolved. Right arm appears to still have some non pitting edema.  Will continue to monitor. Discussed plans to additional night to monitor swelling and BP.  Will plan for D/C tomorrow with BP check in 3 days. Patient verbalized understanding and is agreeable with plan.   12/1/24: PPD3: Routine pp Orders. BP WNL. Pt stable for discharge. BP check in 3 days. Follow up with midwife in 4 weeks. Discharge instructions reviewed including PIH, PPD, PPH, and pain.

## 2024-11-14 NOTE — TELEPHONE ENCOUNTER
----- Message from Anyg Rushing sent at 11/14/2024  9:47 AM CST -----  Hey yall,   Starting next week this patient needs twice weekly ultrasounds with once weekly visits please. :)  Thanks

## 2024-11-14 NOTE — ASSESSMENT & PLAN NOTE
US today 1480 g (10%), AC 3%, BPP 8/8, MVP 7.6, S:D nL, cephalic  1hr NST bid  BPP 2x/week with UA dopplers weekly

## 2024-11-14 NOTE — TELEPHONE ENCOUNTER
Left message for patient to return call to [phone] in regards to scheduling twice weekly US and and appts     Laurita SCHMITZ LPN  OB/GYN

## 2024-11-15 PROBLEM — O14.13 SEVERE PRE-ECLAMPSIA IN THIRD TRIMESTER: Status: ACTIVE | Noted: 2024-10-17

## 2024-11-15 LAB
ALBUMIN SERPL BCP-MCNC: 3 G/DL (ref 3.5–5.2)
ALP SERPL-CCNC: 433 U/L (ref 40–150)
ALT SERPL W/O P-5'-P-CCNC: 14 U/L (ref 10–44)
ANION GAP SERPL CALC-SCNC: 13 MMOL/L (ref 8–16)
ANISOCYTOSIS BLD QL SMEAR: SLIGHT
AST SERPL-CCNC: 20 U/L (ref 10–40)
BASOPHILS NFR BLD: 0 % (ref 0–1.9)
BILIRUB SERPL-MCNC: 0.2 MG/DL (ref 0.1–1)
BUN SERPL-MCNC: 11 MG/DL (ref 6–20)
CALCIUM SERPL-MCNC: 9.5 MG/DL (ref 8.7–10.5)
CHLORIDE SERPL-SCNC: 110 MMOL/L (ref 95–110)
CO2 SERPL-SCNC: 16 MMOL/L (ref 23–29)
CREAT SERPL-MCNC: 0.7 MG/DL (ref 0.5–1.4)
DACRYOCYTES BLD QL SMEAR: ABNORMAL
DIFFERENTIAL METHOD BLD: ABNORMAL
EOSINOPHIL NFR BLD: 0 % (ref 0–8)
ERYTHROCYTE [DISTWIDTH] IN BLOOD BY AUTOMATED COUNT: 19.2 % (ref 11.5–14.5)
EST. GFR  (NO RACE VARIABLE): >60 ML/MIN/1.73 M^2
GLUCOSE SERPL-MCNC: 111 MG/DL (ref 70–110)
HCT VFR BLD AUTO: 39.1 % (ref 37–48.5)
HGB BLD-MCNC: 12.7 G/DL (ref 12–16)
IMM GRANULOCYTES # BLD AUTO: ABNORMAL K/UL (ref 0–0.04)
IMM GRANULOCYTES NFR BLD AUTO: ABNORMAL % (ref 0–0.5)
LYMPHOCYTES NFR BLD: 7 % (ref 18–48)
MCH RBC QN AUTO: 26.6 PG (ref 27–31)
MCHC RBC AUTO-ENTMCNC: 32.5 G/DL (ref 32–36)
MCV RBC AUTO: 82 FL (ref 82–98)
METAMYELOCYTES NFR BLD MANUAL: 2 %
MONOCYTES NFR BLD: 2 % (ref 4–15)
MYELOCYTES NFR BLD MANUAL: 4 %
NEUTROPHILS NFR BLD: 84 % (ref 38–73)
NEUTS BAND NFR BLD MANUAL: 1 %
NRBC BLD-RTO: 1 /100 WBC
PATH REV BLD -IMP: NORMAL
PLATELET # BLD AUTO: 223 K/UL (ref 150–450)
PLATELET BLD QL SMEAR: ABNORMAL
PMV BLD AUTO: 11.2 FL (ref 9.2–12.9)
POIKILOCYTOSIS BLD QL SMEAR: SLIGHT
POTASSIUM SERPL-SCNC: 4 MMOL/L (ref 3.5–5.1)
PROT SERPL-MCNC: 7.4 G/DL (ref 6–8.4)
RBC # BLD AUTO: 4.78 M/UL (ref 4–5.4)
SODIUM SERPL-SCNC: 139 MMOL/L (ref 136–145)
WBC # BLD AUTO: 24.76 K/UL (ref 3.9–12.7)

## 2024-11-15 PROCEDURE — 63600175 PHARM REV CODE 636 W HCPCS: Performed by: OBSTETRICS & GYNECOLOGY

## 2024-11-15 PROCEDURE — 85027 COMPLETE CBC AUTOMATED: CPT | Performed by: OBSTETRICS & GYNECOLOGY

## 2024-11-15 PROCEDURE — 25000003 PHARM REV CODE 250: Performed by: OBSTETRICS & GYNECOLOGY

## 2024-11-15 PROCEDURE — 85060 BLOOD SMEAR INTERPRETATION: CPT | Mod: ,,, | Performed by: STUDENT IN AN ORGANIZED HEALTH CARE EDUCATION/TRAINING PROGRAM

## 2024-11-15 PROCEDURE — G0378 HOSPITAL OBSERVATION PER HR: HCPCS

## 2024-11-15 PROCEDURE — 72100003 HC LABOR CARE, EA. ADDL. 8 HRS

## 2024-11-15 PROCEDURE — 80053 COMPREHEN METABOLIC PANEL: CPT | Performed by: OBSTETRICS & GYNECOLOGY

## 2024-11-15 PROCEDURE — 59025 FETAL NON-STRESS TEST: CPT

## 2024-11-15 PROCEDURE — 11000001 HC ACUTE MED/SURG PRIVATE ROOM

## 2024-11-15 PROCEDURE — 99232 SBSQ HOSP IP/OBS MODERATE 35: CPT | Mod: ,,, | Performed by: OBSTETRICS & GYNECOLOGY

## 2024-11-15 PROCEDURE — 85007 BL SMEAR W/DIFF WBC COUNT: CPT | Mod: NCS | Performed by: OBSTETRICS & GYNECOLOGY

## 2024-11-15 RX ORDER — DIPHENHYDRAMINE HYDROCHLORIDE 50 MG/ML
25 INJECTION INTRAMUSCULAR; INTRAVENOUS EVERY 4 HOURS PRN
Status: DISCONTINUED | OUTPATIENT
Start: 2024-11-15 | End: 2024-11-28 | Stop reason: SDUPTHER

## 2024-11-15 RX ORDER — MAGNESIUM SULFATE HEPTAHYDRATE 40 MG/ML
4 INJECTION, SOLUTION INTRAVENOUS ONCE
Status: COMPLETED | OUTPATIENT
Start: 2024-11-15 | End: 2024-11-15

## 2024-11-15 RX ORDER — MAGNESIUM SULFATE HEPTAHYDRATE 40 MG/ML
1 INJECTION, SOLUTION INTRAVENOUS CONTINUOUS
Status: DISCONTINUED | OUTPATIENT
Start: 2024-11-15 | End: 2024-11-16

## 2024-11-15 RX ORDER — NIFEDIPINE 10 MG/1
10 CAPSULE ORAL ONCE
Status: COMPLETED | OUTPATIENT
Start: 2024-11-15 | End: 2024-11-15

## 2024-11-15 RX ORDER — LABETALOL HYDROCHLORIDE 5 MG/ML
20 INJECTION, SOLUTION INTRAVENOUS ONCE AS NEEDED
Status: DISCONTINUED | OUTPATIENT
Start: 2024-11-15 | End: 2024-11-15

## 2024-11-15 RX ORDER — DIPHENHYDRAMINE HCL 25 MG
25 CAPSULE ORAL EVERY 4 HOURS PRN
Status: DISCONTINUED | OUTPATIENT
Start: 2024-11-15 | End: 2024-11-28 | Stop reason: SDUPTHER

## 2024-11-15 RX ORDER — CALCIUM GLUCONATE 98 MG/ML
1 INJECTION, SOLUTION INTRAVENOUS
Status: DISCONTINUED | OUTPATIENT
Start: 2024-11-15 | End: 2024-11-28

## 2024-11-15 RX ORDER — AMOXICILLIN 250 MG
1 CAPSULE ORAL NIGHTLY PRN
Status: DISCONTINUED | OUTPATIENT
Start: 2024-11-15 | End: 2024-11-28

## 2024-11-15 RX ORDER — NIFEDIPINE 10 MG/1
20 CAPSULE ORAL ONCE AS NEEDED
Status: DISCONTINUED | OUTPATIENT
Start: 2024-11-15 | End: 2024-11-15

## 2024-11-15 RX ORDER — LANOLIN ALCOHOL/MO/W.PET/CERES
1 CREAM (GRAM) TOPICAL 2 TIMES DAILY
Status: DISCONTINUED | OUTPATIENT
Start: 2024-11-16 | End: 2024-11-28

## 2024-11-15 RX ORDER — PRENATAL WITH FERROUS FUM AND FOLIC ACID 3080; 920; 120; 400; 22; 1.84; 3; 20; 10; 1; 12; 200; 27; 25; 2 [IU]/1; [IU]/1; MG/1; [IU]/1; MG/1; MG/1; MG/1; MG/1; MG/1; MG/1; UG/1; MG/1; MG/1; MG/1; MG/1
1 TABLET ORAL DAILY
Status: DISCONTINUED | OUTPATIENT
Start: 2024-11-16 | End: 2024-11-28 | Stop reason: SDUPTHER

## 2024-11-15 RX ORDER — ACETAMINOPHEN 500 MG
1000 TABLET ORAL EVERY 8 HOURS PRN
Status: DISCONTINUED | OUTPATIENT
Start: 2024-11-15 | End: 2024-11-28

## 2024-11-15 RX ORDER — SODIUM CHLORIDE 0.9 % (FLUSH) 0.9 %
10 SYRINGE (ML) INJECTION
Status: DISCONTINUED | OUTPATIENT
Start: 2024-11-15 | End: 2024-11-28

## 2024-11-15 RX ORDER — ONDANSETRON 8 MG/1
8 TABLET, ORALLY DISINTEGRATING ORAL EVERY 8 HOURS PRN
Status: DISCONTINUED | OUTPATIENT
Start: 2024-11-15 | End: 2024-11-28 | Stop reason: SDUPTHER

## 2024-11-15 RX ORDER — SIMETHICONE 80 MG
1 TABLET,CHEWABLE ORAL EVERY 6 HOURS PRN
Status: DISCONTINUED | OUTPATIENT
Start: 2024-11-15 | End: 2024-11-28 | Stop reason: SDUPTHER

## 2024-11-15 RX ORDER — SODIUM CHLORIDE, SODIUM LACTATE, POTASSIUM CHLORIDE, CALCIUM CHLORIDE 600; 310; 30; 20 MG/100ML; MG/100ML; MG/100ML; MG/100ML
INJECTION, SOLUTION INTRAVENOUS CONTINUOUS
Status: DISCONTINUED | OUTPATIENT
Start: 2024-11-15 | End: 2024-11-16

## 2024-11-15 RX ADMIN — NIFEDIPINE 10 MG: 10 CAPSULE ORAL at 04:11

## 2024-11-15 RX ADMIN — ACETAMINOPHEN 1000 MG: 500 TABLET ORAL at 01:11

## 2024-11-15 RX ADMIN — SODIUM CHLORIDE, POTASSIUM CHLORIDE, SODIUM LACTATE AND CALCIUM CHLORIDE: 600; 310; 30; 20 INJECTION, SOLUTION INTRAVENOUS at 05:11

## 2024-11-15 RX ADMIN — MAGNESIUM SULFATE HEPTAHYDRATE 4 G: 40 INJECTION, SOLUTION INTRAVENOUS at 05:11

## 2024-11-15 NOTE — SUBJECTIVE & OBJECTIVE
Obstetric HPI:  Patient reports no complaints.  No headache or visual changes.     Objective:     Vital Signs (Most Recent):  Temp: 98 °F (36.7 °C) (11/15/24 1523)  Pulse: 62 (11/15/24 1634)  Resp: 18 (11/15/24 1523)  BP: (!) 190/94 (11/15/24 1634)  SpO2: 100 % (11/15/24 1255) Vital Signs (24h Range):  Temp:  [98 °F (36.7 °C)-98.5 °F (36.9 °C)] 98 °F (36.7 °C)  Pulse:  [0-116] 62  Resp:  [18] 18  SpO2:  [82 %-100 %] 100 %  BP: (102-190)/(53-94) 190/94        There is no height or weight on file to calculate BMI.             Significant Labs:  Recent Lab Results       None        Labs pending    Physical Exam:   Constitutional: She is oriented to person, place, and time. No distress.    HENT:   Head: Normocephalic and atraumatic.      Cardiovascular:  Normal rate.             Pulmonary/Chest: Effort normal.        Abdominal: Soft. There is no abdominal tenderness.             Musculoskeletal: Edema present. No tenderness.       Neurological: She is alert and oriented to person, place, and time.     Psychiatric: She has a normal mood and affect.

## 2024-11-15 NOTE — ASSESSMENT & PLAN NOTE
-Urine P/C ratio 0.53  -BP currently normal to mildly elevated  -BMZ series complete  -In the setting of preeclampsia now with IUGR, discussed plan for close inpatient surveillance with weekly labs (q Monday), NST bid, BPP q Monday/Thursday, and UA Dopplers q Monday

## 2024-11-15 NOTE — SUBJECTIVE & OBJECTIVE
Obstetric HPI:  Patient reports no contractions, active fetal movement, absent vaginal bleeding , absent loss of fluid.  Intermittent mild headaches.  No other complaints.     Objective:     Vital Signs (Most Recent):  Temp: 98 °F (36.7 °C) (11/15/24 1203)  Pulse: 62 (11/15/24 1255)  Resp: 18 (11/15/24 1203)  BP: 133/76 (11/15/24 1203)  SpO2: 100 % (11/15/24 1255) Vital Signs (24h Range):  Temp:  [98 °F (36.7 °C)-98.5 °F (36.9 °C)] 98 °F (36.7 °C)  Pulse:  [0-117] 62  Resp:  [18] 18  SpO2:  [75 %-100 %] 100 %  BP: (102-151)/() 133/76        There is no height or weight on file to calculate BMI.    FHT: 140 Cat 1 (reassuring)  Occasional mild variable decelerations  TOCO:  Q 0 minutes    No intake or output data in the 24 hours ending 11/15/24 1424         Significant Labs:  Recent Lab Results       None            Physical Exam:   Constitutional: She is oriented to person, place, and time. No distress.    HENT:   Head: Normocephalic and atraumatic.      Cardiovascular:  Normal rate.             Pulmonary/Chest: Effort normal.        Abdominal: Soft. There is no abdominal tenderness.             Musculoskeletal: No tenderness or edema.       Neurological: She is alert and oriented to person, place, and time.     Psychiatric: She has a normal mood and affect.       Review of Systems

## 2024-11-15 NOTE — ASSESSMENT & PLAN NOTE
US on 11/13 - 1480 g (10%), AC 3%, BPP 8/8, MVP 7.6, S:D nL, cephalic  BPP 2x/week with UA dopplers weekly

## 2024-11-15 NOTE — PROGRESS NOTES
[facility]  Obstetrics  Antepartum Progress Note    Patient Name: Maria De Jesus Payne  MRN: [id number]  Admission Date: 11/13/2024  Hospital Length of Stay: 0 days  Attending Physician: Jasson Pineda MD  Primary Care Provider: Abena, Primary Doctor    Subjective:     Principal Problem:Severe pre-eclampsia in third trimester    HPI:  20 yo G1 with IUP at 32w0d.  Current pregnancy complicated by GHTN.  Pt was sent from clinic secondary to elevated BP in clinic today.  Pt denies headaches or vision changes today.  Has noted some swelling.  Otherwise no complaints.    Hospital Course:  Pt admitted to OB service with preeclampsia.  Urine P/C ratio 0.53.  Labs wnl.  Ultrasound 11/13/24 consistent with IUGR (EFW 1480 g at the 10th%, AC 3rd%), UA doppler wnl  BMZ on 11/13 and 11/14 11/14/24: HD#2, asymptomatic.  BP wnl.  Pt to complete BMZ series today  11/15/24:  Blood pressures elevated to severe range.  Magnesium sulfate given.    Obstetric HPI:  Patient reports no complaints.  No headache or visual changes.     Objective:     Vital Signs (Most Recent):  Temp: 98 °F (36.7 °C) (11/15/24 1523)  Pulse: 62 (11/15/24 1634)  Resp: 18 (11/15/24 1523)  BP: (!) 190/94 (11/15/24 1634)  SpO2: 100 % (11/15/24 1255) Vital Signs (24h Range):  Temp:  [98 °F (36.7 °C)-98.5 °F (36.9 °C)] 98 °F (36.7 °C)  Pulse:  [0-116] 62  Resp:  [18] 18  SpO2:  [82 %-100 %] 100 %  BP: (102-190)/(53-94) 190/94        There is no height or weight on file to calculate BMI.             Significant Labs:  Recent Lab Results       None        Labs pending    Physical Exam:   Constitutional: She is oriented to person, place, and time. No distress.    HENT:   Head: Normocephalic and atraumatic.      Cardiovascular:  Normal rate.             Pulmonary/Chest: Effort normal.        Abdominal: Soft. There is no abdominal tenderness.             Musculoskeletal: Edema present. No tenderness.       Neurological: She is alert and oriented to person,  place, and time.     Psychiatric: She has a normal mood and affect.       FHT's: 140's - category one tracing.  Moderate variability.  Accelerations present.      Assessment/Plan:     19 y.o. female  at 32w2d for:    * Severe pre-eclampsia in third trimester  -Urine P/C ratio 0.53  -BP now elevated to severe range. Will begin magnesium sulfate 4 gram load, then 2 grams/hr x 24 hours.  -BMZ series complete  -In the setting of preeclampsia with severe features and IUGR, will plan inpatient surveillance until delivery with weekly labs (q Monday), BPP q Monday/Thursday, and UA Dopplers q Monday.  Plan delivery at 34 weeks EGA or sooner as indicated for maternal or fetal indications.      Poor fetal growth affecting management of mother in third trimester  US on  - 1480 g (10%), AC 3%, BPP 8/8, MVP 7.6, S:D nL, cephalic  BPP 2x/week with UA dopplers weekly    Antepartum anemia  Continue oral iron supplement          Sunni Payne MD  Obstetrics  [facility]

## 2024-11-15 NOTE — ASSESSMENT & PLAN NOTE
-Urine P/C ratio 0.53  -BP now elevated to severe range. Will begin magnesium sulfate 4 gram load, then 2 grams/hr x 24 hours.  -BMZ series complete  -In the setting of preeclampsia with severe features and IUGR, will plan inpatient surveillance until delivery with weekly labs (q Monday), BPP q Monday/Thursday, and UA Dopplers q Monday.  Plan delivery at 34 weeks EGA or sooner as indicated for maternal or fetal indications.

## 2024-11-15 NOTE — PROGRESS NOTES
[facility] ([facility])  Obstetrics  Antepartum Progress Note    Patient Name: Maria De Jesus Payne  MRN: [id number]  Admission Date: 11/13/2024  Hospital Length of Stay: 0 days  Attending Physician: Jasson Pineda MD  Primary Care Provider: Abena, Primary Doctor    Subjective:     Principal Problem:Pre-eclampsia in third trimester    HPI:  18 yo G1 with IUP at 32w0d.  Current pregnancy complicated by GHTN.  Pt was sent from clinic secondary to elevated BP in clinic today.  Pt denies headaches or vision changes today.  Has noted some swelling.  Otherwise no complaints.    Hospital Course:  Pt admitted to OB service with preeclampsia.  Urine P/C ratio 0.53.  Labs wnl.  Ultrasound 11/13/24 consistent with IUGR (EFW 1480 g at the 10th%, AC 3rd%), UA doppler wnl  BMZ on 11/13 and 11/14 11/14/24: HD#2, asymptomatic.  BP wnl.  Pt to complete BMZ series today    Obstetric HPI:  Patient reports no contractions, active fetal movement, absent vaginal bleeding , absent loss of fluid.  Intermittent mild headaches.  No other complaints.     Objective:     Vital Signs (Most Recent):  Temp: 98 °F (36.7 °C) (11/15/24 1203)  Pulse: 62 (11/15/24 1255)  Resp: 18 (11/15/24 1203)  BP: 133/76 (11/15/24 1203)  SpO2: 100 % (11/15/24 1255) Vital Signs (24h Range):  Temp:  [98 °F (36.7 °C)-98.5 °F (36.9 °C)] 98 °F (36.7 °C)  Pulse:  [0-117] 62  Resp:  [18] 18  SpO2:  [75 %-100 %] 100 %  BP: (102-151)/() 133/76        There is no height or weight on file to calculate BMI.    FHT: 140 Cat 1 (reassuring)  Occasional mild variable decelerations  TOCO:  Q 0 minutes    No intake or output data in the 24 hours ending 11/15/24 1424         Significant Labs:  Recent Lab Results       None            Physical Exam:   Constitutional: She is oriented to person, place, and time. No distress.    HENT:   Head: Normocephalic and atraumatic.      Cardiovascular:  Normal rate.             Pulmonary/Chest: Effort normal.        Abdominal:  Soft. There is no abdominal tenderness.             Musculoskeletal: No tenderness or edema.       Neurological: She is alert and oriented to person, place, and time.     Psychiatric: She has a normal mood and affect.       Review of Systems  Assessment/Plan:     19 y.o. female  at 32w2d for:    * Pre-eclampsia in third trimester  -Urine P/C ratio 0.53  -BP currently normal to mildly elevated  -BMZ series complete  -In the setting of preeclampsia now with IUGR, discussed plan for close inpatient surveillance with weekly labs (q Monday), NST bid, BPP q Monday/Thursday, and UA Dopplers q Monday      Poor fetal growth affecting management of mother in third trimester  US today 1480 g (10%), AC 3%, BPP 8/8, MVP 7.6, S:D nL, cephalic  1hr NST bid  BPP 2x/week with UA dopplers weekly          Sunni Payne MD  Obstetrics  [facility] ([facility])

## 2024-11-16 LAB — MAGNESIUM SERPL-MCNC: 5.8 MG/DL (ref 1.6–2.6)

## 2024-11-16 PROCEDURE — 11000001 HC ACUTE MED/SURG PRIVATE ROOM

## 2024-11-16 PROCEDURE — 83735 ASSAY OF MAGNESIUM: CPT | Performed by: STUDENT IN AN ORGANIZED HEALTH CARE EDUCATION/TRAINING PROGRAM

## 2024-11-16 PROCEDURE — 51702 INSERT TEMP BLADDER CATH: CPT

## 2024-11-16 PROCEDURE — 99232 SBSQ HOSP IP/OBS MODERATE 35: CPT | Mod: ,,, | Performed by: STUDENT IN AN ORGANIZED HEALTH CARE EDUCATION/TRAINING PROGRAM

## 2024-11-16 PROCEDURE — G0378 HOSPITAL OBSERVATION PER HR: HCPCS

## 2024-11-16 PROCEDURE — 25000003 PHARM REV CODE 250: Performed by: OBSTETRICS & GYNECOLOGY

## 2024-11-16 PROCEDURE — 72100003 HC LABOR CARE, EA. ADDL. 8 HRS

## 2024-11-16 RX ORDER — NIFEDIPINE 30 MG/1
30 TABLET, EXTENDED RELEASE ORAL 2 TIMES DAILY
Status: DISCONTINUED | OUTPATIENT
Start: 2024-11-16 | End: 2024-11-29

## 2024-11-16 RX ADMIN — PRENATAL VITAMINS-IRON FUMARATE 27 MG IRON-FOLIC ACID 0.8 MG TABLET 1 TABLET: at 08:11

## 2024-11-16 RX ADMIN — NIFEDIPINE 30 MG: 30 TABLET, FILM COATED, EXTENDED RELEASE ORAL at 08:11

## 2024-11-16 NOTE — NURSING
0900- Pt short of breath, reflexes diminished reflexes, extremely fatigued, etc.     0905- MD notified of patient's symptoms.   Magnesium continuous infusion stopped.   Magnesium level drawn.     1015- Lab called critical magnesium level: 5.8    1016- MD notified via in person.   MD ordered to restart magnesium infusion at 1g/hr.

## 2024-11-16 NOTE — NURSING
Patient blood pressure was elevated at 1530. Messaged Dr. Payne to encounter plan of care. Informed MD that patient had no IV access. and the only blood pressure medication she has ordered are IV medication. Patient blood pressure was 187/86 at 1545 and 1615. PO procardia ordered. MD ordered to gain IV access on patient and take blood pressure every 20 minutes until stable.

## 2024-11-16 NOTE — PROGRESS NOTES
[facility]  Obstetrics  Antepartum Progress Note    Patient Name: Maria De Jesus Payne  MRN: [id number]  Admission Date: 11/13/2024  Hospital Length of Stay: 0 days  Attending Physician: Jasson Pineda MD  Primary Care Provider: Abena, Primary Doctor    Subjective:     Principal Problem:Severe pre-eclampsia in third trimester    HPI:  18 yo G1 with IUP at 32w0d.  Current pregnancy complicated by GHTN.  Pt was sent from clinic secondary to elevated BP in clinic.  Pt denies headaches or vision changes on admission.  Has noted some swelling.  Otherwise no complaints.    Hospital Course:  Pt admitted to OB service with preeclampsia.  Urine P/C ratio 0.53.  Labs wnl.  Ultrasound 11/13/24 consistent with IUGR (EFW 1480 g at the 10th%, AC 3rd%), UA doppler wnl  BMZ on 11/13 and 11/14 11/14/24: HD#2, asymptomatic.  BP wnl.  Pt to complete BMZ series today  11/15/24:  Blood pressures elevated to severe range.  Magnesium sulfate given.  11/16/24: Blood pressures normal to mild range today. Episode of shortness of breath, fatigue, diminished reflexes, mag stopped, Mag level 5.8, restarted magnesium at 1g/hr  +    Obstetric HPI:  Patient reports None contractions, active fetal movement, absent vaginal bleeding , absent loss of fluid      Objective:     Vital Signs (Most Recent):  Temp: 97.7 °F (36.5 °C) (11/16/24 0800)  Pulse: 98 (11/16/24 1228)  Resp: 18 (11/16/24 0800)  BP: 133/89 (11/16/24 1202)  SpO2: 100 % (11/16/24 1228) Vital Signs (24h Range):  Temp:  [97.7 °F (36.5 °C)-98.2 °F (36.8 °C)] 97.7 °F (36.5 °C)  Pulse:  [] 98  Resp:  [18] 18  SpO2:  [96 %-100 %] 100 %  BP: (119-190)/() 133/89        There is no height or weight on file to calculate BMI.    FHT: 135 bpm Cat 1 (reassuring) no accelerations  TOCO:  No contractions    Intake/Output Summary (Last 24 hours) at 11/16/2024 1337  Last data filed at 11/16/2024 1320  Gross per 24 hour   Intake 1075.82 ml   Output 2550 ml   Net -1474.18 ml        Significant Labs:  Recent Lab Results         11/16/24  0925   11/15/24  1647        Albumin   3.0       ALP   433       ALT   14       Anion Gap   13       Aniso   Slight       AST   20       Bands   1.0       Basophil %   0.0       BILIRUBIN TOTAL   0.2  Comment: For infants and newborns, interpretation of results should be based  on gestational age, weight and in agreement with clinical  observations.    Premature Infant recommended reference ranges:  Up to 24 hours.............<8.0 mg/dL  Up to 48 hours............<12.0 mg/dL  3-5 days..................<15.0 mg/dL  6-29 days.................<15.0 mg/dL         BUN   11       Calcium   9.5       Chloride   110       CO2   16       Creatinine   0.7       Differential Method   Manual       eGFR   >60       Eos %   0.0       Glucose   111       Gran %   84.0       Hematocrit   39.1       Hemoglobin   12.7       Immature Grans (Abs)   CANCELED  Comment: Mild elevation in immature granulocytes is non specific and   can be seen in a variety of conditions including stress response,   acute inflammation, trauma and pregnancy. Correlation with other   laboratory and clinical findings is essential.    Result canceled by the ancillary.         Immature Granulocytes   CANCELED  Comment: Result canceled by the ancillary.       Lymph %   7.0       Magnesium  5.8  Comment: MG critical result(s) called and verbal readback obtained from   TOD GRANADOS RN by JG7 11/16/2024 10:17           MCH   26.6       MCHC   32.5       MCV   82       Metamyelocytes   2.0       Mono %   2.0       MPV   11.2       Myelocytes   4.0       nRBC   1       Pathologist Review Peripheral Smear   REVIEWED  Comment:   Electronically reviewed and signed by:  Dylon Benito M.D.  Signed on 11/15/24 at 19:21  Erythrocytes with slight increase in pallor and occasional   polychromasia.  Occasional nucleated red blood cells are identified.    Polymorphonuclear cells are increased in number but  demonstrate a   full maturation sequence.  Lymphocytes within normal limits.    Reactive monocytes are present.  Negative for increase in blasts.    Platelets with occasional giant forms but otherwise within normal   limits.         Platelet Estimate   Appears normal       Platelet Count   223       Poikilocytosis   Slight       Potassium   4.0       PROTEIN TOTAL   7.4       RBC   4.78       RDW   19.2       Sodium   139       Teardrop Cells   Occasional       WBC   24.76               Physical Exam:   Constitutional: She is oriented to person, place, and time. No distress.    HENT:   Head: Normocephalic and atraumatic.      Cardiovascular:  Normal rate.             Pulmonary/Chest: Effort normal.        Abdominal: Soft. There is no abdominal tenderness.             Musculoskeletal: No tenderness or edema.       Neurological: She is alert and oriented to person, place, and time. No cranial nerve deficit.   Reflex Scores:       Bicep reflexes are 1+ on the right side and 1+ on the left side.    Psychiatric: She has a normal mood and affect.       Review of Systems   Constitutional:  Negative for chills and fever.   Gastrointestinal:  Negative for abdominal pain.   Genitourinary:  Negative for vaginal bleeding.   Neurological:  Negative for headaches.     Assessment/Plan:     19 y.o. female  at 32w3d for:    * Severe pre-eclampsia in third trimester  -Urine P/C ratio 0.53  -BP now elevated to severe range. Will begin magnesium sulfate 4 gram load, then 2 grams/hr x 24 hours.  -BMZ series complete  -In the setting of preeclampsia with severe features and IUGR, will plan inpatient surveillance until delivery with weekly labs (q Monday), BPP q Monday/Thursday, and UA Dopplers q Monday.  Plan delivery at 34 weeks EGA or sooner as indicated for maternal or fetal indications.      Poor fetal growth affecting management of mother in third trimester  US on  - 1480 g (10%), AC 3%, BPP 8/8, MVP 7.6, S:D nL,  cephalic  BPP 2x/week with UA dopplers weekly    Antepartum anemia  Continue oral iron supplement          Yanet Martines MD  Obstetrics  [facility]

## 2024-11-16 NOTE — SUBJECTIVE & OBJECTIVE
Obstetric HPI:  Patient reports None contractions, active fetal movement, absent vaginal bleeding , absent loss of fluid      Objective:     Vital Signs (Most Recent):  Temp: 97.7 °F (36.5 °C) (11/16/24 0800)  Pulse: 98 (11/16/24 1228)  Resp: 18 (11/16/24 0800)  BP: 133/89 (11/16/24 1202)  SpO2: 100 % (11/16/24 1228) Vital Signs (24h Range):  Temp:  [97.7 °F (36.5 °C)-98.2 °F (36.8 °C)] 97.7 °F (36.5 °C)  Pulse:  [] 98  Resp:  [18] 18  SpO2:  [96 %-100 %] 100 %  BP: (119-190)/() 133/89        There is no height or weight on file to calculate BMI.    FHT: 135 bpm Cat 1 (reassuring) no accelerations  TOCO:  No contractions    Intake/Output Summary (Last 24 hours) at 11/16/2024 1337  Last data filed at 11/16/2024 1320  Gross per 24 hour   Intake 1075.82 ml   Output 2550 ml   Net -1474.18 ml       Significant Labs:  Recent Lab Results         11/16/24  0925   11/15/24  1647        Albumin   3.0       ALP   433       ALT   14       Anion Gap   13       Aniso   Slight       AST   20       Bands   1.0       Basophil %   0.0       BILIRUBIN TOTAL   0.2  Comment: For infants and newborns, interpretation of results should be based  on gestational age, weight and in agreement with clinical  observations.    Premature Infant recommended reference ranges:  Up to 24 hours.............<8.0 mg/dL  Up to 48 hours............<12.0 mg/dL  3-5 days..................<15.0 mg/dL  6-29 days.................<15.0 mg/dL         BUN   11       Calcium   9.5       Chloride   110       CO2   16       Creatinine   0.7       Differential Method   Manual       eGFR   >60       Eos %   0.0       Glucose   111       Gran %   84.0       Hematocrit   39.1       Hemoglobin   12.7       Immature Grans (Abs)   CANCELED  Comment: Mild elevation in immature granulocytes is non specific and   can be seen in a variety of conditions including stress response,   acute inflammation, trauma and pregnancy. Correlation with other   laboratory and  clinical findings is essential.    Result canceled by the ancillary.         Immature Granulocytes   CANCELED  Comment: Result canceled by the ancillary.       Lymph %   7.0       Magnesium  5.8  Comment:  critical result(s) called and verbal readback obtained from   TOD GRANADOS RN by JG7 11/16/2024 10:17           MCH   26.6       MCHC   32.5       MCV   82       Metamyelocytes   2.0       Mono %   2.0       MPV   11.2       Myelocytes   4.0       nRBC   1       Pathologist Review Peripheral Smear   REVIEWED  Comment:   Electronically reviewed and signed by:  Dylon Benito M.D.  Signed on 11/15/24 at 19:21  Erythrocytes with slight increase in pallor and occasional   polychromasia.  Occasional nucleated red blood cells are identified.    Polymorphonuclear cells are increased in number but demonstrate a   full maturation sequence.  Lymphocytes within normal limits.    Reactive monocytes are present.  Negative for increase in blasts.    Platelets with occasional giant forms but otherwise within normal   limits.         Platelet Estimate   Appears normal       Platelet Count   223       Poikilocytosis   Slight       Potassium   4.0       PROTEIN TOTAL   7.4       RBC   4.78       RDW   19.2       Sodium   139       Teardrop Cells   Occasional       WBC   24.76               Physical Exam:   Constitutional: She is oriented to person, place, and time. No distress.    HENT:   Head: Normocephalic and atraumatic.      Cardiovascular:  Normal rate.             Pulmonary/Chest: Effort normal.        Abdominal: Soft. There is no abdominal tenderness.             Musculoskeletal: No tenderness or edema.       Neurological: She is alert and oriented to person, place, and time. No cranial nerve deficit.   Reflex Scores:       Bicep reflexes are 1+ on the right side and 1+ on the left side.    Psychiatric: She has a normal mood and affect.       Review of Systems   Constitutional:  Negative for chills and fever.    Gastrointestinal:  Negative for abdominal pain.   Genitourinary:  Negative for vaginal bleeding.   Neurological:  Negative for headaches.

## 2024-11-16 NOTE — PLAN OF CARE
Problem: Adult Inpatient Plan of Care  Goal: Plan of Care Review  Outcome: Progressing  Goal: Patient-Specific Goal (Individualized)  Outcome: Progressing  Goal: Absence of Hospital-Acquired Illness or Injury  Outcome: Progressing  Goal: Optimal Comfort and Wellbeing  Outcome: Progressing  Goal: Readiness for Transition of Care  Outcome: Progressing     Problem: Diabetes in Pregnancy  Goal: Optimal Coping  Outcome: Progressing  Goal: Blood Glucose Level Within Targeted Range  Outcome: Progressing     Problem:  Fall Injury Risk  Goal: Absence of Fall, Infant Drop and Related Injury  Outcome: Progressing     Problem: Hospitalized  Patient  Goal: Optimal Patient-Fetal Wellbeing  Outcome: Progressing     Problem: Skin Injury Risk Increased  Goal: Skin Health and Integrity  Outcome: Progressing     Problem: Hypertensive Disorders in Pregnancy  Goal: Patient-Fetal Stabilization  Outcome: Progressing

## 2024-11-17 PROCEDURE — 11000001 HC ACUTE MED/SURG PRIVATE ROOM

## 2024-11-17 PROCEDURE — 25000003 PHARM REV CODE 250: Performed by: OBSTETRICS & GYNECOLOGY

## 2024-11-17 PROCEDURE — 72100003 HC LABOR CARE, EA. ADDL. 8 HRS

## 2024-11-17 PROCEDURE — 59025 FETAL NON-STRESS TEST: CPT

## 2024-11-17 PROCEDURE — G0378 HOSPITAL OBSERVATION PER HR: HCPCS

## 2024-11-17 PROCEDURE — 99232 SBSQ HOSP IP/OBS MODERATE 35: CPT | Mod: ,,, | Performed by: OBSTETRICS & GYNECOLOGY

## 2024-11-17 RX ADMIN — NIFEDIPINE 30 MG: 30 TABLET, FILM COATED, EXTENDED RELEASE ORAL at 08:11

## 2024-11-17 RX ADMIN — PRENATAL VITAMINS-IRON FUMARATE 27 MG IRON-FOLIC ACID 0.8 MG TABLET 1 TABLET: at 08:11

## 2024-11-17 NOTE — PLAN OF CARE
POC reviewed with patient and family; verbalized understanding. BP monitored per orders. VSS. Afebrile. Care ongoing.

## 2024-11-17 NOTE — ASSESSMENT & PLAN NOTE
US on 11/13 - 1480 g (10%), AC 3%, BPP 8/8, MVP 7.6, S:D nL, cephalic  BPP 2x/week with UA dopplers weekly  Growth u/s q 3 weeks

## 2024-11-17 NOTE — ASSESSMENT & PLAN NOTE
-Urine P/C ratio 0.53  -Severe range BP elevation. S/P magnesium sulfate x 24 hours.  -Blood pressures well controlled on procardia XL 30 mg BID  -BMZ series complete  -In the setting of preeclampsia with severe features and IUGR, will plan inpatient surveillance until delivery with weekly labs (q Monday), BPP q Monday/Thursday, and UA Dopplers q Monday.  Plan delivery at 34 weeks EGA or sooner as indicated for maternal or fetal indications.

## 2024-11-17 NOTE — PROGRESS NOTES
[facility]  Obstetrics  Antepartum Progress Note    Patient Name: Maria De Jesus Payne  MRN: [id number]  Admission Date: 11/13/2024  Hospital Length of Stay: 0 days  Attending Physician: Jasson Pineda MD  Primary Care Provider: Abena, Primary Doctor    Subjective:     Principal Problem:Severe pre-eclampsia in third trimester    HPI:  18 yo G1 with IUP at 32w0d.  Current pregnancy complicated by GHTN.  Pt was sent from clinic secondary to elevated BP in clinic.  Pt denies headaches or vision changes on admission.  Has noted some swelling.  Otherwise no complaints.    Hospital Course:  Pt admitted to OB service with preeclampsia.  Urine P/C ratio 0.53.  Labs wnl.  Ultrasound 11/13/24 consistent with IUGR (EFW 1480 g at the 10th%, AC 3rd%), UA doppler wnl  BMZ on 11/13 and 11/14 11/14/24: HD#2, asymptomatic.  BP wnl.  Pt to complete BMZ series today  11/15/24:  Blood pressures elevated to severe range.  Magnesium sulfate given.  11/16/24: Blood pressures normal to mild range today. Episode of shortness of breath, fatigue, diminished reflexes, mag stopped, Mag level 5.8, restarted magnesium at 1g/hr  11/17/24 - Procardia XL 30 mg BID initiated for blood pressure control.  Blood pressures stable.  Patient asymptomatic.    Obstetric HPI:  Patient reports no contractions, active fetal movement, absent vaginal bleeding , absent loss of fluid      No headache or visual changes.  C/o right arm pain and swelling near IV site.      Objective:     Vital Signs (Most Recent):  Temp: 98 °F (36.7 °C) (11/17/24 0709)  Pulse: 91 (11/17/24 0833)  Resp: 18 (11/17/24 0709)  BP: (!) 140/92 (11/17/24 0709)  SpO2: 100 % (11/17/24 0833) Vital Signs (24h Range):  Temp:  [97.7 °F (36.5 °C)-98 °F (36.7 °C)] 98 °F (36.7 °C)  Pulse:  [] 91  Resp:  [18] 18  SpO2:  [98 %-100 %] 100 %  BP: (129-152)/() 140/92        There is no height or weight on file to calculate BMI.    FHT: 140 Cat 1 (reassuring)  TOCO:  Q 0  minutes      Intake/Output Summary (Last 24 hours) at 2024 1036  Last data filed at 2024 2215  Gross per 24 hour   Intake 1314.01 ml   Output 2900 ml   Net -1585.99 ml       Significant Labs:  Recent Lab Results       None            Physical Exam:   Constitutional: She is oriented to person, place, and time. No distress.    HENT:   Head: Normocephalic and atraumatic.      Cardiovascular:  Normal rate.             Pulmonary/Chest: Effort normal.        Abdominal: Soft. There is no abdominal tenderness.             Musculoskeletal: Edema present. No tenderness.       Neurological: She is alert and oriented to person, place, and time.     Psychiatric: She has a normal mood and affect.         Assessment/Plan:     19 y.o. female  at 32w4d for:    * Severe pre-eclampsia in third trimester  -Urine P/C ratio 0.53  -Severe range BP elevation. S/P magnesium sulfate x 24 hours.  -Blood pressures well controlled on procardia XL 30 mg BID  -BMZ series complete  -In the setting of preeclampsia with severe features and IUGR, will plan inpatient surveillance until delivery with weekly labs (q Monday), BPP q Monday/Thursday, and UA Dopplers q Monday.  Plan delivery at 34 weeks EGA or sooner as indicated for maternal or fetal indications.      Poor fetal growth affecting management of mother in third trimester  US on  - 1480 g (10%), AC 3%, BPP 8/8, MVP 7.6, S:D nL, cephalic  BPP 2x/week with UA dopplers weekly  Growth u/s q 3 weeks    Antepartum anemia  Continue oral iron supplement          Sunni Payne MD  Obstetrics  [facility]

## 2024-11-17 NOTE — SUBJECTIVE & OBJECTIVE
Obstetric HPI:  Patient reports no contractions, active fetal movement, absent vaginal bleeding , absent loss of fluid      No headache or visual changes.  C/o right arm pain and swelling near IV site.      Objective:     Vital Signs (Most Recent):  Temp: 98 °F (36.7 °C) (11/17/24 0709)  Pulse: 91 (11/17/24 0833)  Resp: 18 (11/17/24 0709)  BP: (!) 140/92 (11/17/24 0709)  SpO2: 100 % (11/17/24 0833) Vital Signs (24h Range):  Temp:  [97.7 °F (36.5 °C)-98 °F (36.7 °C)] 98 °F (36.7 °C)  Pulse:  [] 91  Resp:  [18] 18  SpO2:  [98 %-100 %] 100 %  BP: (129-152)/() 140/92        There is no height or weight on file to calculate BMI.    FHT: 140 Cat 1 (reassuring)  TOCO:  Q 0 minutes      Intake/Output Summary (Last 24 hours) at 11/17/2024 1036  Last data filed at 11/16/2024 2215  Gross per 24 hour   Intake 1314.01 ml   Output 2900 ml   Net -1585.99 ml       Significant Labs:  Recent Lab Results       None            Physical Exam:   Constitutional: She is oriented to person, place, and time. No distress.    HENT:   Head: Normocephalic and atraumatic.      Cardiovascular:  Normal rate.             Pulmonary/Chest: Effort normal.        Abdominal: Soft. There is no abdominal tenderness.             Musculoskeletal: Edema present. No tenderness.       Neurological: She is alert and oriented to person, place, and time.     Psychiatric: She has a normal mood and affect.

## 2024-11-18 PROBLEM — R00.0 TACHYCARDIA: Status: ACTIVE | Noted: 2024-11-18

## 2024-11-18 LAB
ALBUMIN SERPL BCP-MCNC: 2.8 G/DL (ref 3.5–5.2)
ALP SERPL-CCNC: 434 U/L (ref 40–150)
ALT SERPL W/O P-5'-P-CCNC: 17 U/L (ref 10–44)
ANION GAP SERPL CALC-SCNC: 10 MMOL/L (ref 8–16)
AST SERPL-CCNC: 22 U/L (ref 10–40)
BASOPHILS # BLD AUTO: 0.04 K/UL (ref 0–0.2)
BASOPHILS NFR BLD: 0.3 % (ref 0–1.9)
BILIRUB SERPL-MCNC: 0.2 MG/DL (ref 0.1–1)
BUN SERPL-MCNC: 12 MG/DL (ref 6–20)
CALCIUM SERPL-MCNC: 9 MG/DL (ref 8.7–10.5)
CHLORIDE SERPL-SCNC: 108 MMOL/L (ref 95–110)
CO2 SERPL-SCNC: 18 MMOL/L (ref 23–29)
CREAT SERPL-MCNC: 0.7 MG/DL (ref 0.5–1.4)
D DIMER PPP IA.FEU-MCNC: 3.07 MG/L FEU
DIFFERENTIAL METHOD BLD: ABNORMAL
EOSINOPHIL # BLD AUTO: 0 K/UL (ref 0–0.5)
EOSINOPHIL NFR BLD: 0.1 % (ref 0–8)
ERYTHROCYTE [DISTWIDTH] IN BLOOD BY AUTOMATED COUNT: 18.6 % (ref 11.5–14.5)
EST. GFR  (NO RACE VARIABLE): >60 ML/MIN/1.73 M^2
GLUCOSE SERPL-MCNC: 88 MG/DL (ref 70–110)
HCT VFR BLD AUTO: 39.6 % (ref 37–48.5)
HGB BLD-MCNC: 12.7 G/DL (ref 12–16)
IMM GRANULOCYTES # BLD AUTO: 0.35 K/UL (ref 0–0.04)
IMM GRANULOCYTES NFR BLD AUTO: 2.6 % (ref 0–0.5)
LYMPHOCYTES # BLD AUTO: 2.3 K/UL (ref 1–4.8)
LYMPHOCYTES NFR BLD: 17.3 % (ref 18–48)
MCH RBC QN AUTO: 26.3 PG (ref 27–31)
MCHC RBC AUTO-ENTMCNC: 32.1 G/DL (ref 32–36)
MCV RBC AUTO: 82 FL (ref 82–98)
MONOCYTES # BLD AUTO: 1.3 K/UL (ref 0.3–1)
MONOCYTES NFR BLD: 9.6 % (ref 4–15)
NEUTROPHILS # BLD AUTO: 9.4 K/UL (ref 1.8–7.7)
NEUTROPHILS NFR BLD: 70.1 % (ref 38–73)
NRBC BLD-RTO: 0 /100 WBC
PLATELET # BLD AUTO: 203 K/UL (ref 150–450)
PMV BLD AUTO: 10.5 FL (ref 9.2–12.9)
POTASSIUM SERPL-SCNC: 4.3 MMOL/L (ref 3.5–5.1)
PROT SERPL-MCNC: 6.9 G/DL (ref 6–8.4)
RBC # BLD AUTO: 4.82 M/UL (ref 4–5.4)
SODIUM SERPL-SCNC: 136 MMOL/L (ref 136–145)
WBC # BLD AUTO: 13.37 K/UL (ref 3.9–12.7)

## 2024-11-18 PROCEDURE — 63600175 PHARM REV CODE 636 W HCPCS: Performed by: STUDENT IN AN ORGANIZED HEALTH CARE EDUCATION/TRAINING PROGRAM

## 2024-11-18 PROCEDURE — 87632 RESP VIRUS 6-11 TARGETS: CPT | Performed by: OBSTETRICS & GYNECOLOGY

## 2024-11-18 PROCEDURE — 25500020 PHARM REV CODE 255: Performed by: OBSTETRICS & GYNECOLOGY

## 2024-11-18 PROCEDURE — 59025 FETAL NON-STRESS TEST: CPT

## 2024-11-18 PROCEDURE — 11000001 HC ACUTE MED/SURG PRIVATE ROOM

## 2024-11-18 PROCEDURE — 93010 ELECTROCARDIOGRAM REPORT: CPT | Mod: 59,,, | Performed by: STUDENT IN AN ORGANIZED HEALTH CARE EDUCATION/TRAINING PROGRAM

## 2024-11-18 PROCEDURE — 80053 COMPREHEN METABOLIC PANEL: CPT | Performed by: STUDENT IN AN ORGANIZED HEALTH CARE EDUCATION/TRAINING PROGRAM

## 2024-11-18 PROCEDURE — 85025 COMPLETE CBC W/AUTO DIFF WBC: CPT | Performed by: STUDENT IN AN ORGANIZED HEALTH CARE EDUCATION/TRAINING PROGRAM

## 2024-11-18 PROCEDURE — 36415 COLL VENOUS BLD VENIPUNCTURE: CPT | Performed by: STUDENT IN AN ORGANIZED HEALTH CARE EDUCATION/TRAINING PROGRAM

## 2024-11-18 PROCEDURE — 25000003 PHARM REV CODE 250: Performed by: OBSTETRICS & GYNECOLOGY

## 2024-11-18 PROCEDURE — 93005 ELECTROCARDIOGRAM TRACING: CPT

## 2024-11-18 PROCEDURE — 85379 FIBRIN DEGRADATION QUANT: CPT | Performed by: STUDENT IN AN ORGANIZED HEALTH CARE EDUCATION/TRAINING PROGRAM

## 2024-11-18 PROCEDURE — 25000003 PHARM REV CODE 250: Performed by: STUDENT IN AN ORGANIZED HEALTH CARE EDUCATION/TRAINING PROGRAM

## 2024-11-18 PROCEDURE — 93010 ELECTROCARDIOGRAM REPORT: CPT | Mod: ,,, | Performed by: STUDENT IN AN ORGANIZED HEALTH CARE EDUCATION/TRAINING PROGRAM

## 2024-11-18 PROCEDURE — 87798 DETECT AGENT NOS DNA AMP: CPT | Mod: 59 | Performed by: STUDENT IN AN ORGANIZED HEALTH CARE EDUCATION/TRAINING PROGRAM

## 2024-11-18 RX ORDER — LORAZEPAM 1 MG/1
1 TABLET ORAL EVERY 6 HOURS PRN
Status: DISCONTINUED | OUTPATIENT
Start: 2024-11-18 | End: 2024-12-01 | Stop reason: HOSPADM

## 2024-11-18 RX ADMIN — PRENATAL VITAMINS-IRON FUMARATE 27 MG IRON-FOLIC ACID 0.8 MG TABLET 1 TABLET: at 08:11

## 2024-11-18 RX ADMIN — IOHEXOL 100 ML: 350 INJECTION, SOLUTION INTRAVENOUS at 08:11

## 2024-11-18 RX ADMIN — NIFEDIPINE 30 MG: 30 TABLET, FILM COATED, EXTENDED RELEASE ORAL at 08:11

## 2024-11-18 RX ADMIN — LORAZEPAM 1 MG: 1 TABLET ORAL at 06:11

## 2024-11-18 RX ADMIN — NIFEDIPINE 30 MG: 30 TABLET, FILM COATED, EXTENDED RELEASE ORAL at 10:11

## 2024-11-18 RX ADMIN — SODIUM CHLORIDE, POTASSIUM CHLORIDE, SODIUM LACTATE AND CALCIUM CHLORIDE 500 ML: 600; 310; 30; 20 INJECTION, SOLUTION INTRAVENOUS at 01:11

## 2024-11-18 NOTE — PROGRESS NOTES
[facility] ([facility])  Obstetrics  Antepartum Progress Note    Patient Name: Maria De Jesus Payne  MRN: [id number]  Admission Date: 11/13/2024  Hospital Length of Stay: 0 days  Attending Physician: Jasson Pineda MD  Primary Care Provider: Abena, Primary Doctor    Subjective:     Principal Problem:Severe pre-eclampsia in third trimester    HPI:  18 yo G1 with IUP at 32w0d.  Current pregnancy complicated by GHTN.  Pt was sent from clinic secondary to elevated BP in clinic.  Pt denies headaches or vision changes on admission.  Has noted some swelling.  Otherwise no complaints.    Hospital Course:  Pt admitted to OB service with preeclampsia.  Urine P/C ratio 0.53.  Labs wnl.  Ultrasound 11/13/24 consistent with IUGR (EFW 1480 g at the 10th%, AC 3rd%), UA doppler wnl  BMZ on 11/13 and 11/14 11/14/24: HD#2, asymptomatic.  BP wnl.  Pt to complete BMZ series today  11/15/24:  Blood pressures elevated to severe range.  Magnesium sulfate given.  11/16/24: Blood pressures normal to mild range today. Episode of shortness of breath, fatigue, diminished reflexes, mag stopped, Mag level 5.8, restarted magnesium at 1g/hr  11/17/24 - Procardia XL 30 mg BID initiated for blood pressure control.  Blood pressures stable.  Patient asymptomatic.  11/18/24: BP stable on current regimen.     Obstetric HPI:  Patient reports None contractions, active fetal movement, absent vaginal bleeding , absent loss of fluid      Objective:     Vital Signs (Most Recent):  Temp: 98 °F (36.7 °C) (11/18/24 0815)  Pulse: 98 (11/18/24 0815)  Resp: 18 (11/18/24 0815)  BP: (!) 153/91 (11/18/24 0815)  SpO2: 98 % (11/18/24 0815) Vital Signs (24h Range):  Temp:  [98 °F (36.7 °C)-99.1 °F (37.3 °C)] 98 °F (36.7 °C)  Pulse:  [] 98  Resp:  [18] 18  SpO2:  [95 %-99 %] 98 %  BP: (138-153)/(82-97) 153/91        There is no height or weight on file to calculate BMI.        No intake or output data in the 24 hours ending 11/18/24 1142    Significant  Labs:  Recent Lab Results       None            Physical Exam:   Constitutional: She is oriented to person, place, and time. She appears well-developed and well-nourished. No distress.    HENT:   Head: Normocephalic and atraumatic.    Eyes: No scleral icterus.      Pulmonary/Chest: Effort normal. No respiratory distress.        Abdominal:   gravid             Musculoskeletal: Normal range of motion.       Neurological: She is alert and oriented to person, place, and time.    Skin: Skin is warm and dry. She is not diaphoretic.    Psychiatric: She has a normal mood and affect. Her behavior is normal. Judgment and thought content normal.         Assessment/Plan:     19 y.o. female  at 32w5d for:    * Severe pre-eclampsia in third trimester  -Urine P/C ratio 0.53 on   - S/P magnesium sulfate x 24 hours.  - Blood pressures well controlled on procardia XL 30 mg BID  - BMZ series complete  - In the setting of preeclampsia with severe features and IUGR, will plan inpatient surveillance until delivery with weekly labs (q Monday), BPP q Monday/Thursday, and UA Dopplers q Monday.  Plan delivery at 34 weeks EGA or sooner as indicated for maternal or fetal indications.      Poor fetal growth affecting management of mother in third trimester  US on  - 1480 g (10%), AC 3%, BPP 8/8, MVP 7.6, S:D nL, cephalic  BPP 2x/week with UA dopplers weekly  Growth u/s q 3 weeks    Antepartum anemia  - Continue oral iron supplement          Shila Clarke PA-C  Obstetrics  [facility] ([facility])

## 2024-11-18 NOTE — NURSING
The patient heart rate was in the 160's. Her blood pressure was 142/85. IV access established. MD Sondra in procedure. Contacted JONO Johns. JONO Johns ordered an EKG. Respiratory contacted to do EKG at 1220.

## 2024-11-18 NOTE — SUBJECTIVE & OBJECTIVE
Obstetric HPI:  Patient reports None contractions, active fetal movement, absent vaginal bleeding , absent loss of fluid      Objective:     Vital Signs (Most Recent):  Temp: 98 °F (36.7 °C) (11/18/24 0815)  Pulse: 98 (11/18/24 0815)  Resp: 18 (11/18/24 0815)  BP: (!) 153/91 (11/18/24 0815)  SpO2: 98 % (11/18/24 0815) Vital Signs (24h Range):  Temp:  [98 °F (36.7 °C)-99.1 °F (37.3 °C)] 98 °F (36.7 °C)  Pulse:  [] 98  Resp:  [18] 18  SpO2:  [95 %-99 %] 98 %  BP: (138-153)/(82-97) 153/91        There is no height or weight on file to calculate BMI.        No intake or output data in the 24 hours ending 11/18/24 1142    Significant Labs:  Recent Lab Results       None            Physical Exam:   Constitutional: She is oriented to person, place, and time. She appears well-developed and well-nourished. No distress.    HENT:   Head: Normocephalic and atraumatic.    Eyes: No scleral icterus.      Pulmonary/Chest: Effort normal. No respiratory distress.        Abdominal:   gravid             Musculoskeletal: Normal range of motion.       Neurological: She is alert and oriented to person, place, and time.    Skin: Skin is warm and dry. She is not diaphoretic.    Psychiatric: She has a normal mood and affect. Her behavior is normal. Judgment and thought content normal.

## 2024-11-18 NOTE — ASSESSMENT & PLAN NOTE
-Urine P/C ratio 0.53 on 11/13  - S/P magnesium sulfate x 24 hours.  - Blood pressures well controlled on procardia XL 30 mg BID  - BMZ series complete  - In the setting of preeclampsia with severe features and IUGR, will plan inpatient surveillance until delivery with weekly labs (q Monday), BPP q Monday/Thursday, and UA Dopplers q Monday.  Plan delivery at 34 weeks EGA or sooner as indicated for maternal or fetal indications.

## 2024-11-18 NOTE — PLAN OF CARE
"Patient heart rate in 160's this shift. Denies any chest pain, SOB, dizziness, or fatigue. Reports chest feeling "different". EKG ordered. Sinus Tachycardia detected. LR bolus ordered and administered. LR is now infusing at 125 mL/hr. Heart rate has decrease since this morning. Patient plan of care ongoing.    Problem: Diabetes in Pregnancy  Goal: Optimal Coping  Outcome:  Error  Goal: Blood Glucose Level Within Targeted Range  Outcome:  Error     Problem: Adult Inpatient Plan of Care  Goal: Plan of Care Review  Outcome: Progressing  Goal: Patient-Specific Goal (Individualized)  Outcome: Progressing  Goal: Absence of Hospital-Acquired Illness or Injury  Outcome: Progressing  Goal: Optimal Comfort and Wellbeing  Outcome: Progressing  Goal: Readiness for Transition of Care  Outcome: Progressing     Problem:  Fall Injury Risk  Goal: Absence of Fall, Infant Drop and Related Injury  Outcome: Progressing     Problem: Hospitalized  Patient  Goal: Optimal Patient-Fetal Wellbeing  Outcome: Progressing     Problem: Skin Injury Risk Increased  Goal: Skin Health and Integrity  Outcome: Progressing     Problem: Hypertensive Disorders in Pregnancy  Goal: Patient-Fetal Stabilization  Outcome: Progressing     "

## 2024-11-18 NOTE — PLAN OF CARE
Patient afebrile and had no falls this shift. No bleeding or drainage. Voids spontaneously. Ambulates independently. No c/o pain. Vital signs stable at this time. Will continue to monitor.

## 2024-11-18 NOTE — NURSING
Patient is complaining of chest and back pain with SOB with onset of 15 minuets ago. BP-141/90 HR-133 02-99% Breathing unlabored. Patient is awake and oriented. MD notified.

## 2024-11-19 PROBLEM — O99.019 ANTEPARTUM ANEMIA: Status: RESOLVED | Noted: 2024-05-07 | Resolved: 2024-11-19

## 2024-11-19 PROBLEM — R09.81 NASAL SINUS CONGESTION: Status: ACTIVE | Noted: 2024-11-19

## 2024-11-19 PROBLEM — O99.810 ABNORMAL GLUCOSE AFFECTING PREGNANCY: Status: RESOLVED | Noted: 2024-10-16 | Resolved: 2024-11-19

## 2024-11-19 PROBLEM — R42 DIZZINESS: Status: RESOLVED | Noted: 2024-10-17 | Resolved: 2024-11-19

## 2024-11-19 PROBLEM — Z34.93 ENCOUNTER FOR SUPERVISION OF NORMAL PREGNANCY IN THIRD TRIMESTER: Status: RESOLVED | Noted: 2024-05-28 | Resolved: 2024-11-19

## 2024-11-19 LAB

## 2024-11-19 PROCEDURE — 99231 SBSQ HOSP IP/OBS SF/LOW 25: CPT | Mod: ,,, | Performed by: OBSTETRICS & GYNECOLOGY

## 2024-11-19 PROCEDURE — 25000242 PHARM REV CODE 250 ALT 637 W/ HCPCS: Performed by: OBSTETRICS & GYNECOLOGY

## 2024-11-19 PROCEDURE — 25000003 PHARM REV CODE 250: Performed by: OBSTETRICS & GYNECOLOGY

## 2024-11-19 PROCEDURE — 59025 FETAL NON-STRESS TEST: CPT

## 2024-11-19 PROCEDURE — 11000001 HC ACUTE MED/SURG PRIVATE ROOM

## 2024-11-19 RX ORDER — FLUTICASONE PROPIONATE 50 MCG
2 SPRAY, SUSPENSION (ML) NASAL DAILY
Status: DISCONTINUED | OUTPATIENT
Start: 2024-11-19 | End: 2024-12-01 | Stop reason: HOSPADM

## 2024-11-19 RX ORDER — CETIRIZINE HYDROCHLORIDE 5 MG/1
5 TABLET ORAL DAILY
Status: DISCONTINUED | OUTPATIENT
Start: 2024-11-19 | End: 2024-11-28

## 2024-11-19 RX ADMIN — NIFEDIPINE 30 MG: 30 TABLET, FILM COATED, EXTENDED RELEASE ORAL at 08:11

## 2024-11-19 RX ADMIN — CETIRIZINE HYDROCHLORIDE 5 MG: 5 TABLET ORAL at 11:11

## 2024-11-19 RX ADMIN — FERROUS SULFATE TAB 325 MG (65 MG ELEMENTAL FE) 1 EACH: 325 (65 FE) TAB at 08:11

## 2024-11-19 RX ADMIN — PRENATAL VITAMINS-IRON FUMARATE 27 MG IRON-FOLIC ACID 0.8 MG TABLET 1 TABLET: at 08:11

## 2024-11-19 RX ADMIN — FLUTICASONE PROPIONATE 100 MCG: 50 SPRAY, METERED NASAL at 11:11

## 2024-11-19 NOTE — PROGRESS NOTES
[facility] ([facility])  Obstetrics  Antepartum Progress Note    Patient Name: Maria De Jesus Payne  MRN: [id number]  Admission Date: 11/13/2024  Hospital Length of Stay: 1 days  Attending Physician: Jasson Pineda MD  Primary Care Provider: Abena, Primary Doctor    Subjective:     Principal Problem:Severe pre-eclampsia in third trimester    HPI:  18 yo G1 with IUP at 32w0d.  Current pregnancy complicated by GHTN.  Pt was sent from clinic secondary to elevated BP in clinic.  Pt denies headaches or vision changes on admission.  Has noted some swelling.  Otherwise no complaints.    Hospital Course:  Pt admitted to OB service with preeclampsia.  Urine P/C ratio 0.53.  Labs wnl.  Ultrasound 11/13/24 consistent with IUGR (EFW 1480 g at the 10th%, AC 3rd%), UA doppler wnl  BMZ on 11/13 and 11/14 11/14/24: HD#2, asymptomatic.  BP wnl.  Pt to complete BMZ series today  11/15/24:  Blood pressures elevated to severe range.  Magnesium sulfate given.  11/16/24: Blood pressures normal to mild range today. Episode of shortness of breath, fatigue, diminished reflexes, mag stopped, Mag level 5.8, restarted magnesium at 1g/hr  11/17/24 - Procardia XL 30 mg BID initiated for blood pressure control.  Blood pressures stable.  Patient asymptomatic.  11/18/24: BP stable on current regimen. JAD 12.8. MVP 4.9 cm. Breech presentation. No evidence of previa. Anterior placenta. Biophysical profile score 8/8 11/19/24--tacchycardia, shortness of breath and chest pain, neg CXR, elevated d dimer, neg resp panel, neg CTA-p; small pleural effusion; chest pain improved with ativan;     Obstetric HPI:  Patient reports None contractions, active fetal movement, absent vaginal bleeding , absent loss of fluid      Objective:     Vital Signs (Most Recent):  Temp: 98 °F (36.7 °C) (11/19/24 0808)  Pulse: (!) 113 (11/19/24 0808)  Resp: 18 (11/19/24 0808)  BP: (!) 152/82 (11/19/24 0808)  SpO2: 98 % (11/19/24 0420) Vital Signs (24h Range):  Temp:   [98 °F (36.7 °C)-98.7 °F (37.1 °C)] 98 °F (36.7 °C)  Pulse:  [] 113  Resp:  [18-19] 18  SpO2:  [96 %-99 %] 98 %  BP: (129-157)/(82-94) 152/82        There is no height or weight on file to calculate BMI.    FHT: to be monitored later this am    No intake or output data in the 24 hours ending 11/19/24 1008    Cervical Exam:deferred     Significant Labs:  Recent Lab Results         11/18/24  2203   11/18/24  1818   11/18/24  1808        Respiratory Infection Panel Source NP Swab           Adenovirus Not Detected           Coronavirus 229E, Common Cold Virus Not Detected           Coronavirus HKU1, Common Cold Virus Not Detected           Coronavirus NL63, Common Cold Virus Not Detected           Coronavirus OC43, Common Cold Virus Not Detected  Comment: The Coronavirus strains detected in this test cause the common cold.  These strains are not the COVID-19 (novel Coronavirus)strain   associated with the respiratory disease outbreak.             Human Metapneumovirus Not Detected           Human Rhinovirus/Enterovirus Not Detected           Influenza A H1-2009 Not Detected           Influenza B Not Detected           Parainfluenza Virus 1 Not Detected           Parainfluenza Virus 2 Not Detected           Parainfluenza Virus 3 Not Detected           Parainfluenza Virus 4 Not Detected           Respiratory Syncytial Virus Not Detected           Bordetella Parapertussis ([id number]) Not Detected           Bordetella pertussis (ptxP) Not Detected           Chlamydia pneumoniae Not Detected           Mycoplasma pneumoniae Not Detected  Comment: Respiratory Infection Panel testing performed by Multiplex PCR.           Albumin   2.8         ALP   434         ALT   17         Anion Gap   10         AST   22         Baso #   0.04         Basophil %   0.3         BILIRUBIN TOTAL   0.2  Comment: For infants and newborns, interpretation of results should be based  on gestational age, weight and in agreement with  clinical  observations.    Premature Infant recommended reference ranges:  Up to 24 hours.............<8.0 mg/dL  Up to 48 hours............<12.0 mg/dL  3-5 days..................<15.0 mg/dL  6-29 days.................<15.0 mg/dL           BUN   12         Calcium   9.0         Chloride   108         CO2   18         Creatinine   0.7         D-Dimer     3.07  Comment: The quantitative D-dimer assay should be used as an aid in   the diagnosis of deep vein thrombosis and pulmonary embolism  in patients with the appropriate presentation and clinical  history. The upper limit of the reference interval and the clinical   cut off   point are identical. Causes of a positive (>0.50 mg/L FEU) D-Dimer   test  include, but are not limited to: DVT, PE, DIC, thrombolytic   therapy, anticoagulant therapy, recent surgery, trauma, or   pregnancy, disseminated malignancy, aortic aneurysm, cirrhosis,  and severe infection. False negative results may occur in   patients with distal DVT.         Differential Method   Automated         eGFR   >60         Eos #   0.0         Eos %   0.1         Glucose   88         Gran # (ANC)   9.4         Gran %   70.1         Hematocrit   39.6         Hemoglobin   12.7         Immature Grans (Abs)   0.35  Comment: Mild elevation in immature granulocytes is non specific and   can be seen in a variety of conditions including stress response,   acute inflammation, trauma and pregnancy. Correlation with other   laboratory and clinical findings is essential.           Immature Granulocytes   2.6         Lymph #   2.3         Lymph %   17.3         MCH   26.3         MCHC   32.1         MCV   82         Mono #   1.3         Mono %   9.6         MPV   10.5         nRBC   0         Platelet Count   203         Potassium   4.3         PROTEIN TOTAL   6.9         RBC   4.82         RDW   18.6         SARS-CoV2 (COVID-19) Qualitative PCR Not Detected           Sodium   136         WBC   13.37                  Physical Exam:   Constitutional: She is oriented to person, place, and time. She appears well-developed and well-nourished.    HENT:   Head: Normocephalic.    Eyes: Pupils are equal, round, and reactive to light. Conjunctivae are normal.     Cardiovascular:  Normal rate and regular rhythm.             Pulmonary/Chest: Effort normal.        Abdominal: Soft.     Genitourinary:    Genitourinary Comments: Gravid, non tender             Musculoskeletal: Normal range of motion and moves all extremeties. No edema.       Neurological: She is alert and oriented to person, place, and time. She has normal reflexes. She exhibits normal muscle tone.    Skin: Skin is warm and dry.    Psychiatric: She has a normal mood and affect. Her behavior is normal. Judgment and thought content normal.       Review of Systems  Assessment/Plan:     19 y.o. female  at 32w6d for:    * Severe pre-eclampsia in third trimester  -Urine P/C ratio 0.53 on   - S/P magnesium sulfate x 24 hours.  - Blood pressures well controlled on procardia XL 30 mg BID  - BMZ series complete  - In the setting of preeclampsia with severe features and IUGR, will plan inpatient surveillance until delivery with weekly labs (q Monday), BPP q Monday/Thursday, and UA Dopplers q Monday.  Plan delivery at 34 weeks EGA or sooner as indicated for maternal or fetal indications.  2024  Denies preE symtpoms  Bp stable on procardia 30 bid  S/p magnesium sulfate; s/p bmz series  Continue nst bid  Bpp due on Thursday; u/a with dopplers on Monday  Repeat labs on Monday  Delivery at 34 wks or sooner for maternal or fetal indications      Nasal sinus congestion  2024  Add zyrtec and flonase    Tachycardia  2024  Negative EKG, chest xray, cta    Poor fetal growth affecting management of mother in third trimester  US on  - 1480 g (10%), AC 3%, BPP 8/8, MVP 7.6, S:D nL, cephalic  BPP 2x/week with UA dopplers weekly  Growth u/s q 3  adam Mclain MD  Obstetrics  [facility] ([facility])

## 2024-11-19 NOTE — SUBJECTIVE & OBJECTIVE
Obstetric HPI:  Patient reports None contractions, active fetal movement, absent vaginal bleeding , absent loss of fluid . Denies preE symptoms, denies chest pain; c/o sinus congestion     Objective:     Vital Signs (Most Recent):  Temp: 98 °F (36.7 °C) (11/19/24 0808)  Pulse: (!) 113 (11/19/24 0808)  Resp: 18 (11/19/24 0808)  BP: (!) 152/82 (11/19/24 0808)  SpO2: 98 % (11/19/24 0420) Vital Signs (24h Range):  Temp:  [98 °F (36.7 °C)-98.7 °F (37.1 °C)] 98 °F (36.7 °C)  Pulse:  [] 113  Resp:  [18-19] 18  SpO2:  [96 %-99 %] 98 %  BP: (129-157)/(82-94) 152/82        There is no height or weight on file to calculate BMI.  Fht:  to be monitored later today  No intake or output data in the 24 hours ending 11/19/24 1003    Cervix deferred    Significant Labs:  Recent Lab Results         11/18/24  2203   11/18/24  1818   11/18/24  1808        Respiratory Infection Panel Source NP Swab           Adenovirus Not Detected           Coronavirus 229E, Common Cold Virus Not Detected           Coronavirus HKU1, Common Cold Virus Not Detected           Coronavirus NL63, Common Cold Virus Not Detected           Coronavirus OC43, Common Cold Virus Not Detected  Comment: The Coronavirus strains detected in this test cause the common cold.  These strains are not the COVID-19 (novel Coronavirus)strain   associated with the respiratory disease outbreak.             Human Metapneumovirus Not Detected           Human Rhinovirus/Enterovirus Not Detected           Influenza A H1-2009 Not Detected           Influenza B Not Detected           Parainfluenza Virus 1 Not Detected           Parainfluenza Virus 2 Not Detected           Parainfluenza Virus 3 Not Detected           Parainfluenza Virus 4 Not Detected           Respiratory Syncytial Virus Not Detected           Bordetella Parapertussis ([id number]) Not Detected           Bordetella pertussis (ptxP) Not Detected           Chlamydia pneumoniae Not Detected           Mycoplasma  pneumoniae Not Detected  Comment: Respiratory Infection Panel testing performed by Multiplex PCR.           Albumin   2.8         ALP   434         ALT   17         Anion Gap   10         AST   22         Baso #   0.04         Basophil %   0.3         BILIRUBIN TOTAL   0.2  Comment: For infants and newborns, interpretation of results should be based  on gestational age, weight and in agreement with clinical  observations.    Premature Infant recommended reference ranges:  Up to 24 hours.............<8.0 mg/dL  Up to 48 hours............<12.0 mg/dL  3-5 days..................<15.0 mg/dL  6-29 days.................<15.0 mg/dL           BUN   12         Calcium   9.0         Chloride   108         CO2   18         Creatinine   0.7         D-Dimer     3.07  Comment: The quantitative D-dimer assay should be used as an aid in   the diagnosis of deep vein thrombosis and pulmonary embolism  in patients with the appropriate presentation and clinical  history. The upper limit of the reference interval and the clinical   cut off   point are identical. Causes of a positive (>0.50 mg/L FEU) D-Dimer   test  include, but are not limited to: DVT, PE, DIC, thrombolytic   therapy, anticoagulant therapy, recent surgery, trauma, or   pregnancy, disseminated malignancy, aortic aneurysm, cirrhosis,  and severe infection. False negative results may occur in   patients with distal DVT.         Differential Method   Automated         eGFR   >60         Eos #   0.0         Eos %   0.1         Glucose   88         Gran # (ANC)   9.4         Gran %   70.1         Hematocrit   39.6         Hemoglobin   12.7         Immature Grans (Abs)   0.35  Comment: Mild elevation in immature granulocytes is non specific and   can be seen in a variety of conditions including stress response,   acute inflammation, trauma and pregnancy. Correlation with other   laboratory and clinical findings is essential.           Immature Granulocytes   2.6         Lymph #    2.3         Lymph %   17.3         MCH   26.3         MCHC   32.1         MCV   82         Mono #   1.3         Mono %   9.6         MPV   10.5         nRBC   0         Platelet Count   203         Potassium   4.3         PROTEIN TOTAL   6.9         RBC   4.82         RDW   18.6         SARS-CoV2 (COVID-19) Qualitative PCR Not Detected           Sodium   136         WBC   13.37                 Physical Exam    Review of Systems   Respiratory:          Congestion   All other systems reviewed and are negative.

## 2024-11-19 NOTE — SUBJECTIVE & OBJECTIVE
Obstetric HPI:  Patient reports None contractions, active fetal movement, absent vaginal bleeding , absent loss of fluid      Objective:     Vital Signs (Most Recent):  Temp: 98 °F (36.7 °C) (11/19/24 0808)  Pulse: (!) 113 (11/19/24 0808)  Resp: 18 (11/19/24 0808)  BP: (!) 152/82 (11/19/24 0808)  SpO2: 98 % (11/19/24 0420) Vital Signs (24h Range):  Temp:  [98 °F (36.7 °C)-98.7 °F (37.1 °C)] 98 °F (36.7 °C)  Pulse:  [] 113  Resp:  [18-19] 18  SpO2:  [96 %-99 %] 98 %  BP: (129-157)/(82-94) 152/82        There is no height or weight on file to calculate BMI.    FHT: to be monitored later this am    No intake or output data in the 24 hours ending 11/19/24 1008    Cervical Exam:deferred     Significant Labs:  Recent Lab Results         11/18/24  2203   11/18/24  1818   11/18/24  1808        Respiratory Infection Panel Source NP Swab           Adenovirus Not Detected           Coronavirus 229E, Common Cold Virus Not Detected           Coronavirus HKU1, Common Cold Virus Not Detected           Coronavirus NL63, Common Cold Virus Not Detected           Coronavirus OC43, Common Cold Virus Not Detected  Comment: The Coronavirus strains detected in this test cause the common cold.  These strains are not the COVID-19 (novel Coronavirus)strain   associated with the respiratory disease outbreak.             Human Metapneumovirus Not Detected           Human Rhinovirus/Enterovirus Not Detected           Influenza A H1-2009 Not Detected           Influenza B Not Detected           Parainfluenza Virus 1 Not Detected           Parainfluenza Virus 2 Not Detected           Parainfluenza Virus 3 Not Detected           Parainfluenza Virus 4 Not Detected           Respiratory Syncytial Virus Not Detected           Bordetella Parapertussis ([id number]) Not Detected           Bordetella pertussis (ptxP) Not Detected           Chlamydia pneumoniae Not Detected           Mycoplasma pneumoniae Not Detected  Comment: Respiratory Infection  Panel testing performed by Multiplex PCR.           Albumin   2.8         ALP   434         ALT   17         Anion Gap   10         AST   22         Baso #   0.04         Basophil %   0.3         BILIRUBIN TOTAL   0.2  Comment: For infants and newborns, interpretation of results should be based  on gestational age, weight and in agreement with clinical  observations.    Premature Infant recommended reference ranges:  Up to 24 hours.............<8.0 mg/dL  Up to 48 hours............<12.0 mg/dL  3-5 days..................<15.0 mg/dL  6-29 days.................<15.0 mg/dL           BUN   12         Calcium   9.0         Chloride   108         CO2   18         Creatinine   0.7         D-Dimer     3.07  Comment: The quantitative D-dimer assay should be used as an aid in   the diagnosis of deep vein thrombosis and pulmonary embolism  in patients with the appropriate presentation and clinical  history. The upper limit of the reference interval and the clinical   cut off   point are identical. Causes of a positive (>0.50 mg/L FEU) D-Dimer   test  include, but are not limited to: DVT, PE, DIC, thrombolytic   therapy, anticoagulant therapy, recent surgery, trauma, or   pregnancy, disseminated malignancy, aortic aneurysm, cirrhosis,  and severe infection. False negative results may occur in   patients with distal DVT.         Differential Method   Automated         eGFR   >60         Eos #   0.0         Eos %   0.1         Glucose   88         Gran # (ANC)   9.4         Gran %   70.1         Hematocrit   39.6         Hemoglobin   12.7         Immature Grans (Abs)   0.35  Comment: Mild elevation in immature granulocytes is non specific and   can be seen in a variety of conditions including stress response,   acute inflammation, trauma and pregnancy. Correlation with other   laboratory and clinical findings is essential.           Immature Granulocytes   2.6         Lymph #   2.3         Lymph %   17.3         MCH   26.3          MCHC   32.1         MCV   82         Mono #   1.3         Mono %   9.6         MPV   10.5         nRBC   0         Platelet Count   203         Potassium   4.3         PROTEIN TOTAL   6.9         RBC   4.82         RDW   18.6         SARS-CoV2 (COVID-19) Qualitative PCR Not Detected           Sodium   136         WBC   13.37                 Physical Exam:   Constitutional: She is oriented to person, place, and time. She appears well-developed and well-nourished.    HENT:   Head: Normocephalic.    Eyes: Pupils are equal, round, and reactive to light. Conjunctivae are normal.     Cardiovascular:  Normal rate and regular rhythm.             Pulmonary/Chest: Effort normal.        Abdominal: Soft.     Genitourinary:    Genitourinary Comments: Gravid, non tender             Musculoskeletal: Normal range of motion and moves all extremeties. No edema.       Neurological: She is alert and oriented to person, place, and time. She has normal reflexes. She exhibits normal muscle tone.    Skin: Skin is warm and dry.    Psychiatric: She has a normal mood and affect. Her behavior is normal. Judgment and thought content normal.       Review of Systems

## 2024-11-19 NOTE — PROGRESS NOTES
Discussed that due to her continued tachycardia, shortness of breath, and chest pain (somewhat resolved with ativan), elevated d-dimer, normal chest Xray, next step would be to rule out thrombolytic causes. CTA P ordered to assess for pulmonary embolism. Discussed risks of radiation with patient in pregnancy. She would like to move forward with CT.       Vitals:    11/18/24 1957   BP: (!) 157/94   Pulse: (!) 124   Resp: 18   Temp: 98.7 °F (37.1 °C)         Yanet Martines MD  Obstetrics and Gynecology  [facility] [place], [place]      Recent Lab Results         11/18/24  1818   11/18/24  1808        Albumin 2.8                  ALT 17         Anion Gap 10         AST 22         Baso # 0.04         Basophil % 0.3         BILIRUBIN TOTAL 0.2  Comment: For infants and newborns, interpretation of results should be based  on gestational age, weight and in agreement with clinical  observations.    Premature Infant recommended reference ranges:  Up to 24 hours.............<8.0 mg/dL  Up to 48 hours............<12.0 mg/dL  3-5 days..................<15.0 mg/dL  6-29 days.................<15.0 mg/dL           BUN 12         Calcium 9.0         Chloride 108         CO2 18         Creatinine 0.7         D-Dimer   3.07  Comment: The quantitative D-dimer assay should be used as an aid in   the diagnosis of deep vein thrombosis and pulmonary embolism  in patients with the appropriate presentation and clinical  history. The upper limit of the reference interval and the clinical   cut off   point are identical. Causes of a positive (>0.50 mg/L FEU) D-Dimer   test  include, but are not limited to: DVT, PE, DIC, thrombolytic   therapy, anticoagulant therapy, recent surgery, trauma, or   pregnancy, disseminated malignancy, aortic aneurysm, cirrhosis,  and severe infection. False negative results may occur in   patients with distal DVT.         Differential Method Automated         eGFR >60         Eos # 0.0         Eos  % 0.1         Glucose 88         Gran # (ANC) 9.4         Gran % 70.1         Hematocrit 39.6         Hemoglobin 12.7         Immature Grans (Abs) 0.35  Comment: Mild elevation in immature granulocytes is non specific and   can be seen in a variety of conditions including stress response,   acute inflammation, trauma and pregnancy. Correlation with other   laboratory and clinical findings is essential.           Immature Granulocytes 2.6         Lymph # 2.3         Lymph % 17.3         MCH 26.3         MCHC 32.1         MCV 82         Mono # 1.3         Mono % 9.6         MPV 10.5         nRBC 0         Platelet Count 203         Potassium 4.3         PROTEIN TOTAL 6.9         RBC 4.82         RDW 18.6         Sodium 136         WBC 13.37

## 2024-11-19 NOTE — PROGRESS NOTES
[facility] ([facility])  Obstetrics  Antepartum Progress Note    Patient Name: Maria De Jesus Payne  MRN: [id number]  Admission Date: 11/13/2024  Hospital Length of Stay: 1 days  Attending Physician: Jasson Pineda MD  Primary Care Provider: Abena, Primary Doctor    Subjective:     Principal Problem:Severe pre-eclampsia in third trimester    HPI:  18 yo G1 with IUP at 32w0d.  Current pregnancy complicated by GHTN.  Pt was sent from clinic secondary to elevated BP in clinic.  Pt denies headaches or vision changes on admission.  Has noted some swelling.  Otherwise no complaints.    Hospital Course:  Pt admitted to OB service with preeclampsia.  Urine P/C ratio 0.53.  Labs wnl.  Ultrasound 11/13/24 consistent with IUGR (EFW 1480 g at the 10th%, AC 3rd%), UA doppler wnl  BMZ on 11/13 and 11/14 11/14/24: HD#2, asymptomatic.  BP wnl.  Pt to complete BMZ series today  11/15/24:  Blood pressures elevated to severe range.  Magnesium sulfate given.  11/16/24: Blood pressures normal to mild range today. Episode of shortness of breath, fatigue, diminished reflexes, mag stopped, Mag level 5.8, restarted magnesium at 1g/hr  11/17/24 - Procardia XL 30 mg BID initiated for blood pressure control.  Blood pressures stable.  Patient asymptomatic.  11/18/24: BP stable on current regimen. JAD 12.8. MVP 4.9 cm. Breech presentation. No evidence of previa. Anterior placenta. Biophysical profile score 8/8   11/19/24--tacchycardia, shortness of breath and chest pain, neg CXR, elevated d dimer, neg resp panel, neg CTA-p; small pleural effusion; chest pain improved with ativan;     Obstetric HPI:  Patient reports None contractions, active fetal movement, absent vaginal bleeding , absent loss of fluid . Denies preE symptoms, denies chest pain; c/o sinus congestion     Objective:     Vital Signs (Most Recent):  Temp: 98 °F (36.7 °C) (11/19/24 0808)  Pulse: (!) 113 (11/19/24 0808)  Resp: 18 (11/19/24 0808)  BP: (!) 152/82 (11/19/24  0808)  SpO2: 98 % (11/19/24 0420) Vital Signs (24h Range):  Temp:  [98 °F (36.7 °C)-98.7 °F (37.1 °C)] 98 °F (36.7 °C)  Pulse:  [] 113  Resp:  [18-19] 18  SpO2:  [96 %-99 %] 98 %  BP: (129-157)/(82-94) 152/82        There is no height or weight on file to calculate BMI.  Fht:  to be monitored later today  No intake or output data in the 24 hours ending 11/19/24 1003    Cervix deferred    Significant Labs:  Recent Lab Results         11/18/24  2203   11/18/24  1818   11/18/24  1808        Respiratory Infection Panel Source NP Swab           Adenovirus Not Detected           Coronavirus 229E, Common Cold Virus Not Detected           Coronavirus HKU1, Common Cold Virus Not Detected           Coronavirus NL63, Common Cold Virus Not Detected           Coronavirus OC43, Common Cold Virus Not Detected  Comment: The Coronavirus strains detected in this test cause the common cold.  These strains are not the COVID-19 (novel Coronavirus)strain   associated with the respiratory disease outbreak.             Human Metapneumovirus Not Detected           Human Rhinovirus/Enterovirus Not Detected           Influenza A H1-2009 Not Detected           Influenza B Not Detected           Parainfluenza Virus 1 Not Detected           Parainfluenza Virus 2 Not Detected           Parainfluenza Virus 3 Not Detected           Parainfluenza Virus 4 Not Detected           Respiratory Syncytial Virus Not Detected           Bordetella Parapertussis (GL3764) Not Detected           Bordetella pertussis (ptxP) Not Detected           Chlamydia pneumoniae Not Detected           Mycoplasma pneumoniae Not Detected  Comment: Respiratory Infection Panel testing performed by Multiplex PCR.           Albumin   2.8         ALP   434         ALT   17         Anion Gap   10         AST   22         Baso #   0.04         Basophil %   0.3         BILIRUBIN TOTAL   0.2  Comment: For infants and newborns, interpretation of results should be based  on  gestational age, weight and in agreement with clinical  observations.    Premature Infant recommended reference ranges:  Up to 24 hours.............<8.0 mg/dL  Up to 48 hours............<12.0 mg/dL  3-5 days..................<15.0 mg/dL  6-29 days.................<15.0 mg/dL           BUN   12         Calcium   9.0         Chloride   108         CO2   18         Creatinine   0.7         D-Dimer     3.07  Comment: The quantitative D-dimer assay should be used as an aid in   the diagnosis of deep vein thrombosis and pulmonary embolism  in patients with the appropriate presentation and clinical  history. The upper limit of the reference interval and the clinical   cut off   point are identical. Causes of a positive (>0.50 mg/L FEU) D-Dimer   test  include, but are not limited to: DVT, PE, DIC, thrombolytic   therapy, anticoagulant therapy, recent surgery, trauma, or   pregnancy, disseminated malignancy, aortic aneurysm, cirrhosis,  and severe infection. False negative results may occur in   patients with distal DVT.         Differential Method   Automated         eGFR   >60         Eos #   0.0         Eos %   0.1         Glucose   88         Gran # (ANC)   9.4         Gran %   70.1         Hematocrit   39.6         Hemoglobin   12.7         Immature Grans (Abs)   0.35  Comment: Mild elevation in immature granulocytes is non specific and   can be seen in a variety of conditions including stress response,   acute inflammation, trauma and pregnancy. Correlation with other   laboratory and clinical findings is essential.           Immature Granulocytes   2.6         Lymph #   2.3         Lymph %   17.3         MCH   26.3         MCHC   32.1         MCV   82         Mono #   1.3         Mono %   9.6         MPV   10.5         nRBC   0         Platelet Count   203         Potassium   4.3         PROTEIN TOTAL   6.9         RBC   4.82         RDW   18.6         SARS-CoV2 (COVID-19) Qualitative PCR Not Detected            Sodium   136         WBC   13.37                 Physical Exam    Review of Systems   Respiratory:          Congestion   All other systems reviewed and are negative.    Assessment/Plan:     19 y.o. female  at 32w6d for:    * Severe pre-eclampsia in third trimester  -Urine P/C ratio 0.53 on   - S/P magnesium sulfate x 24 hours.  - Blood pressures well controlled on procardia XL 30 mg BID  - BMZ series complete  - In the setting of preeclampsia with severe features and IUGR, will plan inpatient surveillance until delivery with weekly labs (q Monday), BPP q Monday/Thursday, and UA Dopplers q Monday.  Plan delivery at 34 weeks EGA or sooner as indicated for maternal or fetal indications.  2024  Denies preE symtpoms  Bp stable on procardia 30 bid  S/p magnesium sulfate; s/p bmz series  Continue nst bid  Bpp due on Thursday; u/a with dopplers on Monday  Repeat labs on Monday  Delivery at 34 wks or sooner for maternal or fetal indications      Nasal sinus congestion  2024  Add zyrtec and flonase    Tachycardia  2024  Negative EKG, chest xray, cta    Poor fetal growth affecting management of mother in third trimester  US on  - 1480 g (10%), AC 3%, BPP 8/8, MVP 7.6, S:D nL, cephalic  BPP 2x/week with UA dopplers weekly  Growth u/s q 3 weeks          Sparkle Mclain MD  Obstetrics  [facility] ([facility])

## 2024-11-19 NOTE — PROGRESS NOTES
Into patient room. Report of chest pain, SOB. She also had rib pain when walking to the restroom. She has been tachy intermittently today. At this time she is clinically stable.       Physical Exam:   General: comfortable, in no acute distress  Cardiovascular: well perfused   Pulmonary: easy work of breathing   Abdominal: non-distended, soft, non-tender, no rebound, no guarding  Extremities: no edema, swelling (left upper extremity, IV infiltration), lower extremity SCDs on and in place, no lower extremity edema  Neurologic: cranial nerves II-XII grossly intact  Psychologic: alert, appropriate mood and affect       - EKG, CBC, CMP, d-dimer, CXR  - Respiratory panel   - Ativan PRN    Will follow up results.    Yanet Martines MD  Obstetrics and Gynecology  [facility] [place], [place]

## 2024-11-20 ENCOUNTER — TELEPHONE (OUTPATIENT)
Dept: OBSTETRICS AND GYNECOLOGY | Facility: CLINIC | Age: 19
End: 2024-11-20
Payer: MEDICAID

## 2024-11-20 PROCEDURE — 11000001 HC ACUTE MED/SURG PRIVATE ROOM

## 2024-11-20 PROCEDURE — 99232 SBSQ HOSP IP/OBS MODERATE 35: CPT | Mod: ,,, | Performed by: OBSTETRICS & GYNECOLOGY

## 2024-11-20 PROCEDURE — 59025 FETAL NON-STRESS TEST: CPT

## 2024-11-20 PROCEDURE — 25000003 PHARM REV CODE 250: Performed by: OBSTETRICS & GYNECOLOGY

## 2024-11-20 RX ADMIN — CETIRIZINE HYDROCHLORIDE 5 MG: 5 TABLET ORAL at 08:11

## 2024-11-20 RX ADMIN — NIFEDIPINE 30 MG: 30 TABLET, FILM COATED, EXTENDED RELEASE ORAL at 09:11

## 2024-11-20 RX ADMIN — NIFEDIPINE 30 MG: 30 TABLET, FILM COATED, EXTENDED RELEASE ORAL at 08:11

## 2024-11-20 RX ADMIN — PRENATAL VITAMINS-IRON FUMARATE 27 MG IRON-FOLIC ACID 0.8 MG TABLET 1 TABLET: at 08:11

## 2024-11-20 RX ADMIN — FLUTICASONE PROPIONATE 100 MCG: 50 SPRAY, METERED NASAL at 08:11

## 2024-11-20 NOTE — PLAN OF CARE
No complaints of chest pain, SOB, or chest palpitations this shift. Incentive spirometer started this shift.    Problem: Adult Inpatient Plan of Care  Goal: Plan of Care Review  Outcome: Progressing  Goal: Patient-Specific Goal (Individualized)  Outcome: Progressing  Goal: Absence of Hospital-Acquired Illness or Injury  Outcome: Progressing  Goal: Optimal Comfort and Wellbeing  Outcome: Progressing  Goal: Readiness for Transition of Care  Outcome: Progressing     Problem:  Fall Injury Risk  Goal: Absence of Fall, Infant Drop and Related Injury  Outcome: Progressing     Problem: Hospitalized  Patient  Goal: Optimal Patient-Fetal Wellbeing  Outcome: Progressing     Problem: Skin Injury Risk Increased  Goal: Skin Health and Integrity  Outcome: Progressing     Problem: Hypertensive Disorders in Pregnancy  Goal: Patient-Fetal Stabilization  Outcome: Progressing

## 2024-11-20 NOTE — NURSING
"Attempted to administer 325mg iron pill to patient. Patient stated "her body rejects it" so she takes her own iron gummies she bought OTC. Patient states "I take 2 gummies once a day in the morning" Iron gummies noted to be 9mg per gummy.  Iron charted against in the MAR. MD notified.  "

## 2024-11-20 NOTE — PROGRESS NOTES
[facility] ([facility])  Obstetrics  Antepartum Progress Note    Patient Name: Maria De Jesus Payne  MRN: [id number]  Admission Date: 11/13/2024  Hospital Length of Stay: 2 days  Attending Physician: Jasson Pineda MD  Primary Care Provider: Abena, Primary Doctor    Subjective:     Principal Problem:Severe pre-eclampsia in third trimester    HPI:  20 yo G1 with IUP at 32w0d.  Current pregnancy complicated by GHTN.  Pt was sent from clinic secondary to elevated BP in clinic.  Pt denies headaches or vision changes on admission.  Has noted some swelling.  Otherwise no complaints.    Hospital Course:  Pt admitted to OB service with preeclampsia.  Urine P/C ratio 0.53.  Labs wnl.  Ultrasound 11/13/24 consistent with IUGR (EFW 1480 g at the 10th%, AC 3rd%), UA doppler wnl  BMZ on 11/13 and 11/14 11/14/24: HD#2, asymptomatic.  BP wnl.  Pt to complete BMZ series today  11/15/24:  Blood pressures elevated to severe range.  Magnesium sulfate given.  11/16/24: Blood pressures normal to mild range today. Episode of shortness of breath, fatigue, diminished reflexes, mag stopped, Mag level 5.8, restarted magnesium at 1g/hr  11/17/24 - Procardia XL 30 mg BID initiated for blood pressure control.  Blood pressures stable.  Patient asymptomatic.  11/18/24: BP stable on current regimen. JAD 12.8. MVP 4.9 cm. Breech presentation. No evidence of previa. Anterior placenta. Biophysical profile score 8/8   11/19/24--tacchycardia, shortness of breath and chest pain, neg CXR, elevated d dimer, neg resp panel, neg CTA-p; small pleural effusion; chest pain improved with ativan;     Obstetric HPI:  Patient reports None contractions, active fetal movement, absent vaginal bleeding , absent loss of fluid.  Pt reports she is doing well this AM but still with some congestion.  Denies headaches or vision changes.       Objective:     Vital Signs (Most Recent):  Temp: 98.5 °F (36.9 °C) (11/20/24 0821)  Pulse: 101 (11/20/24 0821)  Resp: 18  (24 0821)  BP: 125/89 (24 0821)  SpO2: 98 % (24 0421) Vital Signs (24h Range):  Temp:  [98 °F (36.7 °C)-98.6 °F (37 °C)] 98.5 °F (36.9 °C)  Pulse:  [] 101  Resp:  [18] 18  SpO2:  [97 %-98 %] 98 %  BP: (125-149)/(84-96) 125/89        There is no height or weight on file to calculate BMI.    FHT: Cat 1 (reassuring)  TOCO:     No intake or output data in the 24 hours ending 24 1049      Significant Labs:  Recent Lab Results       None            Physical Exam:   Constitutional: She is oriented to person, place, and time. She appears well-developed and well-nourished. No distress.       Cardiovascular:  Normal rate, regular rhythm and normal heart sounds.             Pulmonary/Chest: Effort normal and breath sounds normal.        Abdominal: Soft. Bowel sounds are normal. She exhibits no distension. There is no abdominal tenderness.             Musculoskeletal: Normal range of motion and moves all extremeties. No tenderness or edema.       Neurological: She is alert and oriented to person, place, and time.    Skin: Skin is warm and dry.    Psychiatric: She has a normal mood and affect. Her behavior is normal. Thought content normal.       Review of Systems  Assessment/Plan:     19 y.o. female  at 33w0d for:    * Severe pre-eclampsia in third trimester  -Urine P/C ratio 0.53 on   - S/P magnesium sulfate x 24 hours.  - Blood pressures well controlled on procardia XL 30 mg BID  - BMZ series complete  - In the setting of preeclampsia with severe features and IUGR, will plan inpatient surveillance until delivery with weekly labs (q Monday), BPP q Monday/Thursday, and UA Dopplers q Monday.  Plan delivery at 34 weeks EGA or sooner as indicated for maternal or fetal indications.  2024  HD # 8  Denies preE symtpoms  Bp stable on procardia 30 bid  S/p magnesium sulfate; s/p bmz series  Continue nst bid  Bpp due on Thursday; u/a with dopplers on Monday  Repeat labs on Monday  Delivery  at 34 wks or sooner for maternal or fetal indications      Nasal sinus congestion  11/19/2024  Add zyrtec and flonase    Tachycardia  11/19/2024  Negative EKG, chest xray, cta    Poor fetal growth affecting management of mother in third trimester  US on 11/13 - 1480 g (10%), AC 3%, BPP 8/8, MVP 7.6, S:D nL, cephalic  BPP 2x/week with UA dopplers weekly  Growth u/s q 3 weeks          Jasson Pineda MD  Obstetrics  [facility] ([facility])

## 2024-11-20 NOTE — SUBJECTIVE & OBJECTIVE
Obstetric HPI:  Patient reports None contractions, active fetal movement, absent vaginal bleeding , absent loss of fluid.  Pt reports she is doing well this AM but still with some congestion.  Denies headaches or vision changes.       Objective:     Vital Signs (Most Recent):  Temp: 98.5 °F (36.9 °C) (11/20/24 0821)  Pulse: 101 (11/20/24 0821)  Resp: 18 (11/20/24 0821)  BP: 125/89 (11/20/24 0821)  SpO2: 98 % (11/20/24 0421) Vital Signs (24h Range):  Temp:  [98 °F (36.7 °C)-98.6 °F (37 °C)] 98.5 °F (36.9 °C)  Pulse:  [] 101  Resp:  [18] 18  SpO2:  [97 %-98 %] 98 %  BP: (125-149)/(84-96) 125/89        There is no height or weight on file to calculate BMI.    FHT: Cat 1 (reassuring)  TOCO:     No intake or output data in the 24 hours ending 11/20/24 1049      Significant Labs:  Recent Lab Results       None            Physical Exam:   Constitutional: She is oriented to person, place, and time. She appears well-developed and well-nourished. No distress.       Cardiovascular:  Normal rate, regular rhythm and normal heart sounds.             Pulmonary/Chest: Effort normal and breath sounds normal.        Abdominal: Soft. Bowel sounds are normal. She exhibits no distension. There is no abdominal tenderness.             Musculoskeletal: Normal range of motion and moves all extremeties. No tenderness or edema.       Neurological: She is alert and oriented to person, place, and time.    Skin: Skin is warm and dry.    Psychiatric: She has a normal mood and affect. Her behavior is normal. Thought content normal.       Review of Systems

## 2024-11-20 NOTE — ASSESSMENT & PLAN NOTE
-Urine P/C ratio 0.53 on 11/13  - S/P magnesium sulfate x 24 hours.  - Blood pressures well controlled on procardia XL 30 mg BID  - BMZ series complete  - In the setting of preeclampsia with severe features and IUGR, will plan inpatient surveillance until delivery with weekly labs (q Monday), BPP q Monday/Thursday, and UA Dopplers q Monday.  Plan delivery at 34 weeks EGA or sooner as indicated for maternal or fetal indications.  11/20/2024  HD # 8  Denies preE symtpoms  Bp stable on procardia 30 bid  S/p magnesium sulfate; s/p bmz series  Continue nst bid  Bpp due on Thursday; u/a with dopplers on Monday  Repeat labs on Monday  Delivery at 34 wks or sooner for maternal or fetal indications

## 2024-11-20 NOTE — PLAN OF CARE
POC reviewed with patient. Denies any loss of fluid vaginally, denies contractions/abdominal pain. Voids spontaneously. Ambulates independently. Patient denies any pain. Denies SOB, chest pain, dizziness/vision changes. BP elevated but within parameters for current treatment regimen; other VSS. Speaks positively of pregnancy; reports good fetal movement. NST BID. Call light placed within patient reach, encouraged use if needed.

## 2024-11-21 LAB
OHS QRS DURATION: 68 MS
OHS QRS DURATION: 72 MS
OHS QTC CALCULATION: 422 MS
OHS QTC CALCULATION: 436 MS

## 2024-11-21 PROCEDURE — 99232 SBSQ HOSP IP/OBS MODERATE 35: CPT | Mod: ,,, | Performed by: OBSTETRICS & GYNECOLOGY

## 2024-11-21 PROCEDURE — 11000001 HC ACUTE MED/SURG PRIVATE ROOM

## 2024-11-21 PROCEDURE — 25000003 PHARM REV CODE 250: Performed by: OBSTETRICS & GYNECOLOGY

## 2024-11-21 PROCEDURE — 99900035 HC TECH TIME PER 15 MIN (STAT)

## 2024-11-21 PROCEDURE — 59025 FETAL NON-STRESS TEST: CPT

## 2024-11-21 RX ADMIN — CETIRIZINE HYDROCHLORIDE 5 MG: 5 TABLET ORAL at 09:11

## 2024-11-21 RX ADMIN — NIFEDIPINE 30 MG: 30 TABLET, FILM COATED, EXTENDED RELEASE ORAL at 09:11

## 2024-11-21 RX ADMIN — NIFEDIPINE 30 MG: 30 TABLET, FILM COATED, EXTENDED RELEASE ORAL at 08:11

## 2024-11-21 RX ADMIN — FLUTICASONE PROPIONATE 100 MCG: 50 SPRAY, METERED NASAL at 09:11

## 2024-11-21 RX ADMIN — PRENATAL VITAMINS-IRON FUMARATE 27 MG IRON-FOLIC ACID 0.8 MG TABLET 1 TABLET: at 09:11

## 2024-11-21 NOTE — SUBJECTIVE & OBJECTIVE
Obstetric HPI:  Patient reports None contractions, active fetal movement, absent vaginal bleeding , absent loss of fluid   No HA, change in vision, or chest pain.  Feels like she can't take as deep of a breath b/c of the baby's position but otherwise no SOB.  No epigastric or RUQ pain     Objective:     Vital Signs (Most Recent):  Temp: 98.7 °F (37.1 °C) (11/21/24 1135)  Pulse: 109 (11/21/24 1135)  Resp: 16 (11/21/24 1135)  BP: (!) 138/94 (11/21/24 1135)  SpO2: 100 % (11/21/24 1135) Vital Signs (24h Range):  Temp:  [97.9 °F (36.6 °C)-99.6 °F (37.6 °C)] 98.7 °F (37.1 °C)  Pulse:  [] 109  Resp:  [14-18] 16  SpO2:  [95 %-100 %] 100 %  BP: (131-142)/(81-94) 138/94        There is no height or weight on file to calculate BMI.        No intake or output data in the 24 hours ending 11/21/24 1148         Significant Labs:  Recent Lab Results       None            Physical Exam:   Constitutional: She is oriented to person, place, and time. She appears well-developed and well-nourished. No distress.    HENT:   Head: Normocephalic and atraumatic.     Neck: No thyromegaly present.     Pulmonary/Chest: Effort normal.        Abdominal: Soft. She exhibits no distension and no mass. There is no abdominal tenderness. There is no rebound and no guarding.   Uterus gravid, soft, and non-tender             Musculoskeletal: Edema (1+ BL LE edema and facial edema) present.       Neurological: She is alert and oriented to person, place, and time. She displays normal reflexes (DTR's 2+ BL).    Skin: No rash noted.    Psychiatric: She has a normal mood and affect. Her behavior is normal. Judgment and thought content normal.       Review of Systems

## 2024-11-21 NOTE — ASSESSMENT & PLAN NOTE
Breech presentation noted on US  and today  Reviewed this with the patient and her s/o  Discussed need for  for delivery if baby remains breech. They expressed understanding

## 2024-11-21 NOTE — NURSING
Notified dr lopez of elevated bp. Per orders rechecked twice 20 minutes apart. BP returned to normal limits. No new orders at this time.

## 2024-11-21 NOTE — CARE UPDATE
Patient had 3 consecutive BP readings of 130/90, 130/90, and 134/91. Dr. Pineda was contacted and I was advised that no further protocol are new orders will be ordered due to B/P readings did not need any further protocol advised by Dr. Pineda.

## 2024-11-21 NOTE — ASSESSMENT & PLAN NOTE
-Urine P/C ratio 0.53 on 11/13  - S/P magnesium sulfate x 24 hours.  - Blood pressures well controlled on procardia XL 30 mg BID  - BMZ series complete  - In the setting of preeclampsia with severe features and IUGR, will plan inpatient surveillance until delivery with weekly labs (q Monday), BPP q Monday/Thursday, and UA Dopplers q Monday.  Plan delivery at 34 weeks EGA or sooner as indicated for maternal or fetal indications.  11/21/2024  HD #9  Denies preE symtpoms  Bp stable on procardia 30 bid  S/p magnesium sulfate; s/p bmz series  Continue nst bid  Bpp due on Thursday; u/a with dopplers on Monday  Repeat labs on Monday  Delivery at 34 wks or sooner for maternal or fetal indications

## 2024-11-21 NOTE — PROGRESS NOTES
[facility] ([facility])  Obstetrics  Antepartum Progress Note    Patient Name: Maria De Jesus Payne  MRN: [id number]  Admission Date: 11/13/2024  Hospital Length of Stay: 3 days  Attending Physician: Jasson Pineda MD  Primary Care Provider: Abena, Primary Doctor    Subjective:     Principal Problem:Severe pre-eclampsia in third trimester    HPI:  20 yo G1 with IUP at 32w0d.  Current pregnancy complicated by GHTN.  Pt was sent from clinic secondary to elevated BP in clinic.  Pt denies headaches or vision changes on admission.  Has noted some swelling.  Otherwise no complaints.    Hospital Course:  Pt admitted to OB service with preeclampsia.  Urine P/C ratio 0.53.  Labs wnl.  Ultrasound 11/13/24 consistent with IUGR (EFW 1480 g at the 10th%, AC 3rd%), UA doppler wnl  BMZ on 11/13 and 11/14 11/14/24: HD#2, asymptomatic.  BP wnl.  Pt to complete BMZ series today  11/15/24:  Blood pressures elevated to severe range.  Magnesium sulfate given.  11/16/24: Blood pressures normal to mild range today. Episode of shortness of breath, fatigue, diminished reflexes, mag stopped, Mag level 5.8, restarted magnesium at 1g/hr  11/17/24 - Procardia XL 30 mg BID initiated for blood pressure control.  Blood pressures stable.  Patient asymptomatic.  11/18/24: BP stable on current regimen. JAD 12.8. MVP 4.9 cm. Breech presentation. No evidence of previa. Anterior placenta. Biophysical profile score 8/8   11/19/24--tacchycardia, shortness of breath and chest pain, neg CXR, elevated d dimer, neg resp panel, neg CTA-p; small pleural effusion; chest pain improved with ativan;   11/21/24--BPP 8/8, breech, umbilical artery dopplers wnl    Obstetric HPI:  Patient reports None contractions, active fetal movement, absent vaginal bleeding , absent loss of fluid   No HA, change in vision, or chest pain.  Feels like she can't take as deep of a breath b/c of the baby's position but otherwise no SOB.  No epigastric or RUQ pain      Objective:     Vital Signs (Most Recent):  Temp: 98.7 °F (37.1 °C) (24 1135)  Pulse: 109 (24 1135)  Resp: 16 (24 1135)  BP: (!) 138/94 (24 1135)  SpO2: 100 % (24 1135) Vital Signs (24h Range):  Temp:  [97.9 °F (36.6 °C)-99.6 °F (37.6 °C)] 98.7 °F (37.1 °C)  Pulse:  [] 109  Resp:  [14-18] 16  SpO2:  [95 %-100 %] 100 %  BP: (131-142)/(81-94) 138/94        There is no height or weight on file to calculate BMI.        No intake or output data in the 24 hours ending 24 1148         Significant Labs:  Recent Lab Results       None            Physical Exam:   Constitutional: She is oriented to person, place, and time. She appears well-developed and well-nourished. No distress.    HENT:   Head: Normocephalic and atraumatic.     Neck: No thyromegaly present.     Pulmonary/Chest: Effort normal.        Abdominal: Soft. She exhibits no distension and no mass. There is no abdominal tenderness. There is no rebound and no guarding.   Uterus gravid, soft, and non-tender             Musculoskeletal: Edema (1+ BL LE edema and facial edema) present.       Neurological: She is alert and oriented to person, place, and time. She displays normal reflexes (DTR's 2+ BL).    Skin: No rash noted.    Psychiatric: She has a normal mood and affect. Her behavior is normal. Judgment and thought content normal.       Review of Systems  Assessment/Plan:     19 y.o. female  at 33w1d for:    * Severe pre-eclampsia in third trimester  -Urine P/C ratio 0.53 on   - S/P magnesium sulfate x 24 hours.  - Blood pressures well controlled on procardia XL 30 mg BID  - BMZ series complete  - In the setting of preeclampsia with severe features and IUGR, will plan inpatient surveillance until delivery with weekly labs (q Monday), BPP q Monday/Thursday, and UA Dopplers q Monday.  Plan delivery at 34 weeks EGA or sooner as indicated for maternal or fetal indications.  2024  HD #9  Denies preE symtpoms  Bp  stable on procardia 30 bid  S/p magnesium sulfate; s/p bmz series  Continue nst bid  Bpp due on Thursday; u/a with dopplers on Monday  Repeat labs on Monday  Delivery at 34 wks or sooner for maternal or fetal indications      Breech presentation  Breech presentation noted on US  and today  Reviewed this with the patient and her s/o  Discussed need for  for delivery if baby remains breech. They expressed understanding    Nasal sinus congestion  2024  Add zyrtec and flonase    Tachycardia  2024  Negative EKG, chest xray, cta    Poor fetal growth affecting management of mother in third trimester  US on  - 1480 g (10%), AC 3%, BPP 8/8, MVP 7.6, S:D nL, cephalic  BPP 2x/week with UA dopplers weekly  Growth u/s q 3 weeks          Ashley Richmond MD  Obstetrics  [facility] ([facility])

## 2024-11-22 PROCEDURE — 11000001 HC ACUTE MED/SURG PRIVATE ROOM

## 2024-11-22 PROCEDURE — 25000003 PHARM REV CODE 250: Performed by: OBSTETRICS & GYNECOLOGY

## 2024-11-22 PROCEDURE — 99232 SBSQ HOSP IP/OBS MODERATE 35: CPT | Mod: ,,, | Performed by: OBSTETRICS & GYNECOLOGY

## 2024-11-22 PROCEDURE — 99900035 HC TECH TIME PER 15 MIN (STAT)

## 2024-11-22 RX ADMIN — FLUTICASONE PROPIONATE 100 MCG: 50 SPRAY, METERED NASAL at 09:11

## 2024-11-22 RX ADMIN — CETIRIZINE HYDROCHLORIDE 5 MG: 5 TABLET ORAL at 08:11

## 2024-11-22 RX ADMIN — NIFEDIPINE 30 MG: 30 TABLET, FILM COATED, EXTENDED RELEASE ORAL at 08:11

## 2024-11-22 RX ADMIN — PRENATAL VITAMINS-IRON FUMARATE 27 MG IRON-FOLIC ACID 0.8 MG TABLET 1 TABLET: at 08:11

## 2024-11-22 NOTE — PLAN OF CARE
Patient afebrile and had no falls this shift. Uterus gravid. Fetus is active. F.H.R 144 bpm. N.S.T done per orders. No bleeding or leaking of fluids per vagina. Voids spontaneously. Ambulates independently. No pain or concerns voiced. Medicated as per orders. Vital signs stable at this time. Will continue to monitor.      Problem: Adult Inpatient Plan of Care  Goal: Plan of Care Review  Outcome: Progressing  Goal: Patient-Specific Goal (Individualized)  Outcome: Progressing  Goal: Absence of Hospital-Acquired Illness or Injury  Outcome: Progressing  Goal: Optimal Comfort and Wellbeing  Outcome: Progressing  Goal: Readiness for Transition of Care  Outcome: Progressing     Problem:  Fall Injury Risk  Goal: Absence of Fall, Infant Drop and Related Injury  Outcome: Progressing     Problem: Hospitalized  Patient  Goal: Optimal Patient-Fetal Wellbeing  Outcome: Progressing     Problem: Skin Injury Risk Increased  Goal: Skin Health and Integrity  Outcome: Progressing     Problem: Hypertensive Disorders in Pregnancy  Goal: Patient-Fetal Stabilization  Outcome: Progressing

## 2024-11-22 NOTE — SUBJECTIVE & OBJECTIVE
Obstetric HPI:  Patient reports no contractions, active fetal movement, no vaginal bleeding, no loss of fluid   No headache, visual changes, or abdominal pain.     Objective:     Vital Signs (Most Recent):  Temp: 97.8 °F (36.6 °C) (11/22/24 0815)  Pulse: (!) 111 (11/22/24 0815)  Resp: 16 (11/22/24 0815)  BP: (!) 137/99 (11/22/24 0815)  SpO2: 98 % (11/22/24 0815) Vital Signs (24h Range):  Temp:  [97.8 °F (36.6 °C)-98.8 °F (37.1 °C)] 97.8 °F (36.6 °C)  Pulse:  [] 111  Resp:  [16-18] 16  SpO2:  [98 %-100 %] 98 %  BP: (125-167)/() 137/99        There is no height or weight on file to calculate BMI.    FHT: 150  Cat 1 (reassuring)  TOCO:  Q 0 minutes    No intake or output data in the 24 hours ending 11/22/24 1035         Significant Labs:  Recent Lab Results       None            Physical Exam:   Constitutional: She is oriented to person, place, and time. No distress.    HENT:   Head: Normocephalic and atraumatic.      Cardiovascular:  Normal rate.             Pulmonary/Chest: Effort normal.        Abdominal: Soft. There is no abdominal tenderness.             Musculoskeletal: No tenderness or edema.       Neurological: She is alert and oriented to person, place, and time.     Psychiatric: She has a normal mood and affect.

## 2024-11-22 NOTE — NURSING
Patient afebrile and had no falls this shift. No leaking or bleeding this shift. Voids spontaneously. No complaints of headache, blurry vision this shift.

## 2024-11-22 NOTE — PROGRESS NOTES
[facility] ([facility])  Obstetrics  Antepartum Progress Note    Patient Name: Maria D eJesus Payne  MRN: [id number]  Admission Date: 11/13/2024  Hospital Length of Stay: 4 days  Attending Physician: Jasson Pineda MD  Primary Care Provider: Abena, Primary Doctor    Subjective:     Principal Problem:Severe pre-eclampsia in third trimester    HPI:  20 yo G1 with IUP at 32w0d.  Current pregnancy complicated by GHTN.  Pt was sent from clinic secondary to elevated BP in clinic.  Pt denies headaches or vision changes on admission.  Has noted some swelling.  Otherwise no complaints.    Hospital Course:  Pt admitted to OB service with preeclampsia.  Urine P/C ratio 0.53.  Labs wnl.  Ultrasound 11/13/24 consistent with IUGR (EFW 1480 g at the 10th%, AC 3rd%), UA doppler wnl  BMZ on 11/13 and 11/14 11/14/24: HD#2, asymptomatic.  BP wnl.  Pt to complete BMZ series today  11/15/24:  Blood pressures elevated to severe range.  Magnesium sulfate given.  11/16/24: Blood pressures normal to mild range today. Episode of shortness of breath, fatigue, diminished reflexes, mag stopped, Mag level 5.8, restarted magnesium at 1g/hr  11/17/24 - Procardia XL 30 mg BID initiated for blood pressure control.  Blood pressures stable.  Patient asymptomatic.  11/18/24: BP stable on current regimen. JAD 12.8. MVP 4.9 cm. Breech presentation. No evidence of previa. Anterior placenta. Biophysical profile score 8/8   11/19/24--tacchycardia, shortness of breath and chest pain, neg CXR, elevated d dimer, neg resp panel, neg CTA-p; small pleural effusion; chest pain improved with ativan;   11/21/24--BPP 8/8, breech, umbilical artery dopplers wnl    Obstetric HPI:  Patient reports no contractions, active fetal movement, no vaginal bleeding, no loss of fluid   No headache, visual changes, or abdominal pain.     Objective:     Vital Signs (Most Recent):  Temp: 97.8 °F (36.6 °C) (11/22/24 0815)  Pulse: (!) 111 (11/22/24 0815)  Resp: 16 (11/22/24  0815)  BP: (!) 137/99 (24 0815)  SpO2: 98 % (24 0815) Vital Signs (24h Range):  Temp:  [97.8 °F (36.6 °C)-98.8 °F (37.1 °C)] 97.8 °F (36.6 °C)  Pulse:  [] 111  Resp:  [16-18] 16  SpO2:  [98 %-100 %] 98 %  BP: (125-167)/() 137/99        There is no height or weight on file to calculate BMI.    FHT: 150  Cat 1 (reassuring)  TOCO:  Q 0 minutes    No intake or output data in the 24 hours ending 24 1035         Significant Labs:  Recent Lab Results       None            Physical Exam:   Constitutional: She is oriented to person, place, and time. No distress.    HENT:   Head: Normocephalic and atraumatic.      Cardiovascular:  Normal rate.             Pulmonary/Chest: Effort normal.        Abdominal: Soft. There is no abdominal tenderness.             Musculoskeletal: No tenderness or edema.       Neurological: She is alert and oriented to person, place, and time.     Psychiatric: She has a normal mood and affect.         Assessment/Plan:     19 y.o. female  at 33w2d for:    * Severe pre-eclampsia in third trimester  -Urine P/C ratio 0.53 on   - S/P magnesium sulfate x 24 hours.  - Blood pressures well controlled on procardia XL 30 mg BID  - BMZ series complete  - In the setting of preeclampsia with severe features and IUGR, will plan inpatient surveillance until delivery with weekly labs (q Monday), BPP q Monday/Thursday, and UA Dopplers q Monday.  Plan delivery at 34 weeks EGA or sooner as indicated for maternal or fetal indications.  2024  HD #10  Denies preE symtpoms  Bp stable on procardia 30 bid  S/p magnesium sulfate; s/p bmz series  Continue nst bid  Bpp due on Thursday; u/a with dopplers on Monday  Repeat labs on Monday  Delivery at 34 wks or sooner for maternal or fetal indications      Breech presentation  Breech presentation noted on US  and   Reviewed this with the patient and her s/o  Discussed need for  for delivery if baby remains breech.  They expressed understanding    Nasal sinus congestion  11/22/2024  Add zyrtec and flonase    Tachycardia  11/22/2024  Negative EKG, chest xray, cta    Poor fetal growth affecting management of mother in third trimester  US on 11/13 - 1480 g (10%), AC 3%, BPP 8/8, MVP 7.6, S:D nL, cephalic  BPP 2x/week with UA dopplers weekly  Growth u/s q 3 weeks          Sunni Payne MD  Obstetrics  [facility] ([facility])

## 2024-11-22 NOTE — ASSESSMENT & PLAN NOTE
-Urine P/C ratio 0.53 on 11/13  - S/P magnesium sulfate x 24 hours.  - Blood pressures well controlled on procardia XL 30 mg BID  - BMZ series complete  - In the setting of preeclampsia with severe features and IUGR, will plan inpatient surveillance until delivery with weekly labs (q Monday), BPP q Monday/Thursday, and UA Dopplers q Monday.  Plan delivery at 34 weeks EGA or sooner as indicated for maternal or fetal indications.  11/22/2024  HD #10  Denies preE symtpoms  Bp stable on procardia 30 bid  S/p magnesium sulfate; s/p bmz series  Continue nst bid  Bpp due on Thursday; u/a with dopplers on Monday  Repeat labs on Monday  Delivery at 34 wks or sooner for maternal or fetal indications

## 2024-11-22 NOTE — LACTATION NOTE
Dr. Payne requested a lactation consult for antepartum mother anticipating a 34 week delivery next week with an automatic NICU admit. This is mother's first pregnancy. Mother states she desires to latch infant.     Discussed with out what to expect immediately after delivery with pump set up. Discussed normalcy to only see drops for the first several days after delivery, especially at 34 weeks. Discussed that the pump's main purpose is for stimulation at that time and that she will be taught hand expression which will get more colostrum out than the pump. Discussed importance of early and frequent breast stimulation to stimulate milk production. Encouraged mother to set alarms to remind herself to pump.     Discussed that NICU has feeding requirements prior to infant being cleared to latch. Initially infant will likely be NPO depending on respiratory status then will begin with feedings and will need to complete a certain number of bottle feedings prior to being cleared to latch. At that point NICU infants are typically allowed to latch once a day to ensure adequate calorie intake and weight gain and minimize fatigue from feedings. Discussed outpatient lactation clinic for continued follow up care after NICU discharge to continue working on latching. Informed mother of weighted feeds in the clinic to assure adequate milk transfer.     Mother voiced concerns about if she would have enough milk for infant once he is discharged. Informed mother that she could likely anticipate a few weeks in the NICU so she should have a full milk supply by that time assuming she is pumping regularly and frequently.     Mother verbalizes understanding of all education and counseling. Mother denies any further lactation needs or concerns at this time. Discussed lactation availability. Encouraged mother to call for assistance when needs arise.    Primary nurse, rah Colmenares.

## 2024-11-23 PROCEDURE — 99900035 HC TECH TIME PER 15 MIN (STAT)

## 2024-11-23 PROCEDURE — 25000003 PHARM REV CODE 250: Performed by: OBSTETRICS & GYNECOLOGY

## 2024-11-23 PROCEDURE — 99231 SBSQ HOSP IP/OBS SF/LOW 25: CPT | Mod: ,,, | Performed by: OBSTETRICS & GYNECOLOGY

## 2024-11-23 PROCEDURE — 59025 FETAL NON-STRESS TEST: CPT

## 2024-11-23 PROCEDURE — 11000001 HC ACUTE MED/SURG PRIVATE ROOM

## 2024-11-23 RX ADMIN — CETIRIZINE HYDROCHLORIDE 5 MG: 5 TABLET ORAL at 08:11

## 2024-11-23 RX ADMIN — NIFEDIPINE 30 MG: 30 TABLET, FILM COATED, EXTENDED RELEASE ORAL at 08:11

## 2024-11-23 RX ADMIN — ACETAMINOPHEN 1000 MG: 500 TABLET ORAL at 12:11

## 2024-11-23 RX ADMIN — FLUTICASONE PROPIONATE 100 MCG: 50 SPRAY, METERED NASAL at 08:11

## 2024-11-23 NOTE — ASSESSMENT & PLAN NOTE
US on 11/13 - 1480 g (10%), AC 3%, BPP 8/8, MVP 7.6, S:D nL, cephalic  BPP 2x/week with UA dopplers weekly  Growth u/s q 3 weeks  11/23/2024  Nst bid  Bpp/dopplers in 2 days

## 2024-11-23 NOTE — PLAN OF CARE
Patient afebrile and had no falls this shift. Denies any loss of fluid vaginally, denies contractions/abdominal pain. Voids spontaneously. Ambulates independently. Pain well controlled with oral pain medication. Vital signs stable at this time. Speaks positively of pregnancy. Safety measures in place. Call light in reach. Encouraged patient for use of call light.

## 2024-11-23 NOTE — PLAN OF CARE
Patient afebrile and had no falls this shift. Denies any loss of fluid vaginally, denies contractions/abdominal pain. Voids spontaneously. Ambulates independently. Pain well controlled with oral pain medication. Vital signs stable at this time. Speaks positively of pregnancy. Will continue to monitor.

## 2024-11-23 NOTE — PROGRESS NOTES
[facility] ([facility])  Obstetrics  Antepartum Progress Note    Patient Name: Maria De Jesus Payne  MRN: [id number]  Admission Date: 11/13/2024  Hospital Length of Stay: 5 days  Attending Physician: Jasson Pineda MD  Primary Care Provider: Abena, Primary Doctor    Subjective:     Principal Problem:Severe pre-eclampsia in third trimester    HPI:  20 yo G1 with IUP at 32w0d.  Current pregnancy complicated by GHTN.  Pt was sent from clinic secondary to elevated BP in clinic.  Pt denies headaches or vision changes on admission.  Has noted some swelling.  Otherwise no complaints.    Hospital Course:  Pt admitted to OB service with preeclampsia.  Urine P/C ratio 0.53.  Labs wnl.  Ultrasound 11/13/24 consistent with IUGR (EFW 1480 g at the 10th%, AC 3rd%), UA doppler wnl  BMZ on 11/13 and 11/14 11/14/24: HD#2, asymptomatic.  BP wnl.  Pt to complete BMZ series today  11/15/24:  Blood pressures elevated to severe range.  Magnesium sulfate given.  11/16/24: Blood pressures normal to mild range today. Episode of shortness of breath, fatigue, diminished reflexes, mag stopped, Mag level 5.8, restarted magnesium at 1g/hr  11/17/24 - Procardia XL 30 mg BID initiated for blood pressure control.  Blood pressures stable.  Patient asymptomatic.  11/18/24: BP stable on current regimen. JAD 12.8. MVP 4.9 cm. Breech presentation. No evidence of previa. Anterior placenta. Biophysical profile score 8/8   11/19/24--tacchycardia, shortness of breath and chest pain, neg CXR, elevated d dimer, neg resp panel, neg CTA-p; small pleural effusion; chest pain improved with ativan;   11/21/24--BPP 8/8, breech, umbilical artery dopplers wnl  11/23/24--denies preE symtpoms, bp stable on procardia 30mg bid    Obstetric HPI:  Patient reports None contractions, active fetal movement, absent vaginal bleeding , absent loss of fluid      Objective:     Vital Signs (Most Recent):  Temp: 98.4 °F (36.9 °C) (11/23/24 0843)  Pulse: 86 (11/23/24  0843)  Resp: 18 (24 0843)  BP: (!) 130/94 (24 0843)  SpO2: 95 % (24 1542) Vital Signs (24h Range):  Temp:  [97.5 °F (36.4 °C)-98.7 °F (37.1 °C)] 98.4 °F (36.9 °C)  Pulse:  [] 86  Resp:  [14-18] 18  SpO2:  [95 %-99 %] 95 %  BP: (130-154)/() 130/94        There is no height or weight on file to calculate BMI.    FHT: to be done later this am    No intake or output data in the 24 hours ending 24 1030    Cervical Exam:deferred     Significant Labs:  Recent Lab Results       None            Physical Exam:   Constitutional: She is oriented to person, place, and time. She appears well-developed and well-nourished.    HENT:   Head: Normocephalic.    Eyes: Pupils are equal, round, and reactive to light. Conjunctivae are normal.     Cardiovascular:  Normal rate and regular rhythm.             Pulmonary/Chest: Effort normal.        Abdominal: Soft.     Genitourinary:    Genitourinary Comments: Gravid, non tender             Musculoskeletal: Normal range of motion and moves all extremeties.       Neurological: She is alert and oriented to person, place, and time. She has normal reflexes. She exhibits normal muscle tone.    Skin: Skin is warm and dry.    Psychiatric: She has a normal mood and affect. Her behavior is normal. Judgment and thought content normal.       Review of Systems  Assessment/Plan:     19 y.o. female  at 33w3d for:    * Severe pre-eclampsia in third trimester  -Urine P/C ratio 0.53 on   - S/P magnesium sulfate x 24 hours.  - Blood pressures well controlled on procardia XL 30 mg BID  - BMZ series complete  - In the setting of preeclampsia with severe features and IUGR, will plan inpatient surveillance until delivery with weekly labs (q Monday), BPP q Monday/Thursday, and UA Dopplers q Monday.  Plan delivery at 34 weeks EGA or sooner as indicated for maternal or fetal indications.  2024  HD #10  Denies preE symtpoms  Bp stable on procardia 30 bid  S/p  magnesium sulfate; s/p bmz series  Continue nst bid  Bpp due on Thursday; u/a with dopplers on Monday  Repeat labs on Monday  Delivery at 34 wks or sooner for maternal or fetal indications  2024  HD #11  33w3d  Denies preE symptoms  Bp stable on procardia 30mg bid  Continue nst bid  Bpp/dopplers labs (cbc, cmp) due in 2 days  Deliver at 34 wk; csection if remains breech        Breech presentation  Breech presentation noted on US  and   Reviewed this with the patient and her s/o  Discussed need for  for delivery if baby remains breech. They expressed understanding    Nasal sinus congestion  2024  Add zyrtec and flonase  2024  Feels better  Continue zyrtec/flonase    Tachycardia  2024  Negative EKG, chest xray, cta    Poor fetal growth affecting management of mother in third trimester  US on  - 1480 g (10%), AC 3%, BPP 8/8, MVP 7.6, S:D nL, cephalic  BPP 2x/week with UA dopplers weekly  Growth u/s q 3 weeks  2024  Nst bid  Bpp/dopplers in 2 days          Sparkle Mclain MD  Obstetrics  [facility] ([facility])

## 2024-11-23 NOTE — ASSESSMENT & PLAN NOTE
-Urine P/C ratio 0.53 on 11/13  - S/P magnesium sulfate x 24 hours.  - Blood pressures well controlled on procardia XL 30 mg BID  - BMZ series complete  - In the setting of preeclampsia with severe features and IUGR, will plan inpatient surveillance until delivery with weekly labs (q Monday), BPP q Monday/Thursday, and UA Dopplers q Monday.  Plan delivery at 34 weeks EGA or sooner as indicated for maternal or fetal indications.  11/23/2024  HD #10  Denies preE symtpoms  Bp stable on procardia 30 bid  S/p magnesium sulfate; s/p bmz series  Continue nst bid  Bpp due on Thursday; u/a with dopplers on Monday  Repeat labs on Monday  Delivery at 34 wks or sooner for maternal or fetal indications  11/23/2024  HD #11  33w3d  Denies preE symptoms  Bp stable on procardia 30mg bid  Continue nst bid  Bpp/dopplers labs (cbc, cmp) due in 2 days  Deliver at 34 wk; csection if remains breech

## 2024-11-23 NOTE — SUBJECTIVE & OBJECTIVE
Obstetric HPI:  Patient reports None contractions, active fetal movement, absent vaginal bleeding , absent loss of fluid      Objective:     Vital Signs (Most Recent):  Temp: 98.4 °F (36.9 °C) (11/23/24 0843)  Pulse: 86 (11/23/24 0843)  Resp: 18 (11/23/24 0843)  BP: (!) 130/94 (11/23/24 0843)  SpO2: 95 % (11/22/24 1542) Vital Signs (24h Range):  Temp:  [97.5 °F (36.4 °C)-98.7 °F (37.1 °C)] 98.4 °F (36.9 °C)  Pulse:  [] 86  Resp:  [14-18] 18  SpO2:  [95 %-99 %] 95 %  BP: (130-154)/() 130/94        There is no height or weight on file to calculate BMI.    FHT: to be done later this am    No intake or output data in the 24 hours ending 11/23/24 1030    Cervical Exam:deferred     Significant Labs:  Recent Lab Results       None            Physical Exam:   Constitutional: She is oriented to person, place, and time. She appears well-developed and well-nourished.    HENT:   Head: Normocephalic.    Eyes: Pupils are equal, round, and reactive to light. Conjunctivae are normal.     Cardiovascular:  Normal rate and regular rhythm.             Pulmonary/Chest: Effort normal.        Abdominal: Soft.     Genitourinary:    Genitourinary Comments: Gravid, non tender             Musculoskeletal: Normal range of motion and moves all extremeties.       Neurological: She is alert and oriented to person, place, and time. She has normal reflexes. She exhibits normal muscle tone.    Skin: Skin is warm and dry.    Psychiatric: She has a normal mood and affect. Her behavior is normal. Judgment and thought content normal.       Review of Systems

## 2024-11-24 PROCEDURE — 59025 FETAL NON-STRESS TEST: CPT | Mod: 26,,, | Performed by: OBSTETRICS & GYNECOLOGY

## 2024-11-24 PROCEDURE — 4A1HXCZ MONITORING OF PRODUCTS OF CONCEPTION, CARDIAC RATE, EXTERNAL APPROACH: ICD-10-PCS | Performed by: OBSTETRICS & GYNECOLOGY

## 2024-11-24 PROCEDURE — 59025 FETAL NON-STRESS TEST: CPT

## 2024-11-24 PROCEDURE — 99231 SBSQ HOSP IP/OBS SF/LOW 25: CPT | Mod: 25,,, | Performed by: OBSTETRICS & GYNECOLOGY

## 2024-11-24 PROCEDURE — 25000003 PHARM REV CODE 250: Performed by: OBSTETRICS & GYNECOLOGY

## 2024-11-24 PROCEDURE — 11000001 HC ACUTE MED/SURG PRIVATE ROOM

## 2024-11-24 PROCEDURE — 59025 FETAL NON-STRESS TEST: CPT | Mod: 76

## 2024-11-24 RX ADMIN — FLUTICASONE PROPIONATE 100 MCG: 50 SPRAY, METERED NASAL at 08:11

## 2024-11-24 RX ADMIN — PRENATAL VITAMINS-IRON FUMARATE 27 MG IRON-FOLIC ACID 0.8 MG TABLET 1 TABLET: at 08:11

## 2024-11-24 RX ADMIN — NIFEDIPINE 30 MG: 30 TABLET, FILM COATED, EXTENDED RELEASE ORAL at 08:11

## 2024-11-24 RX ADMIN — CETIRIZINE HYDROCHLORIDE 5 MG: 5 TABLET ORAL at 08:11

## 2024-11-24 RX ADMIN — ACETAMINOPHEN 1000 MG: 500 TABLET ORAL at 08:11

## 2024-11-24 NOTE — PROGRESS NOTES
[facility] ([facility])  Obstetrics  Antepartum Progress Note    Patient Name: Maria De Jesus Payne  MRN: [id number]  Admission Date: 11/13/2024  Hospital Length of Stay: 6 days  Attending Physician: Jasson Pineda MD  Primary Care Provider: Abena, Primary Doctor    Subjective:     Principal Problem:Severe pre-eclampsia in third trimester    HPI:  18 yo G1 with IUP at 32w0d.  Current pregnancy complicated by GHTN.  Pt was sent from clinic secondary to elevated BP in clinic.  Pt denies headaches or vision changes on admission.  Has noted some swelling.  Otherwise no complaints.    Hospital Course:  Pt admitted to OB service with preeclampsia.  Urine P/C ratio 0.53.  Labs wnl.  Ultrasound 11/13/24 consistent with IUGR (EFW 1480 g at the 10th%, AC 3rd%), UA doppler wnl  BMZ on 11/13 and 11/14 11/14/24: HD#2, asymptomatic.  BP wnl.  Pt to complete BMZ series today  11/15/24:  Blood pressures elevated to severe range.  Magnesium sulfate given.  11/16/24: Blood pressures normal to mild range today. Episode of shortness of breath, fatigue, diminished reflexes, mag stopped, Mag level 5.8, restarted magnesium at 1g/hr  11/17/24 - Procardia XL 30 mg BID initiated for blood pressure control.  Blood pressures stable.  Patient asymptomatic.  11/18/24: BP stable on current regimen. JAD 12.8. MVP 4.9 cm. Breech presentation. No evidence of previa. Anterior placenta. Biophysical profile score 8/8   11/19/24--tacchycardia, shortness of breath and chest pain, neg CXR, elevated d dimer, neg resp panel, neg CTA-p; small pleural effusion; chest pain improved with ativan;   11/21/24--BPP 8/8, breech, umbilical artery dopplers wnl  11/23/24--denies preE symtpoms, bp stable on procardia 30mg bid  11/24/24-denies preE stympoms, bp elevated on procardia 30bid, but no severe range pressures    Obstetric HPI:  Patient reports None contractions, active fetal movement, absent vaginal bleeding , absent loss of fluid      Objective:      Vital Signs (Most Recent):  Temp: 98.8 °F (37.1 °C) (24 0834)  Pulse: 99 (24 0834)  Resp: 18 (24 0834)  BP: (!) 131/99 (24 0834)  SpO2: 95 % (24 1542) Vital Signs (24h Range):  Temp:  [97.5 °F (36.4 °C)-98.8 °F (37.1 °C)] 98.8 °F (37.1 °C)  Pulse:  [] 99  Resp:  [18] 18  BP: (123-149)/(82-99) 131/99        There is no height or weight on file to calculate BMI.    FHT: 140Cat 1 (reassuring)  TOCO: none    No intake or output data in the 24 hours ending 24 1037    Cervix deferred  Significant Labs:  Recent Lab Results       None            Physical Exam:   Constitutional: She is oriented to person, place, and time. She appears well-developed and well-nourished.    HENT:   Head: Normocephalic.    Eyes: Pupils are equal, round, and reactive to light. Conjunctivae are normal.     Cardiovascular:  Normal rate and regular rhythm.             Pulmonary/Chest: Effort normal.        Abdominal: Soft.     Genitourinary:    Genitourinary Comments: Gravid, non tender             Musculoskeletal: Normal range of motion and moves all extremeties. Edema (trace swelling in feet) present.       Neurological: She is alert and oriented to person, place, and time. She has normal reflexes.    Skin: Skin is warm and dry.    Psychiatric: She has a normal mood and affect. Her behavior is normal. Judgment and thought content normal.       Review of Systems  Assessment/Plan:     19 y.o. female  at 33w4d for:    * Severe pre-eclampsia in third trimester  -Urine P/C ratio 0.53 on   - S/P magnesium sulfate x 24 hours.  - Blood pressures well controlled on procardia XL 30 mg BID  - BMZ series complete  - In the setting of preeclampsia with severe features and IUGR, will plan inpatient surveillance until delivery with weekly labs (q Monday), BPP q Monday/Thursday, and UA Dopplers q Monday.  Plan delivery at 34 weeks EGA or sooner as indicated for maternal or fetal  indications.  2024  HD #10  Denies preE symtpoms  Bp stable on procardia 30 bid  S/p magnesium sulfate; s/p bmz series  Continue nst bid  Bpp due on Thursday; u/a with dopplers on Monday  Repeat labs on Monday  Delivery at 34 wks or sooner for maternal or fetal indications  2024  HD #11  33w3d  Denies preE symptoms  Bp stable on procardia 30mg bid  Continue nst bid  Bpp/dopplers labs (cbc, cmp) due in 2 days  Deliver at 34 wk; csection if remains breech  2024  HD #12  33w4d  Denies preE symtpoms  Cotninue procardia 30 bid  Continue nst bid  Bpp /dopplers due tomorrow; cbc, cmp, t&s tomorrow  C/s at 34 w (if remains breech) or deliver sooner for maternal or fetal indications        Breech presentation  Breech presentation noted on US  and   Reviewed this with the patient and her s/o  Discussed need for  for delivery if baby remains breech. They expressed understanding  2024  Patient attemping downward dog maneuvers to try to help turn baby  Sono tomorrow    Nasal sinus congestion  2024  Add zyrtec and flonase  2024  Feels better  Continue zyrtec/flonase    Tachycardia  2024  Negative EKG, chest xray, cta    Poor fetal growth affecting management of mother in third trimester  US on  - 1480 g (10%), AC 3%, BPP 8/8, MVP 7.6, S:D nL, cephalic  BPP 2x/week with UA dopplers weekly  Growth u/s q 3 weeks  2024  Nst bid  Bpp/dopplers in 2 days          Sparkle Mclain MD  Obstetrics  [facility] ([facility])

## 2024-11-24 NOTE — SUBJECTIVE & OBJECTIVE
Obstetric HPI:  Patient reports None contractions, active fetal movement, absent vaginal bleeding , absent loss of fluid      Objective:     Vital Signs (Most Recent):  Temp: 98.8 °F (37.1 °C) (11/24/24 0834)  Pulse: 99 (11/24/24 0834)  Resp: 18 (11/24/24 0834)  BP: (!) 131/99 (11/24/24 0834)  SpO2: 95 % (11/22/24 1542) Vital Signs (24h Range):  Temp:  [97.5 °F (36.4 °C)-98.8 °F (37.1 °C)] 98.8 °F (37.1 °C)  Pulse:  [] 99  Resp:  [18] 18  BP: (123-149)/(82-99) 131/99        There is no height or weight on file to calculate BMI.    FHT: 140Cat 1 (reassuring)  TOCO: none    No intake or output data in the 24 hours ending 11/24/24 1037    Cervix deferred  Significant Labs:  Recent Lab Results       None            Physical Exam:   Constitutional: She is oriented to person, place, and time. She appears well-developed and well-nourished.    HENT:   Head: Normocephalic.    Eyes: Pupils are equal, round, and reactive to light. Conjunctivae are normal.     Cardiovascular:  Normal rate and regular rhythm.             Pulmonary/Chest: Effort normal.        Abdominal: Soft.     Genitourinary:    Genitourinary Comments: Gravid, non tender             Musculoskeletal: Normal range of motion and moves all extremeties. Edema (trace swelling in feet) present.       Neurological: She is alert and oriented to person, place, and time. She has normal reflexes.    Skin: Skin is warm and dry.    Psychiatric: She has a normal mood and affect. Her behavior is normal. Judgment and thought content normal.       Review of Systems

## 2024-11-24 NOTE — ASSESSMENT & PLAN NOTE
Breech presentation noted on US  and   Reviewed this with the patient and her s/o  Discussed need for  for delivery if baby remains breech. They expressed understanding  2024  Patient attemping downward dog maneuvers to try to help turn baby  Sono tomorrow

## 2024-11-24 NOTE — ASSESSMENT & PLAN NOTE
-Urine P/C ratio 0.53 on 11/13  - S/P magnesium sulfate x 24 hours.  - Blood pressures well controlled on procardia XL 30 mg BID  - BMZ series complete  - In the setting of preeclampsia with severe features and IUGR, will plan inpatient surveillance until delivery with weekly labs (q Monday), BPP q Monday/Thursday, and UA Dopplers q Monday.  Plan delivery at 34 weeks EGA or sooner as indicated for maternal or fetal indications.  11/24/2024  HD #10  Denies preE symtpoms  Bp stable on procardia 30 bid  S/p magnesium sulfate; s/p bmz series  Continue nst bid  Bpp due on Thursday; u/a with dopplers on Monday  Repeat labs on Monday  Delivery at 34 wks or sooner for maternal or fetal indications  11/24/2024  HD #11  33w3d  Denies preE symptoms  Bp stable on procardia 30mg bid  Continue nst bid  Bpp/dopplers labs (cbc, cmp) due in 2 days  Deliver at 34 wk; csection if remains breech  11/24/2024  HD #12  33w4d  Denies preE symtpoms  Cotninue procardia 30 bid  Continue nst bid  Bpp /dopplers due tomorrow; cbc, cmp, t&s tomorrow  C/s at 34 w (if remains breech) or deliver sooner for maternal or fetal indications

## 2024-11-24 NOTE — PLAN OF CARE
POC reviewed with patient. Denies any loss of fluid vaginally, denies contractions/abdominal pain. Voids spontaneously. Ambulates independently. Patient denies any pain. VSS. Speaks positively of pregnancy; reports good fetal movement. NST BID. Call light placed within reach, encouraged use if needed.

## 2024-11-24 NOTE — PROCEDURES
Maria De Jesus Payne is a 19 y.o. female patient.    Temp: 98.8 °F (37.1 °C) (11/24/24 0834)  Pulse: 99 (11/24/24 0834)  Resp: 18 (11/24/24 0834)  BP: (!) 131/99 (11/24/24 0834)  SpO2: 95 % (11/22/24 1542)       Obtain Fetal nonstress test (NST)    Date/Time: 11/24/2024 10:39 AM    Performed by: Sparkle Mclain MD  Authorized by: Ashley Richmond MD    Nonstress Test:     Variability:  6-25 BPM    Decelerations:  None    Accelerations:  10 bpm    Acoustic Stimulator: No      Baseline:  140    Uterine Irritability: No      Contractions:  Not present  Biophysical Profile:     Nonstress Test Interpretation: reactive      Overall Impression:  Reassuring  Post-procedure:     Patient tolerance:  Patient tolerated the procedure well with no immediate complications      11/24/2024

## 2024-11-25 LAB
ABO + RH BLD: NORMAL
ALBUMIN SERPL BCP-MCNC: 2.3 G/DL (ref 3.5–5.2)
ALP SERPL-CCNC: 425 U/L (ref 40–150)
ALT SERPL W/O P-5'-P-CCNC: 23 U/L (ref 10–44)
ANION GAP SERPL CALC-SCNC: 8 MMOL/L (ref 8–16)
AST SERPL-CCNC: 21 U/L (ref 10–40)
BILIRUB SERPL-MCNC: 0.1 MG/DL (ref 0.1–1)
BLD GP AB SCN CELLS X3 SERPL QL: NORMAL
BUN SERPL-MCNC: 8 MG/DL (ref 6–20)
CALCIUM SERPL-MCNC: 9.1 MG/DL (ref 8.7–10.5)
CHLORIDE SERPL-SCNC: 110 MMOL/L (ref 95–110)
CO2 SERPL-SCNC: 20 MMOL/L (ref 23–29)
CREAT SERPL-MCNC: 0.8 MG/DL (ref 0.5–1.4)
ERYTHROCYTE [DISTWIDTH] IN BLOOD BY AUTOMATED COUNT: 17.7 % (ref 11.5–14.5)
EST. GFR  (NO RACE VARIABLE): >60 ML/MIN/1.73 M^2
GLUCOSE SERPL-MCNC: 84 MG/DL (ref 70–110)
HCT VFR BLD AUTO: 38.7 % (ref 37–48.5)
HGB BLD-MCNC: 12.6 G/DL (ref 12–16)
MCH RBC QN AUTO: 26.8 PG (ref 27–31)
MCHC RBC AUTO-ENTMCNC: 32.6 G/DL (ref 32–36)
MCV RBC AUTO: 82 FL (ref 82–98)
PLATELET # BLD AUTO: 158 K/UL (ref 150–450)
PMV BLD AUTO: 11 FL (ref 9.2–12.9)
POTASSIUM SERPL-SCNC: 4.1 MMOL/L (ref 3.5–5.1)
PROT SERPL-MCNC: 6.3 G/DL (ref 6–8.4)
RBC # BLD AUTO: 4.71 M/UL (ref 4–5.4)
SODIUM SERPL-SCNC: 138 MMOL/L (ref 136–145)
SPECIMEN OUTDATE: NORMAL
WBC # BLD AUTO: 10.19 K/UL (ref 3.9–12.7)

## 2024-11-25 PROCEDURE — 80053 COMPREHEN METABOLIC PANEL: CPT | Performed by: OBSTETRICS & GYNECOLOGY

## 2024-11-25 PROCEDURE — 25000003 PHARM REV CODE 250: Performed by: OBSTETRICS & GYNECOLOGY

## 2024-11-25 PROCEDURE — 85027 COMPLETE CBC AUTOMATED: CPT | Performed by: OBSTETRICS & GYNECOLOGY

## 2024-11-25 PROCEDURE — 86850 RBC ANTIBODY SCREEN: CPT | Performed by: OBSTETRICS & GYNECOLOGY

## 2024-11-25 PROCEDURE — 11000001 HC ACUTE MED/SURG PRIVATE ROOM

## 2024-11-25 PROCEDURE — 99233 SBSQ HOSP IP/OBS HIGH 50: CPT | Mod: ,,, | Performed by: STUDENT IN AN ORGANIZED HEALTH CARE EDUCATION/TRAINING PROGRAM

## 2024-11-25 RX ADMIN — CETIRIZINE HYDROCHLORIDE 5 MG: 5 TABLET ORAL at 08:11

## 2024-11-25 RX ADMIN — NIFEDIPINE 30 MG: 30 TABLET, FILM COATED, EXTENDED RELEASE ORAL at 08:11

## 2024-11-25 RX ADMIN — FLUTICASONE PROPIONATE 100 MCG: 50 SPRAY, METERED NASAL at 08:11

## 2024-11-25 NOTE — ASSESSMENT & PLAN NOTE
-Urine P/C ratio 0.53 on 11/13  - S/P magnesium sulfate x 24 hours.  - Blood pressures well controlled on procardia XL 30 mg BID  - BMZ series complete  - In the setting of preeclampsia with severe features and IUGR, will plan inpatient surveillance until delivery with weekly labs (q Monday), BPP q Monday/Thursday, and UA Dopplers q Monday.  Plan delivery at 34 weeks EGA or sooner as indicated for maternal or fetal indications.    HD #10  Denies preE symtpoms  Bp stable on procardia 30 bid  S/p magnesium sulfate; s/p bmz series  Continue nst bid  Bpp due on Thursday; u/a with dopplers on Monday  Repeat labs on Monday  Delivery at 34 wks or sooner for maternal or fetal indications    HD #11  33w3d  Denies preE symptoms  Bp stable on procardia 30mg bid  Continue nst bid  Bpp/dopplers labs (cbc, cmp) due in 2 days  Deliver at 34 wk; csection if remains breech    HD #12  33w4d  Denies preE symtpoms  Cotninue procardia 30 bid  Continue nst bid  Bpp /dopplers due tomorrow; cbc, cmp, t&s tomorrow  C/s at 34 w (if remains breech) or deliver sooner for maternal or fetal indications    11/25/24  No issues, BPP 8/8, normal dopplers, CBC, CMP stable, T&S obtained  Will continue to monitor fetal and maternal status for changes  Will increase Procardia if patient consistently close to severe range.

## 2024-11-25 NOTE — PLAN OF CARE
Problem: Adult Inpatient Plan of Care  Goal: Plan of Care Review  Outcome: Progressing  Goal: Patient-Specific Goal (Individualized)  Outcome: Progressing  Goal: Absence of Hospital-Acquired Illness or Injury  Outcome: Progressing  Goal: Optimal Comfort and Wellbeing  Outcome: Progressing  Goal: Readiness for Transition of Care  Outcome: Progressing     Problem:  Fall Injury Risk  Goal: Absence of Fall, Infant Drop and Related Injury  Outcome: Progressing     Problem: Hospitalized  Patient  Goal: Optimal Patient-Fetal Wellbeing  Outcome: Progressing     Problem: Skin Injury Risk Increased  Goal: Skin Health and Integrity  Outcome: Progressing     Problem: Hypertensive Disorders in Pregnancy  Goal: Patient-Fetal Stabilization  Outcome: Progressing

## 2024-11-25 NOTE — ASSESSMENT & PLAN NOTE
Breech presentation noted on US  and   Reviewed this with the patient and her s/o  Discussed need for  for delivery if baby remains breech. They expressed understanding  2024  US today reveals cephalic position

## 2024-11-25 NOTE — SUBJECTIVE & OBJECTIVE
Obstetric HPI:  Patient reports None contractions, active fetal movement, absent vaginal bleeding , absent loss of fluid      Objective:     Vital Signs (Most Recent):  Temp: 98.3 °F (36.8 °C) (11/25/24 1611)  Pulse: (!) 111 (11/25/24 1611)  Resp: 17 (11/25/24 1611)  BP: (!) 129/91 (11/25/24 1611)  SpO2: 98 % (11/25/24 1611) Vital Signs (24h Range):  Temp:  [97.8 °F (36.6 °C)-99.1 °F (37.3 °C)] 98.3 °F (36.8 °C)  Pulse:  [] 111  Resp:  [17-18] 17  SpO2:  [98 %-99 %] 98 %  BP: (128-151)/(76-96) 129/91        There is no height or weight on file to calculate BMI.    FHT: 150 Cat 1 (reassuring)  TOCO:  none    No intake or output data in the 24 hours ending 11/25/24 1750      Significant Labs:  Recent Lab Results         11/25/24  0600   11/25/24  0559        Albumin   2.3       ALP   425       ALT   23       Anion Gap   8       AST   21       BILIRUBIN TOTAL   0.1  Comment: For infants and newborns, interpretation of results should be based  on gestational age, weight and in agreement with clinical  observations.    Premature Infant recommended reference ranges:  Up to 24 hours.............<8.0 mg/dL  Up to 48 hours............<12.0 mg/dL  3-5 days..................<15.0 mg/dL  6-29 days.................<15.0 mg/dL         BUN   8       Calcium   9.1       Chloride   110       CO2   20       Creatinine   0.8       eGFR   >60       Glucose   84       Group & Rh O POS         Hematocrit   38.7       Hemoglobin   12.6       INDIRECT MARELY NEG         MCH   26.8       MCHC   32.6       MCV   82       MPV   11.0       Platelet Count   158       Potassium   4.1       PROTEIN TOTAL   6.3       RBC   4.71       RDW   17.7       Sodium   138       Specimen Outdate 11/28/2024 23:59         WBC   10.19               Physical Exam:   Constitutional: She is oriented to person, place, and time. No distress.    HENT:   Head: Normocephalic and atraumatic.      Cardiovascular:  Normal rate.             Pulmonary/Chest: Effort  normal.        Abdominal: Soft. There is no abdominal tenderness.             Musculoskeletal: No tenderness or edema.      Right upper arm: She exhibits no swelling.      Left upper arm: She exhibits no swelling.      Right lower leg: She exhibits no swelling. No edema.      Left lower leg: She exhibits no swelling. No edema.       Neurological: She is alert and oriented to person, place, and time.     Psychiatric: She has a normal mood and affect.       Review of Systems   Constitutional:  Negative for chills and fever.   Gastrointestinal:  Negative for abdominal pain.   Genitourinary:  Negative for pelvic pain and vaginal bleeding.

## 2024-11-25 NOTE — ASSESSMENT & PLAN NOTE
US on 11/13 - 1480 g (10%), AC 3%, BPP 8/8, MVP 7.6, S:D nL, cephalic  BPP 2x/week with UA dopplers weekly  Growth u/s q 3 weeks  Nst bid    11/25/24  BPP 8/8, normal dopplers

## 2024-11-25 NOTE — PROGRESS NOTES
[facility] ([facility])  Obstetrics  Antepartum Progress Note    Patient Name: Maria De Jesus Payne  MRN: [id number]  Admission Date: 11/13/2024  Hospital Length of Stay: 7 days  Attending Physician: Jasson Pineda MD  Primary Care Provider: Abena, Primary Doctor    Subjective:     Principal Problem:Severe pre-eclampsia in third trimester    HPI:  20 yo G1 with IUP at 32w0d.  Current pregnancy complicated by GHTN.  Pt was sent from clinic secondary to elevated BP in clinic.  Pt denies headaches or vision changes on admission.  Has noted some swelling.  Otherwise no complaints.    Hospital Course:  Pt admitted to OB service with preeclampsia.  Urine P/C ratio 0.53.  Labs wnl.  Ultrasound 11/13/24 consistent with IUGR (EFW 1480 g at the 10th%, AC 3rd%), UA doppler wnl  BMZ on 11/13 and 11/14 11/14/24: HD#2, asymptomatic.  BP wnl.  Pt to complete BMZ series today  11/15/24:  Blood pressures elevated to severe range.  Magnesium sulfate given.  11/16/24: Blood pressures normal to mild range today. Episode of shortness of breath, fatigue, diminished reflexes, mag stopped, Mag level 5.8, restarted magnesium at 1g/hr  11/17/24 - Procardia XL 30 mg BID initiated for blood pressure control.  Blood pressures stable.  Patient asymptomatic.  11/18/24: BP stable on current regimen. JAD 12.8. MVP 4.9 cm. Breech presentation. No evidence of previa. Anterior placenta. Biophysical profile score 8/8   11/19/24--tacchycardia, shortness of breath and chest pain, neg CXR, elevated d dimer, neg resp panel, neg CTA-p; small pleural effusion; chest pain improved with ativan;   11/21/24--BPP 8/8, breech, umbilical artery dopplers wnl  11/23/24--denies preE symtpoms, bp stable on procardia 30mg bid  11/24/24-denies preE stympoms, bp elevated on procardia 30bid, but no severe range pressures  11/25/24- Denies preE symptoms, continue procardia 30BID, BPP 8/8, dopplers normal, pre-eclampsia labs stable, no severe range blood  pressures.    Obstetric HPI:  Patient reports None contractions, active fetal movement, absent vaginal bleeding , absent loss of fluid      Objective:     Vital Signs (Most Recent):  Temp: 98.3 °F (36.8 °C) (11/25/24 1611)  Pulse: (!) 111 (11/25/24 1611)  Resp: 17 (11/25/24 1611)  BP: (!) 129/91 (11/25/24 1611)  SpO2: 98 % (11/25/24 1611) Vital Signs (24h Range):  Temp:  [97.8 °F (36.6 °C)-99.1 °F (37.3 °C)] 98.3 °F (36.8 °C)  Pulse:  [] 111  Resp:  [17-18] 17  SpO2:  [98 %-99 %] 98 %  BP: (128-151)/(76-96) 129/91        There is no height or weight on file to calculate BMI.    FHT: 150 Cat 1 (reassuring)  TOCO:  none    No intake or output data in the 24 hours ending 11/25/24 1750      Significant Labs:  Recent Lab Results         11/25/24  0600   11/25/24  0559        Albumin   2.3       ALP   425       ALT   23       Anion Gap   8       AST   21       BILIRUBIN TOTAL   0.1  Comment: For infants and newborns, interpretation of results should be based  on gestational age, weight and in agreement with clinical  observations.    Premature Infant recommended reference ranges:  Up to 24 hours.............<8.0 mg/dL  Up to 48 hours............<12.0 mg/dL  3-5 days..................<15.0 mg/dL  6-29 days.................<15.0 mg/dL         BUN   8       Calcium   9.1       Chloride   110       CO2   20       Creatinine   0.8       eGFR   >60       Glucose   84       Group & Rh O POS         Hematocrit   38.7       Hemoglobin   12.6       INDIRECT MARELY NEG         MCH   26.8       MCHC   32.6       MCV   82       MPV   11.0       Platelet Count   158       Potassium   4.1       PROTEIN TOTAL   6.3       RBC   4.71       RDW   17.7       Sodium   138       Specimen Outdate 11/28/2024 23:59         WBC   10.19               Physical Exam:   Constitutional: She is oriented to person, place, and time. No distress.    HENT:   Head: Normocephalic and atraumatic.      Cardiovascular:  Normal rate.              Pulmonary/Chest: Effort normal.        Abdominal: Soft. There is no abdominal tenderness.             Musculoskeletal: No tenderness or edema.      Right upper arm: She exhibits no swelling.      Left upper arm: She exhibits no swelling.      Right lower leg: She exhibits no swelling. No edema.      Left lower leg: She exhibits no swelling. No edema.       Neurological: She is alert and oriented to person, place, and time.     Psychiatric: She has a normal mood and affect.       Review of Systems   Constitutional:  Negative for chills and fever.   Gastrointestinal:  Negative for abdominal pain.   Genitourinary:  Negative for pelvic pain and vaginal bleeding.     Assessment/Plan:     19 y.o. female  at 33w5d for:    * Severe pre-eclampsia in third trimester  -Urine P/C ratio 0.53 on   - S/P magnesium sulfate x 24 hours.  - Blood pressures well controlled on procardia XL 30 mg BID  - BMZ series complete  - In the setting of preeclampsia with severe features and IUGR, will plan inpatient surveillance until delivery with weekly labs (q Monday), BPP q Monday/Thursday, and UA Dopplers q Monday.  Plan delivery at 34 weeks EGA or sooner as indicated for maternal or fetal indications.    HD #10  Denies preE symtpoms  Bp stable on procardia 30 bid  S/p magnesium sulfate; s/p bmz series  Continue nst bid  Bpp due on Thursday; u/a with dopplers on Monday  Repeat labs on Monday  Delivery at 34 wks or sooner for maternal or fetal indications    HD #11  33w3d  Denies preE symptoms  Bp stable on procardia 30mg bid  Continue nst bid  Bpp/dopplers labs (cbc, cmp) due in 2 days  Deliver at 34 wk; csection if remains breech    HD #12  33w4d  Denies preE symtpoms  Cotninue procardia 30 bid  Continue nst bid  Bpp /dopplers due tomorrow; cbc, cmp, t&s tomorrow  C/s at 34 w (if remains breech) or deliver sooner for maternal or fetal indications    24  No issues, BPP 8/8, normal dopplers, CBC, CMP stable, T&S  obtained  Will continue to monitor fetal and maternal status for changes  Will increase Procardia if patient consistently close to severe range.      Breech presentation  Breech presentation noted on US  and   Reviewed this with the patient and her s/o  Discussed need for  for delivery if baby remains breech. They expressed understanding  2024  US today reveals cephalic position    Nasal sinus congestion  2024  Add zyrtec and flonase  2024  Feels better  Continue zyrtec/flonase    Tachycardia  2024  Negative EKG, chest xray, cta    Poor fetal growth affecting management of mother in third trimester  US on  - 1480 g (10%), AC 3%, BPP 8/8, MVP 7.6, S:D nL, cephalic  BPP 2x/week with UA dopplers weekly  Growth u/s q 3 weeks  Nst bid    24  BPP 8/8, normal dopplers          Yanet Martines MD  Obstetrics  [facility] ([facility])

## 2024-11-25 NOTE — PLAN OF CARE
Patient afebrile and had no falls this shift. Pt denies bleeding, discharge, cramping, and visual disturbances. Voids spontaneously. Ambulates independently. Pain well controlled with oral pain medication. Vital signs stable at this time.       Problem: Adult Inpatient Plan of Care  Goal: Plan of Care Review  Outcome: Progressing  Goal: Patient-Specific Goal (Individualized)  Outcome: Progressing  Goal: Absence of Hospital-Acquired Illness or Injury  Outcome: Progressing  Goal: Optimal Comfort and Wellbeing  Outcome: Progressing  Goal: Readiness for Transition of Care  Outcome: Progressing     Problem:  Fall Injury Risk  Goal: Absence of Fall, Infant Drop and Related Injury  Outcome: Progressing     Problem: Hospitalized  Patient  Goal: Optimal Patient-Fetal Wellbeing  Outcome: Progressing     Problem: Skin Injury Risk Increased  Goal: Skin Health and Integrity  Outcome: Progressing     Problem: Hypertensive Disorders in Pregnancy  Goal: Patient-Fetal Stabilization  Outcome: Progressing

## 2024-11-26 PROCEDURE — 25000003 PHARM REV CODE 250: Performed by: OBSTETRICS & GYNECOLOGY

## 2024-11-26 PROCEDURE — 87653 STREP B DNA AMP PROBE: CPT | Performed by: ADVANCED PRACTICE MIDWIFE

## 2024-11-26 PROCEDURE — 72100002 HC LABOR CARE, 1ST 8 HOURS

## 2024-11-26 PROCEDURE — 11000001 HC ACUTE MED/SURG PRIVATE ROOM

## 2024-11-26 PROCEDURE — 99231 SBSQ HOSP IP/OBS SF/LOW 25: CPT | Mod: ,,, | Performed by: OBSTETRICS & GYNECOLOGY

## 2024-11-26 RX ORDER — SODIUM CHLORIDE, SODIUM LACTATE, POTASSIUM CHLORIDE, CALCIUM CHLORIDE 600; 310; 30; 20 MG/100ML; MG/100ML; MG/100ML; MG/100ML
INJECTION, SOLUTION INTRAVENOUS CONTINUOUS
Status: DISCONTINUED | OUTPATIENT
Start: 2024-11-26 | End: 2024-11-28

## 2024-11-26 RX ORDER — LABETALOL HYDROCHLORIDE 5 MG/ML
20 INJECTION, SOLUTION INTRAVENOUS ONCE AS NEEDED
Status: DISCONTINUED | OUTPATIENT
Start: 2024-11-26 | End: 2024-11-28 | Stop reason: SDUPTHER

## 2024-11-26 RX ORDER — SIMETHICONE 80 MG
1 TABLET,CHEWABLE ORAL 4 TIMES DAILY PRN
Status: DISCONTINUED | OUTPATIENT
Start: 2024-11-26 | End: 2024-11-28 | Stop reason: SDUPTHER

## 2024-11-26 RX ORDER — OXYTOCIN 10 [USP'U]/ML
10 INJECTION, SOLUTION INTRAMUSCULAR; INTRAVENOUS ONCE AS NEEDED
Status: DISCONTINUED | OUTPATIENT
Start: 2024-11-26 | End: 2024-11-28 | Stop reason: SDUPTHER

## 2024-11-26 RX ORDER — TERBUTALINE SULFATE 1 MG/ML
0.25 INJECTION SUBCUTANEOUS
Status: DISCONTINUED | OUTPATIENT
Start: 2024-11-26 | End: 2024-11-28

## 2024-11-26 RX ORDER — LIDOCAINE HYDROCHLORIDE 10 MG/ML
10 INJECTION, SOLUTION EPIDURAL; INFILTRATION; INTRACAUDAL; PERINEURAL ONCE AS NEEDED
Status: DISCONTINUED | OUTPATIENT
Start: 2024-11-26 | End: 2024-11-28

## 2024-11-26 RX ORDER — MISOPROSTOL 200 UG/1
800 TABLET ORAL ONCE AS NEEDED
Status: DISCONTINUED | OUTPATIENT
Start: 2024-11-26 | End: 2024-11-28 | Stop reason: SDUPTHER

## 2024-11-26 RX ORDER — HYDROXYZINE PAMOATE 25 MG/1
100 CAPSULE ORAL EVERY 8 HOURS PRN
Status: DISCONTINUED | OUTPATIENT
Start: 2024-11-26 | End: 2024-11-28

## 2024-11-26 RX ORDER — HYDRALAZINE HYDROCHLORIDE 20 MG/ML
10 INJECTION INTRAMUSCULAR; INTRAVENOUS ONCE AS NEEDED
Status: DISCONTINUED | OUTPATIENT
Start: 2024-11-26 | End: 2024-11-28 | Stop reason: SDUPTHER

## 2024-11-26 RX ORDER — CARBOPROST TROMETHAMINE 250 UG/ML
250 INJECTION, SOLUTION INTRAMUSCULAR
Status: DISCONTINUED | OUTPATIENT
Start: 2024-11-26 | End: 2024-11-28 | Stop reason: SDUPTHER

## 2024-11-26 RX ORDER — ONDANSETRON 8 MG/1
8 TABLET, ORALLY DISINTEGRATING ORAL EVERY 8 HOURS PRN
Status: DISCONTINUED | OUTPATIENT
Start: 2024-11-26 | End: 2024-11-28 | Stop reason: SDUPTHER

## 2024-11-26 RX ORDER — SODIUM CHLORIDE 9 MG/ML
INJECTION, SOLUTION INTRAVENOUS
Status: DISCONTINUED | OUTPATIENT
Start: 2024-11-26 | End: 2024-11-28

## 2024-11-26 RX ORDER — OXYTOCIN/0.9 % SODIUM CHLORIDE 15/250 ML
95 PLASTIC BAG, INJECTION (ML) INTRAVENOUS CONTINUOUS PRN
Status: DISCONTINUED | OUTPATIENT
Start: 2024-11-26 | End: 2024-11-28 | Stop reason: CLARIF

## 2024-11-26 RX ORDER — DIPHENOXYLATE HYDROCHLORIDE AND ATROPINE SULFATE 2.5; .025 MG/1; MG/1
2 TABLET ORAL EVERY 6 HOURS PRN
Status: DISCONTINUED | OUTPATIENT
Start: 2024-11-26 | End: 2024-11-28 | Stop reason: SDUPTHER

## 2024-11-26 RX ORDER — LABETALOL HYDROCHLORIDE 5 MG/ML
40 INJECTION, SOLUTION INTRAVENOUS ONCE AS NEEDED
Status: DISCONTINUED | OUTPATIENT
Start: 2024-11-26 | End: 2024-11-28 | Stop reason: SDUPTHER

## 2024-11-26 RX ORDER — TRANEXAMIC ACID 10 MG/ML
1000 INJECTION, SOLUTION INTRAVENOUS EVERY 30 MIN PRN
Status: DISCONTINUED | OUTPATIENT
Start: 2024-11-26 | End: 2024-11-28 | Stop reason: SDUPTHER

## 2024-11-26 RX ORDER — OXYTOCIN/0.9 % SODIUM CHLORIDE 15/250 ML
95 PLASTIC BAG, INJECTION (ML) INTRAVENOUS ONCE AS NEEDED
Status: DISCONTINUED | OUTPATIENT
Start: 2024-11-26 | End: 2024-11-28 | Stop reason: CLARIF

## 2024-11-26 RX ORDER — MISOPROSTOL 100 MCG
25 TABLET ORAL EVERY 4 HOURS PRN
Status: DISCONTINUED | OUTPATIENT
Start: 2024-11-26 | End: 2024-11-28

## 2024-11-26 RX ORDER — LABETALOL HYDROCHLORIDE 5 MG/ML
80 INJECTION, SOLUTION INTRAVENOUS ONCE AS NEEDED
Status: DISCONTINUED | OUTPATIENT
Start: 2024-11-26 | End: 2024-11-28 | Stop reason: SDUPTHER

## 2024-11-26 RX ORDER — CALCIUM CARBONATE 200(500)MG
500 TABLET,CHEWABLE ORAL 3 TIMES DAILY PRN
Status: DISCONTINUED | OUTPATIENT
Start: 2024-11-26 | End: 2024-11-28

## 2024-11-26 RX ORDER — FENTANYL CITRATE 50 UG/ML
100 INJECTION, SOLUTION INTRAMUSCULAR; INTRAVENOUS
Status: DISCONTINUED | OUTPATIENT
Start: 2024-11-26 | End: 2024-11-27

## 2024-11-26 RX ORDER — OXYTOCIN-SODIUM CHLORIDE 0.9% IV SOLN 30 UNIT/500ML 30-0.9/5 UT/ML-%
10 SOLUTION INTRAVENOUS ONCE AS NEEDED
Status: DISCONTINUED | OUTPATIENT
Start: 2024-11-26 | End: 2024-11-28 | Stop reason: CLARIF

## 2024-11-26 RX ORDER — METHYLERGONOVINE MALEATE 0.2 MG/ML
200 INJECTION INTRAVENOUS ONCE AS NEEDED
Status: DISCONTINUED | OUTPATIENT
Start: 2024-11-26 | End: 2024-11-28 | Stop reason: SDUPTHER

## 2024-11-26 RX ADMIN — NIFEDIPINE 30 MG: 30 TABLET, FILM COATED, EXTENDED RELEASE ORAL at 08:11

## 2024-11-26 RX ADMIN — CETIRIZINE HYDROCHLORIDE 5 MG: 5 TABLET ORAL at 08:11

## 2024-11-26 RX ADMIN — NIFEDIPINE 30 MG: 30 TABLET, FILM COATED, EXTENDED RELEASE ORAL at 09:11

## 2024-11-26 RX ADMIN — FLUTICASONE PROPIONATE 100 MCG: 50 SPRAY, METERED NASAL at 08:11

## 2024-11-26 NOTE — PROGRESS NOTES
[facility] ([facility])  Obstetrics  Antepartum Progress Note    Patient Name: Maria De Jesus Payne  MRN: [id number]  Admission Date: 11/13/2024  Hospital Length of Stay: 8 days  Attending Physician: Jasson Pineda MD  Primary Care Provider: Abena, Primary Doctor    Subjective:     Principal Problem:Severe pre-eclampsia in third trimester    HPI:  20 yo G1 with IUP at 32w0d.  Current pregnancy complicated by GHTN.  Pt was sent from clinic secondary to elevated BP in clinic.  Pt denies headaches or vision changes on admission.  Has noted some swelling.  Otherwise no complaints.    Hospital Course:  Pt admitted to OB service with preeclampsia.  Urine P/C ratio 0.53.  Labs wnl.  Ultrasound 11/13/24 consistent with IUGR (EFW 1480 g at the 10th%, AC 3rd%), UA doppler wnl  BMZ on 11/13 and 11/14 11/14/24: HD#2, asymptomatic.  BP wnl.  Pt to complete BMZ series today  11/15/24:  Blood pressures elevated to severe range.  Magnesium sulfate given.  11/16/24: Blood pressures normal to mild range today. Episode of shortness of breath, fatigue, diminished reflexes, mag stopped, Mag level 5.8, restarted magnesium at 1g/hr  11/17/24 - Procardia XL 30 mg BID initiated for blood pressure control.  Blood pressures stable.  Patient asymptomatic.  11/18/24: BP stable on current regimen. JAD 12.8. MVP 4.9 cm. Breech presentation. No evidence of previa. Anterior placenta. Biophysical profile score 8/8   11/19/24--tacchycardia, shortness of breath and chest pain, neg CXR, elevated d dimer, neg resp panel, neg CTA-p; small pleural effusion; chest pain improved with ativan;   11/21/24--BPP 8/8, breech, umbilical artery dopplers wnl  11/23/24--denies preE symtpoms, bp stable on procardia 30mg bid  11/24/24-denies preE stympoms, bp elevated on procardia 30bid, but no severe range pressures  11/25/24- Denies preE symptoms, continue procardia 30BID, BPP 8/8, vertex, dopplers normal, pre-eclampsia labs stable, no severe range blood  pressures.;     Obstetric HPI:  Patient reports None contractions, active fetal movement, absent vaginal bleeding , absent loss of fluid      Objective:     Vital Signs (Most Recent):  Temp: 98.7 °F (37.1 °C) (24 0850)  Pulse: 105 (24 0850)  Resp: 20 (24 0850)  BP: (!) 139/97 (24 0850)  SpO2: 98 % (24 1611) Vital Signs (24h Range):  Temp:  [98 °F (36.7 °C)-99.1 °F (37.3 °C)] 98.7 °F (37.1 °C)  Pulse:  [103-111] 105  Resp:  [17-20] 20  SpO2:  [98 %] 98 %  BP: (129-161)/(76-97) 139/97        There is no height or weight on file to calculate BMI.    FHT: to be monitored later this am  No intake or output data in the 24 hours ending 24 0947    Cervical Exam:dererred     Significant Labs:  Recent Lab Results       None            Physical Exam:   Constitutional: She is oriented to person, place, and time. She appears well-developed and well-nourished.    HENT:   Head: Normocephalic.    Eyes: Pupils are equal, round, and reactive to light. Conjunctivae are normal.     Cardiovascular:  Normal rate and regular rhythm.             Pulmonary/Chest: Effort normal.        Abdominal: Soft.     Genitourinary:    Genitourinary Comments: Gravid, non tender             Musculoskeletal: Normal range of motion and moves all extremeties. No edema.       Neurological: She is alert and oriented to person, place, and time. She has normal reflexes.    Skin: Skin is warm and dry.    Psychiatric: She has a normal mood and affect. Her behavior is normal. Judgment and thought content normal.       Review of Systems  Assessment/Plan:     19 y.o. female  at 33w6d for:    * Severe pre-eclampsia in third trimester  -Urine P/C ratio 0.53 on   - S/P magnesium sulfate x 24 hours.  - Blood pressures well controlled on procardia XL 30 mg BID  - BMZ series complete  - In the setting of preeclampsia with severe features and IUGR, will plan inpatient surveillance until delivery with weekly labs (q Monday),  BPP q Monday/Thursday, and UA Dopplers q Monday.  Plan delivery at 34 weeks EGA or sooner as indicated for maternal or fetal indications.    HD #10  Denies preE symtpoms  Bp stable on procardia 30 bid  S/p magnesium sulfate; s/p bmz series  Continue nst bid  Bpp due on Thursday; u/a with dopplers on Monday  Repeat labs on Monday  Delivery at 34 wks or sooner for maternal or fetal indications    HD #11  33w3d  Denies preE symptoms  Bp stable on procardia 30mg bid  Continue nst bid  Bpp/dopplers labs (cbc, cmp) due in 2 days  Deliver at 34 wk; csection if remains breech    HD #12  33w4d  Denies preE symtpoms  Cotninue procardia 30 bid  Continue nst bid  Bpp /dopplers due tomorrow; cbc, cmp, t&s tomorrow  C/s at 34 w (if remains breech) or deliver sooner for maternal or fetal indications    11/25/24  No issues, BPP 8/8, normal dopplers, CBC, CMP stable, T&S obtained  Will continue to monitor fetal and maternal status for changes  Will increase Procardia if patient consistently close to severe range.  11/26/2024  HD #14  33w6d  Denies preE symptoms  Cotninue procardia 30 bid  Continue nst bid  Induction tomorrow      Nasal sinus congestion  11/23/2024  Add zyrtec and flonase  11/23/2024  Feels better  Continue zyrtec/flonase    Tachycardia  11/22/2024  Negative EKG, chest xray, cta    Poor fetal growth affecting management of mother in third trimester  US on 11/13 - 1480 g (10%), AC 3%, BPP 8/8, MVP 7.6, S:D nL, cephalic  BPP 2x/week with UA dopplers weekly  Growth u/s q 3 weeks  Nst bid    11/25/24  BPP 8/8, normal dopplers          Sparkle Mclain MD  Obstetrics  [facility] ([facility])

## 2024-11-26 NOTE — ASSESSMENT & PLAN NOTE
-Urine P/C ratio 0.53 on 11/13  - S/P magnesium sulfate x 24 hours.  - Blood pressures well controlled on procardia XL 30 mg BID  - BMZ series complete  - In the setting of preeclampsia with severe features and IUGR, will plan inpatient surveillance until delivery with weekly labs (q Monday), BPP q Monday/Thursday, and UA Dopplers q Monday.  Plan delivery at 34 weeks EGA or sooner as indicated for maternal or fetal indications.    HD #10  Denies preE symtpoms  Bp stable on procardia 30 bid  S/p magnesium sulfate; s/p bmz series  Continue nst bid  Bpp due on Thursday; u/a with dopplers on Monday  Repeat labs on Monday  Delivery at 34 wks or sooner for maternal or fetal indications    HD #11  33w3d  Denies preE symptoms  Bp stable on procardia 30mg bid  Continue nst bid  Bpp/dopplers labs (cbc, cmp) due in 2 days  Deliver at 34 wk; csection if remains breech    HD #12  33w4d  Denies preE symtpoms  Cotninue procardia 30 bid  Continue nst bid  Bpp /dopplers due tomorrow; cbc, cmp, t&s tomorrow  C/s at 34 w (if remains breech) or deliver sooner for maternal or fetal indications    11/25/24  No issues, BPP 8/8, normal dopplers, CBC, CMP stable, T&S obtained  Will continue to monitor fetal and maternal status for changes  Will increase Procardia if patient consistently close to severe range.  11/26/2024  HD #14  33w6d  Denies preE symptoms  Cotninue procardia 30 bid  Continue nst bid  Induction tomorrow

## 2024-11-26 NOTE — PLAN OF CARE
Plan of care reviewed. Vital signs stable. Tolerating diet. Voiding and ambulating independently. Mom speaks positively about hospital stay. Will continue to monitor.

## 2024-11-26 NOTE — PROGRESS NOTES
Inpatient Nutrition Assessment    Admit Date: 11/13/2024   Total duration of encounter: 13 days   Patient Age: 19 y.o.    Nutrition Recommendation/Prescription     Continue regular diet as tolerated.  Continue Boost TID for additional kcal/protein.  Weekly weights.  Will monitor % intake meals + supplements, weight trends, labs.    Communication of Recommendations: reviewed with patient    Nutrition Assessment     Malnutrition Assessment/Nutrition-Focused Physical Exam       Malnutrition Level: other (see comments) (does not meet criteria) (11/26/24 1112)  Energy Intake (Malnutrition): other (see comments) (does not meet criteria) (11/26/24 1112)  Weight Loss (Malnutrition): other (see comments) (does not meet criteria) (11/26/24 1112)  Subcutaneous Fat (Malnutrition): other (see comments) (unable to assess - dietitian working remotely) (11/26/24 1112)           Muscle Mass (Malnutrition): other (see comments) (unable to assess - dietitian working remotely) (11/26/24 1112)                          Fluid Accumulation (Malnutrition): mild (per flowsheets) (11/26/24 1112)        A minimum of two characteristics is recommended for diagnosis of either severe or non-severe malnutrition.    Chart Review    Reason Seen: length of stay    Malnutrition Screening Tool Results   Have you recently lost weight without trying?: No  Have you been eating poorly because of a decreased appetite?: No   MST Score: 0   Diagnosis:  Severe pre-eclampsia in third trimester  Nasal sinus congestion  Tachycardia  Poor fetal growth affecting management of mother in third trimester    Relevant Medical History: irregular menstrual cycle    Scheduled Medications:  cetirizine, 5 mg, Daily  ferrous sulfate, 1 tablet, BID  fluticasone propionate, 2 spray, Daily  NIFEdipine, 30 mg, BID  prenatal vitamin, 1 tablet, Daily    Continuous Infusions:   PRN Medications:  acetaminophen, 1,000 mg, Q8H PRN  calcium gluconate, 1 g, PRN  diphenhydrAMINE, 25 mg,  Q4H PRN  diphenhydrAMINE, 25 mg, Q4H PRN  LORazepam, 1 mg, Q6H PRN  ondansetron, 8 mg, Q8H PRN  senna-docusate 8.6-50 mg, 1 tablet, Nightly PRN  simethicone, 1 tablet, Q6H PRN  sodium chloride 0.9%, 10 mL, PRN    Calorie Containing IV Medications: no significant kcals from medications at this time    Recent Labs   Lab 11/25/24  0559      K 4.1   CALCIUM 9.1      CO2 20*   BUN 8   CREATININE 0.8   EGFRNORACEVR >60   BILITOT 0.1   ALKPHOS 425*   ALT 23   AST 21   ALBUMIN 2.3*   WBC 10.19   HGB 12.6   HCT 38.7     Nutrition Orders:  Diet Adult Regular Standard Tray  Dietary nutrition supplements Boost Plus Nutritional Drink - Any flavor,Dietary nutrition supplements Boost Plus Nutritional Drink - Any flavor    Appetite/Oral Intake: good/% of meals  Factors Affecting Nutritional Intake: none identified  Social Needs Impacting Access to Food: none identified  Food/Yazdanism/Cultural Preferences: none reported  Food Allergies: no known food allergies  Last Bowel Movement: 11/24/24  Wound(s):  no pressure injuries per EMR    Comments    11/26/24: Dietitian working remotely. Able to speak with pt via phone. Reports good appetite/intake. Has Boost ordered TID but states she has not been receiving it, requests chocolate flavor - will verify order has crossed over into [employer]. Pre-pregnancy weight was 161 lb.    Anthropometrics       Height: 66 in     Weight: 90.3 kg (199 lb)   BMI: 32.13   BMI Classification: obese grade I (BMI 30-34.9)                                         Usual Weight Provided By: patient    Wt Readings from Last 5 Encounters:   11/13/24 90.3 kg (199 lb 1.2 oz)   10/29/24 87 kg (191 lb 12.8 oz)   10/18/24 86.6 kg (191 lb)   10/18/24 86.1 kg (189 lb 13.1 oz)   10/14/24 84.7 kg (186 lb 11.7 oz)     Weight Change(s) Since Admission:   Wt Readings from Last 1 Encounters:   11/13/24 1457 90.3 kg (199 lb 1.2 oz)   Admit  ,      Estimated Needs    Weight Used For Calorie Calculations: 73 kg  (161 lb) (pre-gravid weight)  Energy Calorie Requirements (kcal): 3874-8143 kcal (EER + 452 kcal for 3rd trimester)  Energy Need Method: other (see comments) (per NCM calculator/recommendations)  Weight Used For Protein Calculations: 73 kg (161 lb) (pre-gravid weight)  Protein Requirements: 105-115 g (1.5 g/kg)  Fluid Requirements (mL): 0804-1640 mL (35 mL/kg pre-gravid weight)  CHO Requirement: 300 g (45% of estimated needs)     Enteral Nutrition     Patient not receiving enteral nutrition at this time.    Parenteral Nutrition     Patient not receiving parenteral nutrition support at this time.    Evaluation of Received Nutrient Intake    Calories: not meeting estimated needs  Protein: not meeting estimated needs    Patient Education     Not applicable.    Nutrition Diagnosis     PES: Increased nutrient needs (kcal, protein) related to  increased nutrient demand as evidenced by pregnancy, third-trimester. (new)         Nutrition Interventions     Intervention(s): general/healthful diet, commercial beverage, multivitamin/mineral supplement therapy, and collaboration with other providers      Goal: Meet greater than 80% of nutritional needs by follow-up. (new)  Goal: Consume % of oral supplements by follow-up. (new)    Nutrition Goals & Monitoring     Dietitian will monitor: energy intake and weight change  Discharge planning: continue regular diet with Boost oral supplements  Nutrition Risk/Follow-Up: low (follow-up in 5-7 days)   Please consult if re-assessment needed sooner.

## 2024-11-26 NOTE — SUBJECTIVE & OBJECTIVE
Obstetric HPI:  Patient reports None contractions, active fetal movement, absent vaginal bleeding , absent loss of fluid      Objective:     Vital Signs (Most Recent):  Temp: 98.7 °F (37.1 °C) (11/26/24 0850)  Pulse: 105 (11/26/24 0850)  Resp: 20 (11/26/24 0850)  BP: (!) 139/97 (11/26/24 0850)  SpO2: 98 % (11/25/24 1611) Vital Signs (24h Range):  Temp:  [98 °F (36.7 °C)-99.1 °F (37.3 °C)] 98.7 °F (37.1 °C)  Pulse:  [103-111] 105  Resp:  [17-20] 20  SpO2:  [98 %] 98 %  BP: (129-161)/(76-97) 139/97        There is no height or weight on file to calculate BMI.    FHT: to be monitored later this am  No intake or output data in the 24 hours ending 11/26/24 0947    Cervical Exam:dererred     Significant Labs:  Recent Lab Results       None            Physical Exam:   Constitutional: She is oriented to person, place, and time. She appears well-developed and well-nourished.    HENT:   Head: Normocephalic.    Eyes: Pupils are equal, round, and reactive to light. Conjunctivae are normal.     Cardiovascular:  Normal rate and regular rhythm.             Pulmonary/Chest: Effort normal.        Abdominal: Soft.     Genitourinary:    Genitourinary Comments: Gravid, non tender             Musculoskeletal: Normal range of motion and moves all extremeties. No edema.       Neurological: She is alert and oriented to person, place, and time. She has normal reflexes.    Skin: Skin is warm and dry.    Psychiatric: She has a normal mood and affect. Her behavior is normal. Judgment and thought content normal.       Review of Systems

## 2024-11-26 NOTE — PLAN OF CARE
Nutrition Recommendations/Interventions on 11/26/24:  Continue regular diet as tolerated.  Continue Boost TID for additional kcal/protein.  Weekly weights.  Will monitor % intake meals + supplements, weight trends, labs.    Goals:   Goal: Meet greater than 80% of nutritional needs by follow-up. (new)  Goal: Consume % of oral supplements by follow-up. (new)    Harini Whitfield RD, LDN, CNSC

## 2024-11-26 NOTE — PLAN OF CARE
Patient afebrile and had no falls this shift. Denies any loss of fluid vaginally, denies contractions/abdominal pain. Voids spontaneously. Ambulates independently. Pain well controlled with oral pain medication. Vital signs stable at this time. Speaks positively of pregnancy. Plan is to go to [facility] for ultrasound to check baby's position and IOL tonight vs C/S delivery in the morning per JONO Travis. Will continue to monitor.

## 2024-11-27 PROCEDURE — 25000003 PHARM REV CODE 250: Performed by: ADVANCED PRACTICE MIDWIFE

## 2024-11-27 PROCEDURE — 25000003 PHARM REV CODE 250: Performed by: OBSTETRICS & GYNECOLOGY

## 2024-11-27 PROCEDURE — 72100003 HC LABOR CARE, EA. ADDL. 8 HRS

## 2024-11-27 PROCEDURE — 11000001 HC ACUTE MED/SURG PRIVATE ROOM

## 2024-11-27 RX ORDER — FENTANYL CITRATE 50 UG/ML
50 INJECTION, SOLUTION INTRAMUSCULAR; INTRAVENOUS
Status: DISCONTINUED | OUTPATIENT
Start: 2024-11-27 | End: 2024-11-28

## 2024-11-27 RX ORDER — FENTANYL CITRATE 50 UG/ML
100 INJECTION, SOLUTION INTRAMUSCULAR; INTRAVENOUS
Status: DISCONTINUED | OUTPATIENT
Start: 2024-11-27 | End: 2024-11-28

## 2024-11-27 RX ORDER — HYDROXYZINE PAMOATE 25 MG/1
50 CAPSULE ORAL NIGHTLY PRN
Status: DISCONTINUED | OUTPATIENT
Start: 2024-11-27 | End: 2024-11-27

## 2024-11-27 RX ADMIN — MISOPROSTOL 25 MCG: 100 TABLET ORAL at 01:11

## 2024-11-27 RX ADMIN — NIFEDIPINE 30 MG: 30 TABLET, FILM COATED, EXTENDED RELEASE ORAL at 09:11

## 2024-11-27 RX ADMIN — NIFEDIPINE 30 MG: 30 TABLET, FILM COATED, EXTENDED RELEASE ORAL at 08:11

## 2024-11-27 RX ADMIN — MISOPROSTOL 25 MCG: 100 TABLET ORAL at 09:11

## 2024-11-27 RX ADMIN — DINOPROSTONE 10 MG: 10 INSERT VAGINAL at 07:11

## 2024-11-27 RX ADMIN — MISOPROSTOL 25 MCG: 100 TABLET ORAL at 04:11

## 2024-11-27 RX ADMIN — CETIRIZINE HYDROCHLORIDE 5 MG: 5 TABLET ORAL at 09:11

## 2024-11-27 RX ADMIN — HYDROXYZINE PAMOATE 100 MG: 25 CAPSULE ORAL at 12:11

## 2024-11-27 RX ADMIN — MISOPROSTOL 25 MCG: 100 TABLET ORAL at 12:11

## 2024-11-27 NOTE — PROGRESS NOTES
S: voices no complaints at present  SO at bedside and supportive  O:  VS reviewed, afebrile   Vitals:    11/27/24 1436 11/27/24 1500 11/27/24 1600 11/27/24 1700   BP:  134/80 (!) 140/84 (!) 141/96   Pulse: 109 (!) 116 (!) 123 (!) 115   Resp:   16    Temp:   99.1 °F (37.3 °C)    TempSrc:   Oral    SpO2: 100% 99% 99% 99%       FHTs:  CAT 1 reassuring with moderate variability  UC: Occasional  SVE: 1/thick/high remains VTX per bedside unit ultrasound    A: IUP @ 34w0d ;     Patient Active Problem List   Diagnosis    Severe pre-eclampsia in third trimester    Poor fetal growth affecting management of mother in third trimester    Tachycardia    Nasal sinus congestion       P: Cervidil  Continue close monitoring of maternal/fetal status

## 2024-11-27 NOTE — SUBJECTIVE & OBJECTIVE
Interval History:  Maria De Jesus is a 19 y.o.  at 34w0d. She is doing well.     Objective:     Vital Signs (Most Recent):  Temp: 98.6 °F (37 °C) (24)  Pulse: 105 (24)  Resp: 18 (24)  BP: (!) 145/97 (24)  SpO2: 99 % (24) Vital Signs (24h Range):  Temp:  [98.3 °F (36.8 °C)-98.7 °F (37.1 °C)] 98.6 °F (37 °C)  Pulse:  [] 105  Resp:  [16-20] 18  SpO2:  [84 %-100 %] 99 %  BP: (119-151)/() 145/97        There is no height or weight on file to calculate BMI.    FHT: Cat 1 (reassuring)  TOCO:  irregular    Cervical Exam:   per RN @ 0930     Significant Labs:  Lab Results   Component Value Date    GROUPTRH O POS 2024    HEPBSAG Non-reactive 2024       I have personallly reviewed all pertinent lab results from the last 24 hours.    Physical Exam:   Constitutional: She is oriented to person, place, and time. She appears well-developed and well-nourished.        Pulmonary/Chest: Effort normal.        Abdominal: Soft.     Genitourinary:    Uterus normal.             Musculoskeletal: Normal range of motion and moves all extremeties.       Neurological: She is alert and oriented to person, place, and time.    Skin: Skin is warm and dry.    Psychiatric: She has a normal mood and affect. Her behavior is normal. Judgment and thought content normal.       Review of Systems

## 2024-11-27 NOTE — PROGRESS NOTES
[facility]  Obstetrics  Labor Progress Note    Patient Name: Maria De Jesus Payne  MRN: [id number]  Admission Date: 11/13/2024  Hospital Length of Stay: 9 days  Attending Physician: Jasson Pineda MD  Primary Care Provider: Abena, Primary Doctor    Subjective:     Principal Problem:Severe pre-eclampsia in third trimester    Hospital Course:  Pt admitted to OB service with preeclampsia.  Urine P/C ratio 0.53.  Labs wnl.  Ultrasound 11/13/24 consistent with IUGR (EFW 1480 g at the 10th%, AC 3rd%), UA doppler wnl  BMZ on 11/13 and 11/14 11/14/24: HD#2, asymptomatic.  BP wnl.  Pt to complete BMZ series today  11/15/24:  Blood pressures elevated to severe range.  Magnesium sulfate given.  11/16/24: Blood pressures normal to mild range today. Episode of shortness of breath, fatigue, diminished reflexes, mag stopped, Mag level 5.8, restarted magnesium at 1g/hr  11/17/24 - Procardia XL 30 mg BID initiated for blood pressure control.  Blood pressures stable.  Patient asymptomatic.  11/18/24: BP stable on current regimen. JAD 12.8. MVP 4.9 cm. Breech presentation. No evidence of previa. Anterior placenta. Biophysical profile score 8/8 11/19/24--tacchycardia, shortness of breath and chest pain, neg CXR, elevated d dimer, neg resp panel, neg CTA-p; small pleural effusion; chest pain improved with ativan;   11/21/24--BPP 8/8, breech, umbilical artery dopplers wnl  11/23/24--denies preE symtpoms, bp stable on procardia 30mg bid  11/24/24-denies preE stympoms, bp elevated on procardia 30bid, but no severe range pressures  11/25/24- Denies preE symptoms, continue procardia 30BID, BPP 8/8, vertex, dopplers normal, pre-eclampsia labs stable, no severe range blood pressures.;   11/26/24:Doing well today. Denies any s/s of pre-e. Continue procardia. Bedside US performed, vertex presentation. Cervix FT/Thick/hard. Will plan to start IOL to start with cytotec. Dicussed POC with MD and patient. All agreeable. GBS not  tested, so will do PPX treatment with Pcn.   24 Continue with Cytotec PO Q 4 hr    Interval History:  Maria De Jesus is a 19 y.o.  at 34w0d. She is doing well.     Objective:     Vital Signs (Most Recent):  Temp: 98.6 °F (37 °C) (24)  Pulse: 105 (24)  Resp: 18 (24)  BP: (!) 145/97 (24)  SpO2: 99 % (24) Vital Signs (24h Range):  Temp:  [98.3 °F (36.8 °C)-98.7 °F (37.1 °C)] 98.6 °F (37 °C)  Pulse:  [] 105  Resp:  [16-20] 18  SpO2:  [84 %-100 %] 99 %  BP: (119-151)/() 145/97        There is no height or weight on file to calculate BMI.    FHT: Cat 1 (reassuring)  TOCO:  irregular    Cervical Exam:   per RN @ 0930     Significant Labs:  Lab Results   Component Value Date    GROUPTRH O POS 2024    HEPBSAG Non-reactive 2024       I have personallly reviewed all pertinent lab results from the last 24 hours.    Physical Exam:   Constitutional: She is oriented to person, place, and time. She appears well-developed and well-nourished.        Pulmonary/Chest: Effort normal.        Abdominal: Soft.     Genitourinary:    Uterus normal.             Musculoskeletal: Normal range of motion and moves all extremeties.       Neurological: She is alert and oriented to person, place, and time.    Skin: Skin is warm and dry.    Psychiatric: She has a normal mood and affect. Her behavior is normal. Judgment and thought content normal.       Review of Systems  Assessment/Plan:     19 y.o. female  at 34w0d for:    * Severe pre-eclampsia in third trimester  -Urine P/C ratio 0.53 on   - S/P magnesium sulfate x 24 hours.  - Blood pressures well controlled on procardia XL 30 mg BID  - BMZ series complete  - In the setting of preeclampsia with severe features and IUGR, will plan inpatient surveillance until delivery with weekly labs (q Monday), BPP q Monday/Thursday, and UA Dopplers q Monday.  Plan delivery at 34 weeks EGA or sooner as indicated  for maternal or fetal indications.    HD #10  Denies preE symtpoms  Bp stable on procardia 30 bid  S/p magnesium sulfate; s/p bmz series  Continue nst bid  Bpp due on Thursday; u/a with dopplers on Monday  Repeat labs on Monday  Delivery at 34 wks or sooner for maternal or fetal indications    HD #11  33w3d  Denies preE symptoms  Bp stable on procardia 30mg bid  Continue nst bid  Bpp/dopplers labs (cbc, cmp) due in 2 days  Deliver at 34 wk; csection if remains breech    HD #12  33w4d  Denies preE symtpoms  Cotninue procardia 30 bid  Continue nst bid  Bpp /dopplers due tomorrow; cbc, cmp, t&s tomorrow  C/s at 34 w (if remains breech) or deliver sooner for maternal or fetal indications    11/25/24  No issues, BPP 8/8, normal dopplers, CBC, CMP stable, T&S obtained  Will continue to monitor fetal and maternal status for changes  Will increase Procardia if patient consistently close to severe range.  11/26/2024  HD #14  33w6d  Denies preE symptoms  Cotninue procardia 30 bid  Continue nst bid  Induction tomorrow  11/26/24:Doing well today. Denies any s/s of pre-e. Continue procardia. Bedside US performed, vertex presentation. Cervix FT/Thick/hard. Will plan to start IOL to start with cytotec. Dicussed POC with MD and patient. All agreeable. GBS not tested, so will do PPX treatment with Pcn.       Nasal sinus congestion  11/23/2024  Add zyrtec and flonase  11/23/2024  Feels better  Continue zyrtec/flonase    Tachycardia  11/22/2024  Negative EKG, chest xray, cta    Poor fetal growth affecting management of mother in third trimester  US on 11/13 - 1480 g (10%), AC 3%, BPP 8/8, MVP 7.6, S:D nL, cephalic  BPP 2x/week with UA dopplers weekly  Growth u/s q 3 weeks  Nst bid    11/25/24  BPP 8/8, normal dopplers          Lissy Duncan CNM  Obstetrics  [facility]

## 2024-11-27 NOTE — ASSESSMENT & PLAN NOTE
-Urine P/C ratio 0.53 on 11/13  - S/P magnesium sulfate x 24 hours.  - Blood pressures well controlled on procardia XL 30 mg BID  - BMZ series complete  - In the setting of preeclampsia with severe features and IUGR, will plan inpatient surveillance until delivery with weekly labs (q Monday), BPP q Monday/Thursday, and UA Dopplers q Monday.  Plan delivery at 34 weeks EGA or sooner as indicated for maternal or fetal indications.    HD #10  Denies preE symtpoms  Bp stable on procardia 30 bid  S/p magnesium sulfate; s/p bmz series  Continue nst bid  Bpp due on Thursday; u/a with dopplers on Monday  Repeat labs on Monday  Delivery at 34 wks or sooner for maternal or fetal indications    HD #11  33w3d  Denies preE symptoms  Bp stable on procardia 30mg bid  Continue nst bid  Bpp/dopplers labs (cbc, cmp) due in 2 days  Deliver at 34 wk; csection if remains breech    HD #12  33w4d  Denies preE symtpoms  Cotninue procardia 30 bid  Continue nst bid  Bpp /dopplers due tomorrow; cbc, cmp, t&s tomorrow  C/s at 34 w (if remains breech) or deliver sooner for maternal or fetal indications    11/25/24  No issues, BPP 8/8, normal dopplers, CBC, CMP stable, T&S obtained  Will continue to monitor fetal and maternal status for changes  Will increase Procardia if patient consistently close to severe range.  11/26/2024  HD #14  33w6d  Denies preE symptoms  Cotninue procardia 30 bid  Continue nst bid  Induction tomorrow  11/26/24:Doing well today. Denies any s/s of pre-e. Continue procardia. Bedside US performed, vertex presentation. Cervix FT/Thick/hard. Will plan to start IOL to start with cytotec. Dicussed POC with MD and patient. All agreeable. GBS not tested, so will do PPX treatment with Pcn.

## 2024-11-27 NOTE — DISCHARGE INSTRUCTIONS
"Mother Self Care:    Activity: Avoid strenuous exercise and get adequate rest.  No driving until the physician consent given.  Emotional Changes: Most women find birth to be a time of great emotional upheaval.  Sense of loss, mood swings, fatigue, anxiety, and feeling "let down" are common.  If feelings worsen or last more than a week, call your physician.  Breast Care/Breastfeeding: Wear a bra for comfort.  Keep nipples dry and apply your own breast milk or lanolin cream as needed for soreness.  Engorgement can be relieved with warm, moist heat before feedings.  You may also take Ibuprofen.  Breast Care/Bottle Feeding: Wear support bra 24 hours a day for one week.  Avoid stimulation to breasts.  You may use ice packs for discomfort.  Cathie-Care/Vaginal Bleeding: Remember to use your cathie-bottle after urinating.  Your flow will change from red, to pink, to yellow/white color over a period of 2 weeks.  Menstruation will return in 3-8 weeks, or longer if breastfeeding. Avoid bubble baths or strong soaps.  Sexual Activity/Pelvic Rest: No sexual activity, tampons, or douching until your physician gives you consent.  Diet: Continue to eat from the five basic food groups, including plenty of protein, fruits, vegetables, and whole grains.  Limit empty calories and high fat foods.  Drink enough fluids to satisfy thirst and add an extra 500 calories for breastfeeding.  Constipation/Hemorrhoids: Drink plenty of water.  You may take a stool softener or natural laxative (Metamucil). You may use tucks or hemorrhoid ointment and soak in a warm tub.    "What does help look like to you when you go home?"  "Is there any need that you anticipate that we may be able to assist with?"    CALL YOUR OB DOCTOR IF ANY OF THE FOLLOWING OCCURS:  *Heavy bleeding - saturating a pad an hour or passing any large (2-3 inches in size) blood clots.  *Any pain, redness, or tenderness in lower leg.  *You cannot care for yourself or your baby.  *Any " signs of infection-      - Temperature greater than 100.5 degrees F      - Foul smelling vaginal discharge and/or incisional drainage      - Increased episiotomy or incisional pain      - Hot, hard, red or sore area on breast      - Flu-like symptoms      - Any urgency, frequency or burning with urination    Return To the Hospital for further Evaluation:  Headache not relieved by tylenol or ibuprofen  Blurry vision, double vision, seeing spots, or flashing lights  Feeling faint or passing out  Right epigastric pain  Difficulty breathing  Swelling in hands, face, or feet  Any of these symptoms accompanied by nausea/vomiting  Gaining more than 5 pounds in one week  Seizures  These symptoms could be an indication of elevated blood pressure.     For patients that were treated for high blood pressure during pregnancy and or your hospital stay you will need a blood pressure check three days after you leave the hospital. Your nursing staff will assist you in an appointment if needed. If you have Connected Mom you may use your blood pressure cuff and report any readings 140/90 to your provider immediately.       If you have any questions that need to be answered immediately please call the Labor & Delivery Unit at [phone] and ask to speak to a nurse.      Please see [facility] folder for additional information and handouts.

## 2024-11-27 NOTE — PROGRESS NOTES
[facility] - Labor & Delivery  Obstetrics  Labor Progress Note    Patient Name: Maria De Jesus Payne  MRN: [id number]  Admission Date: 11/13/2024  Hospital Length of Stay: 8 days  Attending Physician: Jasson Pineda MD  Primary Care Provider: Abena, Primary Doctor    Subjective:     Principal Problem:Severe pre-eclampsia in third trimester    Hospital Course:  Pt admitted to OB service with preeclampsia.  Urine P/C ratio 0.53.  Labs wnl.  Ultrasound 11/13/24 consistent with IUGR (EFW 1480 g at the 10th%, AC 3rd%), UA doppler wnl  BMZ on 11/13 and 11/14 11/14/24: HD#2, asymptomatic.  BP wnl.  Pt to complete BMZ series today  11/15/24:  Blood pressures elevated to severe range.  Magnesium sulfate given.  11/16/24: Blood pressures normal to mild range today. Episode of shortness of breath, fatigue, diminished reflexes, mag stopped, Mag level 5.8, restarted magnesium at 1g/hr  11/17/24 - Procardia XL 30 mg BID initiated for blood pressure control.  Blood pressures stable.  Patient asymptomatic.  11/18/24: BP stable on current regimen. JAD 12.8. MVP 4.9 cm. Breech presentation. No evidence of previa. Anterior placenta. Biophysical profile score 8/8 11/19/24--tacchycardia, shortness of breath and chest pain, neg CXR, elevated d dimer, neg resp panel, neg CTA-p; small pleural effusion; chest pain improved with ativan;   11/21/24--BPP 8/8, breech, umbilical artery dopplers wnl  11/23/24--denies preE symtpoms, bp stable on procardia 30mg bid  11/24/24-denies preE stympoms, bp elevated on procardia 30bid, but no severe range pressures  11/25/24- Denies preE symptoms, continue procardia 30BID, BPP 8/8, vertex, dopplers normal, pre-eclampsia labs stable, no severe range blood pressures.;   11/26/24:Doing well today. Denies any s/s of pre-e. Continue procardia. Bedside US performed, vertex presentation. Cervix FT/Thick/hard. Will plan to start IOL to start with cytotec. Dicussed POC with MD and patient. All agreeable. GBS not  tested, so will do PPX treatment with Pcn.     Interval History:  Maria De Jesus is a 19 y.o.  at 33w6d. She is doing well.     Objective:     Vital Signs (Most Recent):  Temp: 98.3 °F (36.8 °C) (24 1552)  Pulse: 100 (24 1607)  Resp: 20 (24 1552)  BP: (!) 144/94 (24 1607)  SpO2: 98 % (24 1611) Vital Signs (24h Range):  Temp:  [98.3 °F (36.8 °C)-98.9 °F (37.2 °C)] 98.3 °F (36.8 °C)  Pulse:  [] 100  Resp:  [16-20] 20  BP: (130-161)/() 144/94        There is no height or weight on file to calculate BMI.    FHT: will place on monitors at 2315  TOCO:  none    Cervical Exam:  Dilation:  0  Effacement:  50%  Station: -3  Presentation: Vertex     Significant Labs:  Lab Results   Component Value Date    GROUPTRH O POS 2024    HEPBSAG Non-reactive 2024       I have personallly reviewed all pertinent lab results from the last 24 hours.    Physical Exam:   Constitutional: She is oriented to person, place, and time. She appears well-developed and well-nourished.    HENT:   Head: Normocephalic.      Cardiovascular:  Normal rate and regular rhythm.             Pulmonary/Chest: Effort normal.        Abdominal: Soft.     Genitourinary:    Vagina and uterus normal.             Musculoskeletal: Normal range of motion and moves all extremeties.       Neurological: She is alert and oriented to person, place, and time. She has normal reflexes.    Skin: Skin is warm and dry.        Review of Systems  Assessment/Plan:     19 y.o. female  at 33w6d for:    * Severe pre-eclampsia in third trimester  -Urine P/C ratio 0.53 on   - S/P magnesium sulfate x 24 hours.  - Blood pressures well controlled on procardia XL 30 mg BID  - BMZ series complete  - In the setting of preeclampsia with severe features and IUGR, will plan inpatient surveillance until delivery with weekly labs (q Monday), BPP q Monday/Thursday, and UA Dopplers q Monday.  Plan delivery at 34 weeks EGA or sooner as  indicated for maternal or fetal indications.    HD #10  Denies preE symtpoms  Bp stable on procardia 30 bid  S/p magnesium sulfate; s/p bmz series  Continue nst bid  Bpp due on Thursday; u/a with dopplers on Monday  Repeat labs on Monday  Delivery at 34 wks or sooner for maternal or fetal indications    HD #11  33w3d  Denies preE symptoms  Bp stable on procardia 30mg bid  Continue nst bid  Bpp/dopplers labs (cbc, cmp) due in 2 days  Deliver at 34 wk; csection if remains breech    HD #12  33w4d  Denies preE symtpoms  Cotninue procardia 30 bid  Continue nst bid  Bpp /dopplers due tomorrow; cbc, cmp, t&s tomorrow  C/s at 34 w (if remains breech) or deliver sooner for maternal or fetal indications    11/25/24  No issues, BPP 8/8, normal dopplers, CBC, CMP stable, T&S obtained  Will continue to monitor fetal and maternal status for changes  Will increase Procardia if patient consistently close to severe range.  11/26/2024  HD #14  33w6d  Denies preE symptoms  Cotninue procardia 30 bid  Continue nst bid  Induction tomorrow  11/26/24:Doing well today. Denies any s/s of pre-e. Continue procardia. Bedside US performed, vertex presentation. Cervix FT/Thick/hard. Will plan to start IOL to start with cytotec. Dicussed POC with MD and patient. All agreeable. GBS not tested, so will do PPX treatment with Pcn.       Nasal sinus congestion  11/23/2024  Add zyrtec and flonase  11/23/2024  Feels better  Continue zyrtec/flonase    Tachycardia  11/22/2024  Negative EKG, chest xray, cta    Poor fetal growth affecting management of mother in third trimester  US on 11/13 - 1480 g (10%), AC 3%, BPP 8/8, MVP 7.6, S:D nL, cephalic  BPP 2x/week with UA dopplers weekly  Growth u/s q 3 weeks  Nst bid    11/25/24  BPP 8/8, normal dopplers          Angy Rushing CNM  Obstetrics  [facility] - Labor [facility] Delivery

## 2024-11-27 NOTE — PROGRESS NOTES
S: resting without complaints  O:  VS reviewed, afebrile   Vitals:    11/27/24 1143 11/27/24 1148 11/27/24 1153 11/27/24 1158   BP:       Pulse: 92 94 88 89   Resp:       Temp:       TempSrc:       SpO2: 98% 97% 97% 97%       FHTs:  CAT 1 reassuring, moderate variability  UC: Irregular  SVE 1.5 per RN, Cytotec dose # 4 placed    A: IUP @ 34w0d ;     Patient Active Problem List   Diagnosis    Severe pre-eclampsia in third trimester    Poor fetal growth affecting management of mother in third trimester    Tachycardia    Nasal sinus congestion       P: Cooks insert with next check at 1730  Continue close monitoring of maternal/fetal status

## 2024-11-27 NOTE — SUBJECTIVE & OBJECTIVE
Interval History:  Maria De Jesus is a 19 y.o.  at 33w6d. She is doing well.     Objective:     Vital Signs (Most Recent):  Temp: 98.3 °F (36.8 °C) (24 1552)  Pulse: 100 (24 1607)  Resp: 20 (24 1552)  BP: (!) 144/94 (24 1607)  SpO2: 98 % (24 1611) Vital Signs (24h Range):  Temp:  [98.3 °F (36.8 °C)-98.9 °F (37.2 °C)] 98.3 °F (36.8 °C)  Pulse:  [] 100  Resp:  [16-20] 20  BP: (130-161)/() 144/94        There is no height or weight on file to calculate BMI.    FHT: will place on monitors at 2315  TOCO:  none    Cervical Exam:  Dilation:  0  Effacement:  50%  Station: -3  Presentation: Vertex     Significant Labs:  Lab Results   Component Value Date    GROUPTRH O POS 2024    HEPBSAG Non-reactive 2024       I have personallly reviewed all pertinent lab results from the last 24 hours.    Physical Exam:   Constitutional: She is oriented to person, place, and time. She appears well-developed and well-nourished.    HENT:   Head: Normocephalic.      Cardiovascular:  Normal rate and regular rhythm.             Pulmonary/Chest: Effort normal.        Abdominal: Soft.     Genitourinary:    Vagina and uterus normal.             Musculoskeletal: Normal range of motion and moves all extremeties.       Neurological: She is alert and oriented to person, place, and time. She has normal reflexes.    Skin: Skin is warm and dry.        Review of Systems

## 2024-11-28 ENCOUNTER — ANESTHESIA EVENT (OUTPATIENT)
Dept: OBSTETRICS AND GYNECOLOGY | Facility: HOSPITAL | Age: 19
End: 2024-11-28
Payer: MEDICAID

## 2024-11-28 ENCOUNTER — ANESTHESIA (OUTPATIENT)
Dept: OBSTETRICS AND GYNECOLOGY | Facility: HOSPITAL | Age: 19
End: 2024-11-28
Payer: MEDICAID

## 2024-11-28 LAB — GROUP B STREPTOCOCCUS, PCR: NEGATIVE

## 2024-11-28 PROCEDURE — C1751 CATH, INF, PER/CENT/MIDLINE: HCPCS | Performed by: STUDENT IN AN ORGANIZED HEALTH CARE EDUCATION/TRAINING PROGRAM

## 2024-11-28 PROCEDURE — 25000003 PHARM REV CODE 250: Performed by: ADVANCED PRACTICE MIDWIFE

## 2024-11-28 PROCEDURE — 51702 INSERT TEMP BLADDER CATH: CPT

## 2024-11-28 PROCEDURE — 59409 OBSTETRICAL CARE: CPT | Mod: AT,,, | Performed by: ADVANCED PRACTICE MIDWIFE

## 2024-11-28 PROCEDURE — 88307 TISSUE EXAM BY PATHOLOGIST: CPT | Performed by: PATHOLOGY

## 2024-11-28 PROCEDURE — 62326 NJX INTERLAMINAR LMBR/SAC: CPT | Performed by: NURSE ANESTHETIST, CERTIFIED REGISTERED

## 2024-11-28 PROCEDURE — 59200 INSERT CERVICAL DILATOR: CPT

## 2024-11-28 PROCEDURE — 99900035 HC TECH TIME PER 15 MIN (STAT)

## 2024-11-28 PROCEDURE — 63600175 PHARM REV CODE 636 W HCPCS: Performed by: STUDENT IN AN ORGANIZED HEALTH CARE EDUCATION/TRAINING PROGRAM

## 2024-11-28 PROCEDURE — 63600175 PHARM REV CODE 636 W HCPCS: Performed by: OBSTETRICS & GYNECOLOGY

## 2024-11-28 PROCEDURE — 3E0P7VZ INTRODUCTION OF HORMONE INTO FEMALE REPRODUCTIVE, VIA NATURAL OR ARTIFICIAL OPENING: ICD-10-PCS | Performed by: ADVANCED PRACTICE MIDWIFE

## 2024-11-28 PROCEDURE — 63600175 PHARM REV CODE 636 W HCPCS: Performed by: ADVANCED PRACTICE MIDWIFE

## 2024-11-28 PROCEDURE — 25000003 PHARM REV CODE 250: Performed by: OBSTETRICS & GYNECOLOGY

## 2024-11-28 PROCEDURE — 3E0DXGC INTRODUCTION OF OTHER THERAPEUTIC SUBSTANCE INTO MOUTH AND PHARYNX, EXTERNAL APPROACH: ICD-10-PCS | Performed by: ADVANCED PRACTICE MIDWIFE

## 2024-11-28 PROCEDURE — 3E033VJ INTRODUCTION OF OTHER HORMONE INTO PERIPHERAL VEIN, PERCUTANEOUS APPROACH: ICD-10-PCS | Performed by: ADVANCED PRACTICE MIDWIFE

## 2024-11-28 PROCEDURE — 63600175 PHARM REV CODE 636 W HCPCS: Performed by: NURSE ANESTHETIST, CERTIFIED REGISTERED

## 2024-11-28 PROCEDURE — 72100003 HC LABOR CARE, EA. ADDL. 8 HRS

## 2024-11-28 PROCEDURE — 11000001 HC ACUTE MED/SURG PRIVATE ROOM

## 2024-11-28 PROCEDURE — 10907ZC DRAINAGE OF AMNIOTIC FLUID, THERAPEUTIC FROM PRODUCTS OF CONCEPTION, VIA NATURAL OR ARTIFICIAL OPENING: ICD-10-PCS | Performed by: ADVANCED PRACTICE MIDWIFE

## 2024-11-28 PROCEDURE — 72200006 HC VAGINAL DELIVERY LEVEL III

## 2024-11-28 PROCEDURE — 27200710 HC EPIDURAL INFUSION PUMP SET: Performed by: STUDENT IN AN ORGANIZED HEALTH CARE EDUCATION/TRAINING PROGRAM

## 2024-11-28 RX ORDER — ONDANSETRON 8 MG/1
8 TABLET, ORALLY DISINTEGRATING ORAL EVERY 8 HOURS PRN
Status: DISCONTINUED | OUTPATIENT
Start: 2024-11-28 | End: 2024-12-01 | Stop reason: HOSPADM

## 2024-11-28 RX ORDER — OXYTOCIN-SODIUM CHLORIDE 0.9% IV SOLN 30 UNIT/500ML 30-0.9/5 UT/ML-%
0-32 SOLUTION INTRAVENOUS CONTINUOUS
Status: DISCONTINUED | OUTPATIENT
Start: 2024-11-28 | End: 2024-11-28

## 2024-11-28 RX ORDER — METHYLERGONOVINE MALEATE 0.2 MG/ML
200 INJECTION INTRAVENOUS ONCE AS NEEDED
Status: DISCONTINUED | OUTPATIENT
Start: 2024-11-28 | End: 2024-12-01 | Stop reason: HOSPADM

## 2024-11-28 RX ORDER — MISOPROSTOL 200 UG/1
800 TABLET ORAL ONCE AS NEEDED
Status: DISCONTINUED | OUTPATIENT
Start: 2024-11-28 | End: 2024-12-01 | Stop reason: HOSPADM

## 2024-11-28 RX ORDER — MAGNESIUM SULFATE HEPTAHYDRATE 40 MG/ML
2 INJECTION, SOLUTION INTRAVENOUS CONTINUOUS
Status: DISCONTINUED | OUTPATIENT
Start: 2024-11-28 | End: 2024-11-29

## 2024-11-28 RX ORDER — SODIUM CHLORIDE 0.9 % (FLUSH) 0.9 %
10 SYRINGE (ML) INJECTION
Status: DISCONTINUED | OUTPATIENT
Start: 2024-11-28 | End: 2024-12-01 | Stop reason: HOSPADM

## 2024-11-28 RX ORDER — HYDROCODONE BITARTRATE AND ACETAMINOPHEN 5; 325 MG/1; MG/1
1 TABLET ORAL EVERY 4 HOURS PRN
Status: DISCONTINUED | OUTPATIENT
Start: 2024-11-28 | End: 2024-12-01 | Stop reason: HOSPADM

## 2024-11-28 RX ORDER — HYDROCODONE BITARTRATE AND ACETAMINOPHEN 7.5; 325 MG/1; MG/1
1 TABLET ORAL EVERY 4 HOURS PRN
Status: DISCONTINUED | OUTPATIENT
Start: 2024-11-28 | End: 2024-12-01 | Stop reason: HOSPADM

## 2024-11-28 RX ORDER — HYDROCORTISONE 25 MG/G
CREAM TOPICAL 3 TIMES DAILY PRN
Status: DISCONTINUED | OUTPATIENT
Start: 2024-11-28 | End: 2024-12-01 | Stop reason: HOSPADM

## 2024-11-28 RX ORDER — DIPHENHYDRAMINE HCL 25 MG
25 CAPSULE ORAL EVERY 4 HOURS PRN
Status: DISCONTINUED | OUTPATIENT
Start: 2024-11-28 | End: 2024-12-01 | Stop reason: HOSPADM

## 2024-11-28 RX ORDER — CARBOPROST TROMETHAMINE 250 UG/ML
250 INJECTION, SOLUTION INTRAMUSCULAR
Status: DISCONTINUED | OUTPATIENT
Start: 2024-11-28 | End: 2024-12-01 | Stop reason: HOSPADM

## 2024-11-28 RX ORDER — OXYTOCIN/0.9 % SODIUM CHLORIDE 15/250 ML
0-32 PLASTIC BAG, INJECTION (ML) INTRAVENOUS CONTINUOUS
Status: DISCONTINUED | OUTPATIENT
Start: 2024-11-28 | End: 2024-11-28

## 2024-11-28 RX ORDER — DIPHENOXYLATE HYDROCHLORIDE AND ATROPINE SULFATE 2.5; .025 MG/1; MG/1
2 TABLET ORAL EVERY 6 HOURS PRN
Status: DISCONTINUED | OUTPATIENT
Start: 2024-11-28 | End: 2024-12-01 | Stop reason: HOSPADM

## 2024-11-28 RX ORDER — DIPHENHYDRAMINE HYDROCHLORIDE 50 MG/ML
25 INJECTION INTRAMUSCULAR; INTRAVENOUS EVERY 4 HOURS PRN
Status: DISCONTINUED | OUTPATIENT
Start: 2024-11-28 | End: 2024-12-01 | Stop reason: HOSPADM

## 2024-11-28 RX ORDER — ACETAMINOPHEN 325 MG/1
650 TABLET ORAL
Status: DISCONTINUED | OUTPATIENT
Start: 2024-11-28 | End: 2024-12-01 | Stop reason: HOSPADM

## 2024-11-28 RX ORDER — CALCIUM GLUCONATE 98 MG/ML
1 INJECTION, SOLUTION INTRAVENOUS
Status: DISCONTINUED | OUTPATIENT
Start: 2024-11-28 | End: 2024-11-28 | Stop reason: SDUPTHER

## 2024-11-28 RX ORDER — ROPIVACAINE HYDROCHLORIDE 2 MG/ML
12 INJECTION, SOLUTION EPIDURAL; INFILTRATION CONTINUOUS
Status: DISCONTINUED | OUTPATIENT
Start: 2024-11-28 | End: 2024-11-28

## 2024-11-28 RX ORDER — DOCUSATE SODIUM 100 MG/1
200 CAPSULE, LIQUID FILLED ORAL 2 TIMES DAILY PRN
Status: DISCONTINUED | OUTPATIENT
Start: 2024-11-28 | End: 2024-12-01 | Stop reason: HOSPADM

## 2024-11-28 RX ORDER — SODIUM CITRATE AND CITRIC ACID MONOHYDRATE 334; 500 MG/5ML; MG/5ML
30 SOLUTION ORAL ONCE
Status: DISCONTINUED | OUTPATIENT
Start: 2024-11-28 | End: 2024-11-28

## 2024-11-28 RX ORDER — HYDRALAZINE HYDROCHLORIDE 20 MG/ML
10 INJECTION INTRAMUSCULAR; INTRAVENOUS ONCE AS NEEDED
Status: DISCONTINUED | OUTPATIENT
Start: 2024-11-28 | End: 2024-12-01 | Stop reason: HOSPADM

## 2024-11-28 RX ORDER — TRANEXAMIC ACID 10 MG/ML
1000 INJECTION, SOLUTION INTRAVENOUS EVERY 30 MIN PRN
Status: DISCONTINUED | OUTPATIENT
Start: 2024-11-28 | End: 2024-12-01 | Stop reason: HOSPADM

## 2024-11-28 RX ORDER — IBUPROFEN 600 MG/1
600 TABLET ORAL
Status: DISCONTINUED | OUTPATIENT
Start: 2024-11-28 | End: 2024-11-29

## 2024-11-28 RX ORDER — OXYTOCIN-SODIUM CHLORIDE 0.9% IV SOLN 30 UNIT/500ML 30-0.9/5 UT/ML-%
95 SOLUTION INTRAVENOUS ONCE AS NEEDED
Status: DISCONTINUED | OUTPATIENT
Start: 2024-11-28 | End: 2024-12-01 | Stop reason: HOSPADM

## 2024-11-28 RX ORDER — LABETALOL HYDROCHLORIDE 5 MG/ML
20 INJECTION, SOLUTION INTRAVENOUS ONCE AS NEEDED
Status: DISCONTINUED | OUTPATIENT
Start: 2024-11-28 | End: 2024-12-01 | Stop reason: HOSPADM

## 2024-11-28 RX ORDER — LABETALOL HYDROCHLORIDE 5 MG/ML
40 INJECTION, SOLUTION INTRAVENOUS ONCE AS NEEDED
Status: DISCONTINUED | OUTPATIENT
Start: 2024-11-28 | End: 2024-12-01 | Stop reason: HOSPADM

## 2024-11-28 RX ORDER — LABETALOL HYDROCHLORIDE 5 MG/ML
80 INJECTION, SOLUTION INTRAVENOUS ONCE AS NEEDED
Status: DISCONTINUED | OUTPATIENT
Start: 2024-11-28 | End: 2024-12-01 | Stop reason: HOSPADM

## 2024-11-28 RX ORDER — OXYTOCIN 10 [USP'U]/ML
10 INJECTION, SOLUTION INTRAMUSCULAR; INTRAVENOUS ONCE AS NEEDED
Status: DISCONTINUED | OUTPATIENT
Start: 2024-11-28 | End: 2024-12-01 | Stop reason: HOSPADM

## 2024-11-28 RX ORDER — SIMETHICONE 80 MG
1 TABLET,CHEWABLE ORAL EVERY 6 HOURS PRN
Status: DISCONTINUED | OUTPATIENT
Start: 2024-11-28 | End: 2024-12-01 | Stop reason: HOSPADM

## 2024-11-28 RX ORDER — FAMOTIDINE 10 MG/ML
20 INJECTION INTRAVENOUS ONCE
Status: DISCONTINUED | OUTPATIENT
Start: 2024-11-28 | End: 2024-11-28

## 2024-11-28 RX ORDER — OXYTOCIN-SODIUM CHLORIDE 0.9% IV SOLN 30 UNIT/500ML 30-0.9/5 UT/ML-%
95 SOLUTION INTRAVENOUS CONTINUOUS PRN
Status: DISCONTINUED | OUTPATIENT
Start: 2024-11-28 | End: 2024-12-01 | Stop reason: HOSPADM

## 2024-11-28 RX ORDER — PRENATAL WITH FERROUS FUM AND FOLIC ACID 3080; 920; 120; 400; 22; 1.84; 3; 20; 10; 1; 12; 200; 27; 25; 2 [IU]/1; [IU]/1; MG/1; [IU]/1; MG/1; MG/1; MG/1; MG/1; MG/1; MG/1; UG/1; MG/1; MG/1; MG/1; MG/1
1 TABLET ORAL DAILY
Status: DISCONTINUED | OUTPATIENT
Start: 2024-11-29 | End: 2024-12-01 | Stop reason: HOSPADM

## 2024-11-28 RX ORDER — OXYTOCIN-SODIUM CHLORIDE 0.9% IV SOLN 30 UNIT/500ML 30-0.9/5 UT/ML-%
10 SOLUTION INTRAVENOUS ONCE AS NEEDED
Status: DISCONTINUED | OUTPATIENT
Start: 2024-11-28 | End: 2024-12-01 | Stop reason: HOSPADM

## 2024-11-28 RX ORDER — LIDOCAINE HYDROCHLORIDE AND EPINEPHRINE 15; 5 MG/ML; UG/ML
INJECTION, SOLUTION EPIDURAL
Status: DISCONTINUED | OUTPATIENT
Start: 2024-11-28 | End: 2024-11-28

## 2024-11-28 RX ADMIN — SODIUM CHLORIDE, SODIUM LACTATE, POTASSIUM CHLORIDE, AND CALCIUM CHLORIDE 250 ML: 600; 310; 30; 20 INJECTION, SOLUTION INTRAVENOUS at 12:11

## 2024-11-28 RX ADMIN — ROPIVACAINE HYDROCHLORIDE 6 ML: 2 INJECTION, SOLUTION EPIDURAL; INFILTRATION at 08:11

## 2024-11-28 RX ADMIN — IBUPROFEN 600 MG: 600 TABLET, FILM COATED ORAL at 09:11

## 2024-11-28 RX ADMIN — SODIUM CHLORIDE, POTASSIUM CHLORIDE, SODIUM LACTATE AND CALCIUM CHLORIDE 1000 ML: 600; 310; 30; 20 INJECTION, SOLUTION INTRAVENOUS at 08:11

## 2024-11-28 RX ADMIN — SODIUM CHLORIDE, POTASSIUM CHLORIDE, SODIUM LACTATE AND CALCIUM CHLORIDE 500 ML: 600; 310; 30; 20 INJECTION, SOLUTION INTRAVENOUS at 12:11

## 2024-11-28 RX ADMIN — CETIRIZINE HYDROCHLORIDE 5 MG: 5 TABLET ORAL at 08:11

## 2024-11-28 RX ADMIN — MAGNESIUM SULFATE HEPTAHYDRATE 2 G/HR: 40 INJECTION, SOLUTION INTRAVENOUS at 03:11

## 2024-11-28 RX ADMIN — LIDOCAINE HYDROCHLORIDE,EPINEPHRINE BITARTRATE 3 ML: 15; .005 INJECTION, SOLUTION EPIDURAL; INFILTRATION; INTRACAUDAL; PERINEURAL at 08:11

## 2024-11-28 RX ADMIN — ACETAMINOPHEN 650 MG: 325 TABLET ORAL at 09:11

## 2024-11-28 RX ADMIN — ACETAMINOPHEN 650 MG: 325 TABLET ORAL at 04:11

## 2024-11-28 RX ADMIN — NIFEDIPINE 30 MG: 30 TABLET, FILM COATED, EXTENDED RELEASE ORAL at 09:11

## 2024-11-28 RX ADMIN — Medication 4 MILLI-UNITS/MIN: at 11:11

## 2024-11-28 RX ADMIN — OXYTOCIN-SODIUM CHLORIDE 0.9% IV SOLN 30 UNIT/500ML 10 UNITS: 30-0.9/5 SOLUTION at 02:11

## 2024-11-28 RX ADMIN — IBUPROFEN 600 MG: 600 TABLET, FILM COATED ORAL at 04:11

## 2024-11-28 NOTE — ASSESSMENT & PLAN NOTE
-Urine P/C ratio 0.53 on 11/13  - S/P magnesium sulfate x 24 hours.  - Blood pressures well controlled on procardia XL 30 mg BID  - BMZ series complete  - In the setting of preeclampsia with severe features and IUGR, will plan inpatient surveillance until delivery with weekly labs (q Monday), BPP q Monday/Thursday, and UA Dopplers q Monday.  Plan delivery at 34 weeks EGA or sooner as indicated for maternal or fetal indications.    HD #10  Denies preE symtpoms  Bp stable on procardia 30 bid  S/p magnesium sulfate; s/p bmz series  Continue nst bid  Bpp due on Thursday; u/a with dopplers on Monday  Repeat labs on Monday  Delivery at 34 wks or sooner for maternal or fetal indications    HD #11  33w3d  Denies preE symptoms  Bp stable on procardia 30mg bid  Continue nst bid  Bpp/dopplers labs (cbc, cmp) due in 2 days  Deliver at 34 wk; csection if remains breech    HD #12  33w4d  Denies preE symtpoms  Cotninue procardia 30 bid  Continue nst bid  Bpp /dopplers due tomorrow; cbc, cmp, t&s tomorrow  C/s at 34 w (if remains breech) or deliver sooner for maternal or fetal indications    11/25/24  No issues, BPP 8/8, normal dopplers, CBC, CMP stable, T&S obtained  Will continue to monitor fetal and maternal status for changes  Will increase Procardia if patient consistently close to severe range.  11/28/2024  HD #14  33w6d  Denies preE symptoms  Cotninue procardia 30 bid  Continue nst bid  Induction tomorrow  11/26/24:Doing well today. Denies any s/s of pre-e. Continue procardia. Bedside US performed, vertex presentation. Cervix FT/Thick/hard. Will plan to start IOL to start with cytotec. Dicussed POC with MD and patient. All agreeable. GBS not tested, so will do PPX treatment with Pcn.     11/28/24 CRB placed, start pitocin, GBS negative, BP stable, continue procardia, anticipate progress and NVD

## 2024-11-28 NOTE — PROGRESS NOTES
S: Resting without complaints  SO at bedside and supportive  O:  VS reviewed, afebrile   Vitals:    11/27/24 1730 11/27/24 1905 11/27/24 1915 11/27/24 1930   BP: 135/72 (!) 152/101     Pulse: (!) 115 (!) 120 (!) 119 (!) 131   Resp:  16     Temp:  98.4 °F (36.9 °C)     TempSrc:  Oral     SpO2: 98% 99% 98% 98%       FHTs:  CAT 1 reassuring with moderate variability  UC: + irritability  SVE 1,cervidil inserted    A: IUP @ 34w0d ;     Patient Active Problem List   Diagnosis    Severe pre-eclampsia in third trimester    Poor fetal growth affecting management of mother in third trimester    Tachycardia    Nasal sinus congestion       P:   Continue close monitoring of maternal/fetal status

## 2024-11-28 NOTE — PROGRESS NOTES
[facility] - Labor & Delivery  Obstetrics  Labor Progress Note    Patient Name: Maria De Jesus Payne  MRN: [id number]  Admission Date: 11/13/2024  Hospital Length of Stay: 10 days  Attending Physician: Jasson Pineda MD  Primary Care Provider: Abena, Primary Doctor    Subjective:     Principal Problem:Severe pre-eclampsia in third trimester    Hospital Course:  Pt admitted to OB service with preeclampsia.  Urine P/C ratio 0.53.  Labs wnl.  Ultrasound 11/13/24 consistent with IUGR (EFW 1480 g at the 10th%, AC 3rd%), UA doppler wnl  BMZ on 11/13 and 11/14 11/14/24: HD#2, asymptomatic.  BP wnl.  Pt to complete BMZ series today  11/15/24:  Blood pressures elevated to severe range.  Magnesium sulfate given.  11/16/24: Blood pressures normal to mild range today. Episode of shortness of breath, fatigue, diminished reflexes, mag stopped, Mag level 5.8, restarted magnesium at 1g/hr  11/17/24 - Procardia XL 30 mg BID initiated for blood pressure control.  Blood pressures stable.  Patient asymptomatic.  11/18/24: BP stable on current regimen. JAD 12.8. MVP 4.9 cm. Breech presentation. No evidence of previa. Anterior placenta. Biophysical profile score 8/8 11/19/24--tacchycardia, shortness of breath and chest pain, neg CXR, elevated d dimer, neg resp panel, neg CTA-p; small pleural effusion; chest pain improved with ativan;   11/21/24--BPP 8/8, breech, umbilical artery dopplers wnl  11/23/24--denies preE symtpoms, bp stable on procardia 30mg bid  11/24/24-denies preE stympoms, bp elevated on procardia 30bid, but no severe range pressures  11/25/24- Denies preE symptoms, continue procardia 30BID, BPP 8/8, vertex, dopplers normal, pre-eclampsia labs stable, no severe range blood pressures.;   11/26/24:Doing well today. Denies any s/s of pre-e. Continue procardia. Bedside US performed, vertex presentation. Cervix FT/Thick/hard. Will plan to start IOL to start with cytotec. Dicussed POC with MD and patient. All agreeable. GBS not  tested, so will do PPX treatment with Pcn.   24 Continue with Cytotec PO Q 4 hr  24 0745-s/p cervidil overnight, VE /-2 CRB placed, start pitocin, hydrate for epidural, BP stable    Interval History:  Maria De Jesus is a 19 y.o.  at 34w1d. She is doing well. Reports some mild back cramping    Objective:     Vital Signs (Most Recent):  Temp: 98.1 °F (36.7 °C) (24)  Pulse: (!) 119 (24)  Resp: 16 (24)  BP: 127/80 (24)  SpO2: 98 % (24) Vital Signs (24h Range):  Temp:  [98.1 °F (36.7 °C)-99.1 °F (37.3 °C)] 98.1 °F (36.7 °C)  Pulse:  [] 119  Resp:  [16-18] 16  SpO2:  [84 %-100 %] 98 %  BP: (122-160)/() 127/80        There is no height or weight on file to calculate BMI.    FHT: 150 Cat 1 (reassuring)  TOCO:  Q 4-5 minutes    Cervical Exam:  Dilation:  1  Effacement:  70  Station: -2  Presentation: Vertex     Significant Labs:  Lab Results   Component Value Date    GROUPTRH O POS 2024    HEPBSAG Non-reactive 2024       I have personallly reviewed all pertinent lab results from the last 24 hours.  Recent Lab Results       None            Physical Exam:   Constitutional: She is oriented to person, place, and time. Vital signs are normal. She appears well-developed and well-nourished. She is cooperative.    HENT:   Head: Normocephalic.      Cardiovascular:  Normal rate.      Exam reveals no edema.        Pulmonary/Chest: Effort normal.        Abdominal: Soft.   Gravid, non-tender     Genitourinary:    Vagina and uterus normal.             Musculoskeletal: Normal range of motion and moves all extremeties.       Neurological: She is alert and oriented to person, place, and time. She has normal strength and normal reflexes.    Skin: Skin is warm and dry. Capillary refill takes less than 2 seconds.    Psychiatric: She has a normal mood and affect. Her speech is normal and behavior is normal. Judgment and thought content normal.        Review of Systems  Assessment/Plan:     19 y.o. female  at 34w1d for:    * Severe pre-eclampsia in third trimester  -Urine P/C ratio 0.53 on   - S/P magnesium sulfate x 24 hours.  - Blood pressures well controlled on procardia XL 30 mg BID  - BMZ series complete  - In the setting of preeclampsia with severe features and IUGR, will plan inpatient surveillance until delivery with weekly labs (q Monday), BPP q Monday/Thursday, and UA Dopplers q Monday.  Plan delivery at 34 weeks EGA or sooner as indicated for maternal or fetal indications.    HD #10  Denies preE symtpoms  Bp stable on procardia 30 bid  S/p magnesium sulfate; s/p bmz series  Continue nst bid  Bpp due on Thursday; u/a with dopplers on Monday  Repeat labs on Monday  Delivery at 34 wks or sooner for maternal or fetal indications    HD #11  33w3d  Denies preE symptoms  Bp stable on procardia 30mg bid  Continue nst bid  Bpp/dopplers labs (cbc, cmp) due in 2 days  Deliver at 34 wk; csection if remains breech    HD #12  33w4d  Denies preE symtpoms  Cotninue procardia 30 bid  Continue nst bid  Bpp /dopplers due tomorrow; cbc, cmp, t&s tomorrow  C/s at 34 w (if remains breech) or deliver sooner for maternal or fetal indications    24  No issues, BPP 8/8, normal dopplers, CBC, CMP stable, T&S obtained  Will continue to monitor fetal and maternal status for changes  Will increase Procardia if patient consistently close to severe range.  2024  HD #14  33w6d  Denies preE symptoms  Cotninue procardia 30 bid  Continue nst bid  Induction tomorrow  24:Doing well today. Denies any s/s of pre-e. Continue procardia. Bedside US performed, vertex presentation. Cervix FT/Thick/hard. Will plan to start IOL to start with cytotec. Dicussed POC with MD and patient. All agreeable. GBS not tested, so will do PPX treatment with Pcn.     24 CRB placed, start pitocin, GBS negative, BP stable, continue procardia, anticipate progress and  NVD      Nasal sinus congestion  11/23/2024  Add zyrtec and flonase  11/23/2024  Feels better  Continue zyrtec/flonase    Tachycardia  11/22/2024  Negative EKG, chest xray, cta    Poor fetal growth affecting management of mother in third trimester  US on 11/13 - 1480 g (10%), AC 3%, BPP 8/8, MVP 7.6, S:D nL, cephalic                Yulissa Pina, JONO  Obstetrics  [facility] - [facility]

## 2024-11-28 NOTE — PROGRESS NOTES
S: Resting well  O:  VS reviewed, afebrile   Vitals:    11/27/24 2315 11/27/24 2330 11/27/24 2345 11/28/24 0000   BP:    136/82   Pulse: 101 102 106 101   Resp:    16   Temp:    98.3 °F (36.8 °C)   TempSrc:    Oral   SpO2: 97% 97% 97% 97%       FHTs:  CAT 2 reassuring, periods of moderate variability with intermittent decelerations  UC:  Q 2-4  SVE: deferred    A: IUP @ 34w1d ;     Patient Active Problem List   Diagnosis    Severe pre-eclampsia in third trimester    Poor fetal growth affecting management of mother in third trimester    Tachycardia    Nasal sinus congestion       P: continue with Cervidil  Continue close monitoring of maternal/fetal status    negative...

## 2024-11-28 NOTE — PROGRESS NOTES
S: comfortable with epidural  O:  VS reviewed, afebrile   Vitals:    11/28/24 1200 11/28/24 1215 11/28/24 1230 11/28/24 1245   BP: 133/87 135/87 (!) 128/92 (!) 134/93   Pulse: 100 107 (!) 113 110   Resp:       Temp:       TempSrc:       SpO2: 100% 99% 99% 100%       FHTs Cat II reassuring, intermittent late decelerations, moderate variability, Cat I at this time  UC q2-3 mins  SVE 5/80/-2 AROM small amount of clear fluid    A: IUP @ 34w1d ;     Patient Active Problem List   Diagnosis    Severe pre-eclampsia in third trimester    Poor fetal growth affecting management of mother in third trimester    Tachycardia    Nasal sinus congestion       P:   Pitocin decreased to 4mu/min and FHT now cat I, continue with pitocin IOL  BP stable  Continue close monitoring of maternal/fetal status  Anticipate progress and NVD

## 2024-11-28 NOTE — ANESTHESIA PREPROCEDURE EVALUATION
2024  Maria De Jesus Payne is a 19 y.o., female.      Pre-op Assessment    I have reviewed the Patient Summary Reports.     I have reviewed the Nursing Notes.    I have reviewed the Medications.     Review of Systems  Anesthesia Hx:   Neg history of prior surgery.          Denies Family Hx of Anesthesia complications.    Denies Personal Hx of Anesthesia complications.                    Cardiovascular:     Hypertension                                          OB/GYN/PEDS:    Planned Vaginal Delivery                         Physical Exam  General: Well nourished, Cooperative and Alert    Airway:  Mallampati: II   Mouth Opening: Normal  TM Distance: Normal  Tongue: Normal  Neck ROM: Normal ROM    Dental:  Intact    Chest/Lungs:  Clear to auscultation, Normal Respiratory Rate    Heart:  Rate: Normal  Rhythm: Regular Rhythm  Sounds: Normal      Lab Results   Component Value Date    WBC 10.19 2024    HGB 12.6 2024    HCT 38.7 2024    MCV 82 2024     2024       Patient Active Problem List   Diagnosis    Severe pre-eclampsia in third trimester    Poor fetal growth affecting management of mother in third trimester    Tachycardia    Nasal sinus congestion     OB History          1    Para   0    Term   0       0    AB   0    Living   0         SAB   0    IAB   0    Ectopic   0    Multiple   0    Live Births   0                   Anesthesia Plan  Type of Anesthesia, risks & benefits discussed:    Anesthesia Type: Epidural  Intra-op Monitoring Plan: Standard ASA Monitors  Post Op Pain Control Plan: epidural analgesia  Informed Consent: Informed consent signed with the Patient and all parties understand the risks and agree with anesthesia plan.  All questions answered.   ASA Score: 2  Day of Surgery Review of History & Physical: H&P Update referred to the  surgeon/provider.    Ready For Surgery From Anesthesia Perspective.     .

## 2024-11-28 NOTE — ANESTHESIA POSTPROCEDURE EVALUATION
Anesthesia Post Evaluation    Patient: Maria De Jesus Payne    Procedure(s) Performed: * No procedures listed *    Final Anesthesia Type: epidural      Patient location during evaluation: labor & delivery  Patient participation: Yes- Able to Participate  Level of consciousness: awake and alert, awake and oriented  Post-procedure vital signs: reviewed and stable  Pain management: adequate  Airway patency: patent    PONV status at discharge: No PONV  Anesthetic complications: no      Cardiovascular status: blood pressure returned to baseline and hemodynamically stable  Respiratory status: unassisted and spontaneous ventilation  Hydration status: euvolemic  Follow-up not needed.              Vitals Value Taken Time   /86 11/28/24 1604   Temp 37.2 °C (98.9 °F) 11/28/24 1330   Pulse 113 11/28/24 1604   Resp 16 11/28/24 1005   SpO2 100 % 11/28/24 1558   Vitals shown include unfiled device data.      No case tracking events are documented in the log.      Pain/Carlos Score: No data recorded

## 2024-11-28 NOTE — LACTATION NOTE
"Lactation to bedside. Mother states that she wishes to establish a full milk supply for infant to be exclusively breast fed.     Briefly reviewed mechanism of milk production and maintenance. Discussed the need to pump a minimum of 8 times each 24 hours to help ensure desired milk supply. Encouraged initiation of pumping within 3 hours of life and at least by 6 hours of life, unless mother medically unstable, "The sooner, the better."  Since mother has food tray in front of her, offered mother to eat prior to pumping and she confirms that she does wish to eat first.     Instructed mother to request assistance with initiation of pumping when she has completed her meal, she verbalizes understanding. Breast pump, pump kit & written educational materials left at bedside so they remain readily available.    "

## 2024-11-28 NOTE — NURSING
Rule. Symphony breast pump set up at bedside.  Instructed on proper usage and to adjust suction according to comfort level. Verified appropriate flange fit. Reviewed frequency and duration of pumping in order to promote and maintain full milk supply. Hands-on pumping technique reviewed. Encouraged hand expression after. Instructed on proper cleaning of breast pump parts. Reviewed proper milk handling, collection, storage, and transportation. Voices understanding.

## 2024-11-28 NOTE — L&D DELIVERY NOTE
"[facility] - Labor & Delivery  Vaginal Delivery   Operative Note    SUMMARY     Normal spontaneous vaginal delivery of live infant, skin to skin was unable to be performed due to prematurity. Infant crying and vigorous so delayed cord clamping performed for 45 seconds then cord clamped and cut by family and given to NICU team .  Infant delivered position OP over intact perineum.  Nuchal cord: Yes, cord reduced at perineum.    Spontaneous delivery of placenta and IV pitocin given noting good uterine tone.  No lacerations noted.  Patient tolerated delivery well. Sponge needle and lap counted correctly x2.  Placenta appears intact  Placenta sent to pathology due to severe pre-eclampsia and FGR    Apgars 8/9    Indications: Severe pre-eclampsia in third trimester  Pregnancy complicated by:   Patient Active Problem List   Diagnosis    Severe pre-eclampsia in third trimester    Poor fetal growth affecting management of mother in third trimester    Tachycardia    Nasal sinus congestion     delivery (maternal condition)     (normal spontaneous vaginal delivery)     Admitting GA: 34w1d    Delivery Information for Viktor Payne    Birth information:  YOB: 2026   Time of birth: 2:46 PM   Sex: male   Head Delivery Date/Time: 2024  2:46 PM   Delivery type: Vaginal, Spontaneous   Gestational Age: 34w1d       Delivery Providers    Delivering clinician: Yulissa Pina CNM   Provider Role    Candy Batista, RN Registered Nurse    Tabby Casey, [facility]    Mae Dietrich RN Registered Nurse    Seth Mckeon, RRT Respiratory Therapist    Shila Amaya, Patient Care Assistant Respiratory Therapist    George Bravo, RN Registered Nurse    Evelyn Marley, NNP Nurse Practitioner              Measurements    Weight: 1840 g  Weight (lbs): 4 lb 0.9 oz  Length: 44 cm  Length (in): 17.32"  Head circumference: 30.5 cm  Chest circumference: 28 cm  Abdominal girth: 24 cm   "       Apgars    Living status: Living  Apgar Component Scores:  1 min.:  5 min.:  10 min.:  15 min.:  20 min.:    Skin color:  0  1       Heart rate:  2  2       Reflex irritability:  2  2       Muscle tone:  2  2       Respiratory effort:  2  2       Total:  8  9       Apgars assigned by: HAMMAD KAUFMAN         Operative Delivery    Forceps attempted?: No  Vacuum extractor attempted?: No         Shoulder Dystocia    Shoulder dystocia present?: No           Presentation    Presentation: Vertex  Position: Middle Occiput Posterior           Interventions/Resuscitation    Method: Bulb Suctioning, Tactile Stimulation, NICU Attended, Deep Suctioning       Cord    Vessels: 3 vessels  Complications: Nuchal  Nuchal Intervention: reduced  Nuchal Cord Description: loose nuchal cord  Number of Loops: 1  Delayed Cord Clamping?: Yes  Cord Clamped Date/Time: 2024  2:47 PM  Cord Blood Disposition: Lab  Gases Sent?: No  Stem Cell Collection (by MD): No       Placenta    Placenta delivery date/time: 2024 1457  Placenta removal: Spontaneous  Placenta appearance: Intact  Placenta disposition: Discarded           Labor Events:       labor: Yes     Labor Onset Date/Time: 2024 19:00     Dilation Complete Date/Time: 2024 14:35     Start Pushing Date/Time: 2024 14:38       Start Pushing Date/Time: 2024 14:38     Rupture Date/Time: 24 1313        Rupture type: ARM (Artificial Rupture)        Fluid Amount:       Fluid Color: Clear              steroids: Full Course     Antibiotics given for GBS: No     Induction: amniotomy;balloon dilation ([place]);dinoprostone insert;misoprostol;oxytocin     Indications for induction:  Severe Preeclampsia     Augmentation:       Indications for augmentation:       Labor complications: None     Additional complications:          Cervical ripening:          Misoprostol;Cervidil          Delivery:      Episiotomy: None     Indication for Episiotomy:        Perineal Lacerations: None Repaired:      Periurethral Laceration:   Repaired:     Labial Laceration:   Repaired:     Sulcus Laceration:   Repaired:     Vaginal Laceration:   Repaired:     Cervical Laceration:   Repaired:     Repair suture: None     Repair # of packets:       Last Value - EBL - Nursing (mL):       Sum - EBL - Nursing (mL): 0     Last Value - EBL - Anesthesia (mL):      Calculated QBL (mL): 150     Running total QBL (mL): 150     Vaginal Sweep Performed: No     Surgicount Correct: Yes     Vaginal Packing: No Quantity:       Other providers:       Anesthesia    Method: Epidural          Details (if applicable):  Trial of Labor      Categorization:      Priority:     Indications for :     Incision Type:       Additional  information:  Forceps:    Vacuum:    Breech:    Observed anomalies    Other (Comments):

## 2024-11-28 NOTE — PROGRESS NOTES
S: continue to have intense vaginal pressure with contractions  O:  VS reviewed, afebrile   Vitals:    11/28/24 1245 11/28/24 1300 11/28/24 1315 11/28/24 1330   BP: (!) 134/93 (!) 142/92 (!) 146/103 (!) 142/101   Pulse: 110 110 (!) 120    Resp:       Temp:    98.9 °F (37.2 °C)   TempSrc:       SpO2: 100% 100% 100%        FHTs 155 Cat II with intermittent variable and late decelerations, minimal/moderate variability, no accels  UC q2-5 mins  SVE 7/90/0   Pitocin off    A: IUP @ 34w1d ;     Patient Active Problem List   Diagnosis    Severe pre-eclampsia in third trimester    Poor fetal growth affecting management of mother in third trimester    Tachycardia    Nasal sinus congestion       P:   Continue close monitoring of maternal/fetal status  Anticipate progress and NVD

## 2024-11-28 NOTE — SUBJECTIVE & OBJECTIVE
Interval History:  Maria De Jesus is a 19 y.o.  at 34w1d. She is doing well. Reports some mild back cramping    Objective:     Vital Signs (Most Recent):  Temp: 98.1 °F (36.7 °C) (24)  Pulse: (!) 119 (24)  Resp: 16 (24)  BP: 127/80 (24)  SpO2: 98 % (24) Vital Signs (24h Range):  Temp:  [98.1 °F (36.7 °C)-99.1 °F (37.3 °C)] 98.1 °F (36.7 °C)  Pulse:  [] 119  Resp:  [16-18] 16  SpO2:  [84 %-100 %] 98 %  BP: (122-160)/() 127/80        There is no height or weight on file to calculate BMI.    FHT: 150 Cat 1 (reassuring)  TOCO:  Q 4-5 minutes    Cervical Exam:  Dilation:  1  Effacement:  70  Station: -2  Presentation: Vertex     Significant Labs:  Lab Results   Component Value Date    GROUPTRH O POS 2024    HEPBSAG Non-reactive 2024       I have personallly reviewed all pertinent lab results from the last 24 hours.  Recent Lab Results       None            Physical Exam:   Constitutional: She is oriented to person, place, and time. Vital signs are normal. She appears well-developed and well-nourished. She is cooperative.    HENT:   Head: Normocephalic.      Cardiovascular:  Normal rate.      Exam reveals no edema.        Pulmonary/Chest: Effort normal.        Abdominal: Soft.   Gravid, non-tender     Genitourinary:    Vagina and uterus normal.             Musculoskeletal: Normal range of motion and moves all extremeties.       Neurological: She is alert and oriented to person, place, and time. She has normal strength and normal reflexes.    Skin: Skin is warm and dry. Capillary refill takes less than 2 seconds.    Psychiatric: She has a normal mood and affect. Her speech is normal and behavior is normal. Judgment and thought content normal.       Review of Systems

## 2024-11-28 NOTE — ANESTHESIA PROCEDURE NOTES
Epidural    Patient location during procedure: OB   Reason for block: primary anesthetic   Reason for block: labor analgesia requested by patient and obstetrician  Diagnosis: IUP   Start time: 11/28/2024 8:19 AM  Timeout: 11/28/2024 8:19 AM  End time: 11/28/2024 8:28 AM  Surgery related to: Planned vaginal delivery    Staffing  Performing Provider: George Mon CRNA  Authorizing Provider: Joseph Chavez MD    Staffing  Performed by: George Mon CRNA  Authorized by: Joseph Chavez MD        Preanesthetic Checklist  Completed: patient identified, IV checked, site marked, risks and benefits discussed, surgical consent, monitors and equipment checked, pre-op evaluation, timeout performed, anesthesia consent given, hand hygiene performed and patient being monitored  Preparation  Patient position: sitting  Prep: ChloraPrep  Patient monitoring: Pulse Ox and Blood Pressure  Reason for block: primary anesthetic   Epidural  Skin Anesthetic: lidocaine 1%  Skin Wheal: 3 mL  Administration type: continuous  Approach: midline  Interspace: L3-4    Injection technique: JANE air  Needle and Epidural Catheter  Needle type: Tuohy   Needle gauge: 17  Needle length: 3.5 inches  Needle insertion depth: 7 cm  Catheter type: springwound and multi-orifice  Catheter size: 18 G  Catheter at skin depth: 14 cm  Insertion Attempts: 1  Test dose: 3 mL of lidocaine 1.5% with Epi 1-to-200,000  Additional Documentation: incremental injection, negative aspiration for heme and CSF, no paresthesia on injection, no signs/symptoms of IV or SA injection, no significant pain on injection and no significant complaints from patient  Needle localization: anatomical landmarks  Medications:  Volume per aspiration: 0 mL  Time between aspirations: 2 minutes   Assessment  Upper dermatomal levels - Left: T10  Right: T10   Dermatomal levels determined by pinch or prick  Ease of block: easy  Patient's tolerance of the procedure:  comfortable throughout block and no complaints No inadvertent dural puncture with Tuohy.  Dural puncture not performed with spinal needle

## 2024-11-29 PROCEDURE — 99231 SBSQ HOSP IP/OBS SF/LOW 25: CPT | Mod: ,,,

## 2024-11-29 PROCEDURE — 25000003 PHARM REV CODE 250: Performed by: OBSTETRICS & GYNECOLOGY

## 2024-11-29 PROCEDURE — 25000003 PHARM REV CODE 250: Performed by: ADVANCED PRACTICE MIDWIFE

## 2024-11-29 PROCEDURE — 11000001 HC ACUTE MED/SURG PRIVATE ROOM

## 2024-11-29 RX ORDER — NIFEDIPINE 30 MG/1
60 TABLET, EXTENDED RELEASE ORAL 2 TIMES DAILY
Status: DISCONTINUED | OUTPATIENT
Start: 2024-11-29 | End: 2024-12-01 | Stop reason: HOSPADM

## 2024-11-29 RX ORDER — IBUPROFEN 800 MG/1
800 TABLET ORAL
Status: DISCONTINUED | OUTPATIENT
Start: 2024-11-29 | End: 2024-12-01 | Stop reason: HOSPADM

## 2024-11-29 RX ADMIN — NIFEDIPINE 60 MG: 30 TABLET, FILM COATED, EXTENDED RELEASE ORAL at 08:11

## 2024-11-29 RX ADMIN — ACETAMINOPHEN 650 MG: 325 TABLET ORAL at 03:11

## 2024-11-29 RX ADMIN — ACETAMINOPHEN 650 MG: 325 TABLET ORAL at 05:11

## 2024-11-29 RX ADMIN — IBUPROFEN 600 MG: 600 TABLET, FILM COATED ORAL at 03:11

## 2024-11-29 RX ADMIN — ACETAMINOPHEN 650 MG: 325 TABLET ORAL at 11:11

## 2024-11-29 RX ADMIN — IBUPROFEN 800 MG: 800 TABLET, FILM COATED ORAL at 11:11

## 2024-11-29 RX ADMIN — IBUPROFEN 800 MG: 800 TABLET, FILM COATED ORAL at 05:11

## 2024-11-29 NOTE — LACTATION NOTE
Lactation Rounds: Mother has elevated blood pressures at this time. Mother reports that she did use breast pump once and noted a glisten to her nipples but not enough to collect. Encouragement and understanding given to mother. Informed mother of lactation availability over night. Told mother to call for help as desired.

## 2024-11-29 NOTE — PROGRESS NOTES
Discussed practices that support optimal maternity care and  feeding such as immediate skin to skin, the magic first hour, the importance of the first feeding, and delaying routine procedures. Also discussed continued skin to skin contact, rooming-in, and feeding on cue. Discussed feeding choice with mother. Reviewed benefits of breastfeeding and risks of formula feeding. Mother states her intention is to breastfeed. Discussed importance of pumping/stimulating breasts every 3-4 hours while infant is in NICU to encourage milk production. Patient verbalized understanding.

## 2024-11-29 NOTE — ASSESSMENT & PLAN NOTE
11/29/24 Bps mildly elevated s/p 12 hours Mg+. Procardia dose increased 60 BID. Pt reports mild HA with significant facial and extremity swelling , will continue to monitor

## 2024-11-29 NOTE — PROGRESS NOTES
[facility] - Labor & Delivery  Obstetrics  Postpartum Progress Note    Patient Name: Maria De Jesus Payne  MRN: [id number]  Admission Date: 11/13/2024  Hospital Length of Stay: 11 days  Attending Physician: Jasson Pineda MD  Primary Care Provider: Abena, Primary Doctor    Subjective:     Principal Problem:Severe pre-eclampsia in third trimester    Hospital Course:  Pt admitted to OB service with preeclampsia.  Urine P/C ratio 0.53.  Labs wnl.  Ultrasound 11/13/24 consistent with IUGR (EFW 1480 g at the 10th%, AC 3rd%), UA doppler wnl  BMZ on 11/13 and 11/14 11/14/24: HD#2, asymptomatic.  BP wnl.  Pt to complete BMZ series today  11/15/24:  Blood pressures elevated to severe range.  Magnesium sulfate given.  11/16/24: Blood pressures normal to mild range today. Episode of shortness of breath, fatigue, diminished reflexes, mag stopped, Mag level 5.8, restarted magnesium at 1g/hr  11/17/24 - Procardia XL 30 mg BID initiated for blood pressure control.  Blood pressures stable.  Patient asymptomatic.  11/18/24: BP stable on current regimen. JAD 12.8. MVP 4.9 cm. Breech presentation. No evidence of previa. Anterior placenta. Biophysical profile score 8/8 11/19/24--tacchycardia, shortness of breath and chest pain, neg CXR, elevated d dimer, neg resp panel, neg CTA-p; small pleural effusion; chest pain improved with ativan;   11/21/24--BPP 8/8, breech, umbilical artery dopplers wnl  11/23/24--denies preE symtpoms, bp stable on procardia 30mg bid  11/24/24-denies preE stympoms, bp elevated on procardia 30bid, but no severe range pressures  11/25/24- Denies preE symptoms, continue procardia 30BID, BPP 8/8, vertex, dopplers normal, pre-eclampsia labs stable, no severe range blood pressures.;   11/26/24:Doing well today. Denies any s/s of pre-e. Continue procardia. Bedside US performed, vertex presentation. Cervix FT/Thick/hard. Will plan to start IOL to start with cytotec. Dicussed POC with MD and patient. All agreeable. GBS  "not tested, so will do PPX treatment with Pcn.   11/27/24 Continue with Cytotec PO Q 4 hr  11/28/24 0745-s/p cervidil overnight, VE 1/70/-2 CRB placed, start pitocin, hydrate for epidural, BP stable  11/29/24 PPD 1: routine PP care. Bps mildly elevated s/p 12 hours Mg+. Procardia dose increased 60 BID. Pt reports mild HA with significant facial and extremity swelling , will continue to monitor    Interval History: PPD1    She is doing well this morning. She is tolerating a regular diet without nausea or vomiting. She is voiding spontaneously. She is ambulating. She has not passed flatus, and has not a BM. Vaginal bleeding is moderate. She denies fever or chills. Abdominal pain is moderate and controlled with oral medications. She Is pumping. She desires circumcision for her male baby: yes.    Objective:     Vital Signs (Most Recent):  Temp: 97.7 °F (36.5 °C) (11/29/24 0804)  Pulse: (!) 0 (11/29/24 0928)  Resp: 18 (11/29/24 0804)  BP: 129/84 (11/29/24 0904)  SpO2: (!) 92 % (11/29/24 0928) Vital Signs (24h Range):  Temp:  [97.6 °F (36.4 °C)-98.9 °F (37.2 °C)] 97.7 °F (36.5 °C)  Pulse:  [0-209] 0  Resp:  [16-18] 18  SpO2:  [85 %-100 %] 92 %  BP: (100-164)/() 129/84        There is no height or weight on file to calculate BMI.      Intake/Output Summary (Last 24 hours) at 11/29/2024 0933  Last data filed at 11/29/2024 0345  Gross per 24 hour   Intake 2171.87 ml   Output 5775 ml   Net -3603.13 ml         Significant Labs:  Lab Results   Component Value Date    GROUPTRH O POS 11/25/2024    HEPBSAG Non-reactive 05/06/2024     No results for input(s): "HGB", "HCT" in the last 48 hours.    I have personallly reviewed all pertinent lab results from the last 24 hours.  Recent Lab Results       None            Physical Exam:   Constitutional: She is oriented to person, place, and time. She appears well-developed and well-nourished.    HENT:   Head: Normocephalic and atraumatic. Head is with right periorbital erythema and " with left periorbital erythema.    Eyes: EOM are normal.     Cardiovascular:  Normal rate.             Pulmonary/Chest: Effort normal.        Abdominal: Soft.     Genitourinary: There is vaginal discharge in the vagina.           Musculoskeletal: Normal range of motion and moves all extremeties. Edema present.       Neurological: She is alert and oriented to person, place, and time. She has normal reflexes.    Skin: Skin is warm and dry.    Psychiatric: She has a normal mood and affect. Her behavior is normal. Judgment and thought content normal.       Review of Systems   Constitutional:  Positive for fatigue.   Eyes:  Negative for visual disturbance.   Respiratory:  Negative for shortness of breath.    Cardiovascular:  Positive for leg swelling. Negative for chest pain.   Gastrointestinal:  Positive for abdominal pain.   Genitourinary:  Positive for vaginal bleeding.   Neurological:  Positive for headaches.   All other systems reviewed and are negative.    Assessment/Plan:     19 y.o. female  for:    * Severe pre-eclampsia in third trimester  24 Bps mildly elevated s/p 12 hours Mg+. Procardia dose increased 60 BID. Pt reports mild HA with significant facial and extremity swelling , will continue to monitor     (normal spontaneous vaginal delivery)  Routine PP care     delivery (maternal condition)  Care of infant under NICU team    Nasal sinus congestion  2024  Add zyrtec and flonase  2024  Feels better  Continue zyrtec/flonase    Tachycardia  2024  Negative EKG, chest xray, cta    Poor fetal growth affecting management of mother in third trimester  US on  - 1480 g (10%), AC 3%, BPP 8/8, MVP 7.6, S:D nL, cephalic            Disposition: As patient meets milestones, will plan to discharge possibly tomorrow pending BPs.    Kia Perez CNM  Obstetrics  [facility] - Labor & Delivery

## 2024-11-29 NOTE — PLAN OF CARE
Patient afebrile and had no falls this shift. Fundus firm without massage and below umbilicus. Bleeding light, no clots passed this shift. Voids spontaneously. Ambulates independently. Pain well controlled with oral pain medication. Blood pressure monitoring. Vital signs stable at this time. Baby in the NICU. Will continue to monitor.

## 2024-11-29 NOTE — LACTATION NOTE
"Lactation Rounds:    Lactation Rounds: Mother reports that she has only pumped her breasts once since delivery because she has been feeling bad while on the "magnesium IV." Mother reports getting drops during that pumping session. Infant currently in NICU. Reviewed proper usage of the breast pump and to adjust suction according to comfort level. Reinforced normalcy of seeing only drops with pumping the first two days of life. Mother states flanges fit well and she has no concerns at this with flange fit or comfort concerns when pumping. Reviewed with mother frequency and duration of pumping in order to promote and maintain full milk supply. Mother states she is confident in using the initiate program on the pump at her beside. Hands on pumping technique reviewed. Reviewed with mother mother the cleaning of breast pump parts. Reviewed proper milk handling, collection, storage, and transportation. Voices understanding.     Mother verbalizes understanding of all education and counseling. Mother denies any further lactation needs or concerns at this time. Opportunity for questions given. Discussed lactation availability. Encouraged mother to call for assistance when needs arise.   "

## 2024-11-29 NOTE — SUBJECTIVE & OBJECTIVE
"Interval History: PPD1    She is doing well this morning. She is tolerating a regular diet without nausea or vomiting. She is voiding spontaneously. She is ambulating. She has not passed flatus, and has not a BM. Vaginal bleeding is moderate. She denies fever or chills. Abdominal pain is moderate and controlled with oral medications. She Is pumping. She desires circumcision for her male baby: yes.    Objective:     Vital Signs (Most Recent):  Temp: 97.7 °F (36.5 °C) (11/29/24 0804)  Pulse: (!) 0 (11/29/24 0928)  Resp: 18 (11/29/24 0804)  BP: 129/84 (11/29/24 0904)  SpO2: (!) 92 % (11/29/24 0928) Vital Signs (24h Range):  Temp:  [97.6 °F (36.4 °C)-98.9 °F (37.2 °C)] 97.7 °F (36.5 °C)  Pulse:  [0-209] 0  Resp:  [16-18] 18  SpO2:  [85 %-100 %] 92 %  BP: (100-164)/() 129/84        There is no height or weight on file to calculate BMI.      Intake/Output Summary (Last 24 hours) at 11/29/2024 0933  Last data filed at 11/29/2024 0345  Gross per 24 hour   Intake 2171.87 ml   Output 5775 ml   Net -3603.13 ml         Significant Labs:  Lab Results   Component Value Date    GROUPTRH O POS 11/25/2024    HEPBSAG Non-reactive 05/06/2024     No results for input(s): "HGB", "HCT" in the last 48 hours.    I have personallly reviewed all pertinent lab results from the last 24 hours.  Recent Lab Results       None            Physical Exam:   Constitutional: She is oriented to person, place, and time. She appears well-developed and well-nourished.    HENT:   Head: Normocephalic and atraumatic. Head is with right periorbital erythema and with left periorbital erythema.    Eyes: EOM are normal.     Cardiovascular:  Normal rate.             Pulmonary/Chest: Effort normal.        Abdominal: Soft.     Genitourinary: There is vaginal discharge in the vagina.           Musculoskeletal: Normal range of motion and moves all extremeties. Edema present.       Neurological: She is alert and oriented to person, place, and time. She has normal " reflexes.    Skin: Skin is warm and dry.    Psychiatric: She has a normal mood and affect. Her behavior is normal. Judgment and thought content normal.       Review of Systems   Constitutional:  Positive for fatigue.   Eyes:  Negative for visual disturbance.   Respiratory:  Negative for shortness of breath.    Cardiovascular:  Positive for leg swelling. Negative for chest pain.   Gastrointestinal:  Positive for abdominal pain.   Genitourinary:  Positive for vaginal bleeding.   Neurological:  Positive for headaches.   All other systems reviewed and are negative.

## 2024-11-30 PROCEDURE — 25000003 PHARM REV CODE 250: Performed by: OBSTETRICS & GYNECOLOGY

## 2024-11-30 PROCEDURE — 99231 SBSQ HOSP IP/OBS SF/LOW 25: CPT | Mod: ,,,

## 2024-11-30 PROCEDURE — 11000001 HC ACUTE MED/SURG PRIVATE ROOM

## 2024-11-30 PROCEDURE — 25000003 PHARM REV CODE 250: Performed by: ADVANCED PRACTICE MIDWIFE

## 2024-11-30 RX ADMIN — NIFEDIPINE 60 MG: 30 TABLET, FILM COATED, EXTENDED RELEASE ORAL at 08:11

## 2024-11-30 RX ADMIN — IBUPROFEN 800 MG: 800 TABLET, FILM COATED ORAL at 10:11

## 2024-11-30 RX ADMIN — IBUPROFEN 800 MG: 800 TABLET, FILM COATED ORAL at 09:11

## 2024-11-30 RX ADMIN — IBUPROFEN 800 MG: 800 TABLET, FILM COATED ORAL at 05:11

## 2024-11-30 RX ADMIN — ACETAMINOPHEN 650 MG: 325 TABLET ORAL at 05:11

## 2024-11-30 RX ADMIN — NIFEDIPINE 60 MG: 30 TABLET, FILM COATED, EXTENDED RELEASE ORAL at 09:11

## 2024-11-30 NOTE — LACTATION NOTE
Lactation rounds- Mother sitting in bed eating breakfast. Mother in good spirits and states she's feeling much better today. Mother states she's been pumping and hand expressing and getting a few drops to bring to infant in NICU. Mother reports pumping is painfree but that hand expression is painful.     Asked mother to demonstrate how she is hand expressing. Mother is moving along her breast at skin level and ending at her nipple where she is squeezing her nipple. Mother was taught hand expression of breastmilk/colostrum. She was instructed to:  Sit upright and lean forward, if possible.  When feasible, apply warm, wet compress over breasts for a few minutes.   Perform gentle breast massage.  Form a C with her hand and place it about 1 inch back from the areola with the nipple centered between her index finger and her thumb.  Press, compress, relax:  Using her finger and thumb, apply pressure in an inward direction toward the breast without stretching the tissue, compress the breast tissue between her finger and thumb, then relax her finger and thumb. Repeat process for a few minutes.  Rotate placement of finger and thumb on the breasts to facilitate emptying.  Collect expressed breastmilk/colostrum with a spoon or cup and feed immediately to the baby, if able.  If unable to feed immediately, place breastmilk/colostrum directly into a sterile storage container for later use. Place the babys breast milk label (with the date and time of collection and the names of mother's medications) on the container. Reviewed proper handling and storage of expressed breastmilk.   Patient effectively return demonstrated and verbalized understanding. Drops noted at nipples. Mother denies any pain with improved technique.     Possible discharge today. Mother has no concerns with pumping at this time. Reviewed proper usage and to adjust suction according to comfort level. Reviewed with mother frequency and duration of pumping in  order to promote and maintain full milk supply. Hands on pumping technique reviewed. . Instructed mother on cleaning of breast pump parts. Reviewed proper milk handling, collection, storage, and transportation. Voices understanding.     Written instructions have been provided and were reviewed at this time. Lactation discharge booklet reviewed.  Reviewed breast massage and compression during pumping and indications for use. Reviewed signs of engorgement and expectant management. Reviewed signs of mastitis and instructed mother to call OB provider and lactation if any signs present. Discussed proper use of First Alert Form. Reviewed nursing diet and nutrition. Discussed resources for medication safety while breastfeeding. Reviewed available outpatient lactation resources. Informed mother that lactation will follow up with her weekly as a NICU mom to assure pumping continues to go well and she doesn't have any problems or concerns.     Mother is aware of warm line and outpatient consultations. Encouraged mother to contact lactation with any questions, concerns, or problems. Contact numbers provided, and mother verbalizes understanding.     Bag of bottles, syringes of various sizes, breast milk labels (mother confirmed correct name on labels), and 2 sterilizer bags provided to mother.     Mother verbalizes understanding of all education and counseling. Mother denies any further lactation needs or concerns at this time. Discussed lactation availability. Encouraged mother to call for assistance when needs arise.    Primary nurse, rah Jackman.

## 2024-11-30 NOTE — SUBJECTIVE & OBJECTIVE
"Interval History:     She is doing well this morning. She is tolerating a regular diet without nausea or vomiting. She is voiding spontaneously. She is ambulating. She has passed flatus, and has a BM. Vaginal bleeding is mild. She denies fever or chills. Abdominal pain is mild and controlled with oral medications. She Is pumping while infant in NICU. She desires circumcision for her male baby: yes.    Objective:     Vital Signs (Most Recent):  Temp: 97.5 °F (36.4 °C) (11/30/24 0712)  Pulse: 104 (11/30/24 0712)  Resp: 18 (11/30/24 0712)  BP: 120/80 (11/30/24 0712)  SpO2: 96 % (11/30/24 0712) Vital Signs (24h Range):  Temp:  [97.5 °F (36.4 °C)-99.3 °F (37.4 °C)] 97.5 °F (36.4 °C)  Pulse:  [] 104  Resp:  [14-18] 18  SpO2:  [96 %-99 %] 96 %  BP: (120-138)/(80-98) 120/80        There is no height or weight on file to calculate BMI.      Intake/Output Summary (Last 24 hours) at 11/30/2024 1134  Last data filed at 11/29/2024 1620  Gross per 24 hour   Intake --   Output 2200 ml   Net -2200 ml         Significant Labs:  Lab Results   Component Value Date    GROUPTRH O POS 11/25/2024    HEPBSAG Non-reactive 05/06/2024     No results for input(s): "HGB", "HCT" in the last 48 hours.    I have personallly reviewed all pertinent lab results from the last 24 hours.    Physical Exam:   Constitutional: She is oriented to person, place, and time. She appears well-developed and well-nourished.       Cardiovascular:  Normal rate.           Pulse Score: 2+   Pulmonary/Chest: Effort normal. No respiratory distress.        Abdominal: Soft.     Genitourinary:    Vagina and uterus normal.             Musculoskeletal: Moves all extremeties. Edema present.      Comments: RUE non pitting edema noted        Neurological: She is alert and oriented to person, place, and time.    Skin: Skin is warm and dry.    Psychiatric: She has a normal mood and affect.       Review of Systems  "

## 2024-11-30 NOTE — PROGRESS NOTES
[facility] ([facility])  Obstetrics  Postpartum Progress Note    Patient Name: Maria De Jesus Payne  MRN: [id number]  Admission Date: 11/13/2024  Hospital Length of Stay: 12 days  Attending Physician: Jasson Pineda MD  Primary Care Provider: Abena, Primary Doctor    Subjective:     Principal Problem:Severe pre-eclampsia in third trimester    Hospital Course:  Pt admitted to OB service with preeclampsia.  Urine P/C ratio 0.53.  Labs wnl.  Ultrasound 11/13/24 consistent with IUGR (EFW 1480 g at the 10th%, AC 3rd%), UA doppler wnl  BMZ on 11/13 and 11/14 11/14/24: HD#2, asymptomatic.  BP wnl.  Pt to complete BMZ series today  11/15/24:  Blood pressures elevated to severe range.  Magnesium sulfate given.  11/16/24: Blood pressures normal to mild range today. Episode of shortness of breath, fatigue, diminished reflexes, mag stopped, Mag level 5.8, restarted magnesium at 1g/hr  11/17/24 - Procardia XL 30 mg BID initiated for blood pressure control.  Blood pressures stable.  Patient asymptomatic.  11/18/24: BP stable on current regimen. JAD 12.8. MVP 4.9 cm. Breech presentation. No evidence of previa. Anterior placenta. Biophysical profile score 8/8 11/19/24--tacchycardia, shortness of breath and chest pain, neg CXR, elevated d dimer, neg resp panel, neg CTA-p; small pleural effusion; chest pain improved with ativan;   11/21/24--BPP 8/8, breech, umbilical artery dopplers wnl  11/23/24--denies preE symtpoms, bp stable on procardia 30mg bid  11/24/24-denies preE stympoms, bp elevated on procardia 30bid, but no severe range pressures  11/25/24- Denies preE symptoms, continue procardia 30BID, BPP 8/8, vertex, dopplers normal, pre-eclampsia labs stable, no severe range blood pressures.;   11/26/24:Doing well today. Denies any s/s of pre-e. Continue procardia. Bedside US performed, vertex presentation. Cervix FT/Thick/hard. Will plan to start IOL to start with cytotec. Dicussed POC with MD and patient. All  agreeable. GBS not tested, so will do PPX treatment with Pcn.   11/27/24 Continue with Cytotec PO Q 4 hr  11/28/24 0745-s/p cervidil overnight, VE 1/70/-2 CRB placed, start pitocin, hydrate for epidural, BP stable  11/29/24 PPD 1: routine PP care. Bps mildly elevated s/p 12 hours Mg+. Procardia dose increased 60 BID. Pt reports mild HA with significant facial and extremity swelling , will continue to monitor  11/30/24 PPD2: routine PP orders.  BP stable on procardia 60mg BID. Will continue after discharge. MgSO4 d/c'd at 0300. Facial swelling and BLE swelling has resolved. Right arm appears to still have some non pitting edema.  Will continue to monitor. Discussed plans to additional night to monitor swelling and BP.  Will plan for D/C tomorrow with BP check in 3 days. Patient verbalized understanding and is agreeable with plan.     Interval History:     She is doing well this morning. She is tolerating a regular diet without nausea or vomiting. She is voiding spontaneously. She is ambulating. She has passed flatus, and has a BM. Vaginal bleeding is mild. She denies fever or chills. Abdominal pain is mild and controlled with oral medications. She Is pumping while infant in NICU. She desires circumcision for her male baby: yes.    Objective:     Vital Signs (Most Recent):  Temp: 97.5 °F (36.4 °C) (11/30/24 0712)  Pulse: 104 (11/30/24 0712)  Resp: 18 (11/30/24 0712)  BP: 120/80 (11/30/24 0712)  SpO2: 96 % (11/30/24 0712) Vital Signs (24h Range):  Temp:  [97.5 °F (36.4 °C)-99.3 °F (37.4 °C)] 97.5 °F (36.4 °C)  Pulse:  [] 104  Resp:  [14-18] 18  SpO2:  [96 %-99 %] 96 %  BP: (120-138)/(80-98) 120/80        There is no height or weight on file to calculate BMI.      Intake/Output Summary (Last 24 hours) at 11/30/2024 1134  Last data filed at 11/29/2024 1620  Gross per 24 hour   Intake --   Output 2200 ml   Net -2200 ml         Significant Labs:  Lab Results   Component Value Date    GROUPANNIH TARSHA POS 11/25/2024     "HEPBSAG Non-reactive 2024     No results for input(s): "HGB", "HCT" in the last 48 hours.    I have personallly reviewed all pertinent lab results from the last 24 hours.    Physical Exam:   Constitutional: She is oriented to person, place, and time. She appears well-developed and well-nourished.       Cardiovascular:  Normal rate.           Pulse Score: 2+   Pulmonary/Chest: Effort normal. No respiratory distress.        Abdominal: Soft.     Genitourinary:    Vagina and uterus normal.             Musculoskeletal: Moves all extremeties. Edema present.      Comments: RUE non pitting edema noted        Neurological: She is alert and oriented to person, place, and time.    Skin: Skin is warm and dry.    Psychiatric: She has a normal mood and affect.       Review of Systems  Assessment/Plan:     19 y.o. female  for:    * Severe pre-eclampsia in third trimester  24 Bps mildly elevated s/p 12 hours Mg+. Procardia dose increased 60 BID. Pt reports mild HA with significant facial and extremity swelling , will continue to monitor  24 PPD2: routine PP orders.  BP stable on procardia 60mg BID. Will continue after discharge. MgSO4 d/c'd at 0300. Facial swelling and BLE swelling has resolved. Right arm appears to still have some non pitting edema.  Will continue to monitor. Discussed plans to additional night to monitor swelling and BP.  Will plan for D/C tomorrow with BP check in 3 days. Patient verbalized understanding and is agreeable with plan.      (normal spontaneous vaginal delivery)  Routine PP care     delivery (maternal condition)  Care of infant under NICU team    Nasal sinus congestion  2024  Add zyrtec and flonase  2024  Feels better  Continue zyrtec/flonase    Tachycardia  2024  Negative EKG, chest xray, cta    Poor fetal growth affecting management of mother in third trimester  US on  - 1480 g (10%), AC 3%, BPP 8/8, MVP 7.6, S:D nL, " cephalic              Disposition: As patient meets milestones, will plan to discharge tomorrow.    Gwen Dominguez CNM  Obstetrics  [facility] ([facility])

## 2024-11-30 NOTE — ASSESSMENT & PLAN NOTE
11/29/24 Bps mildly elevated s/p 12 hours Mg+. Procardia dose increased 60 BID. Pt reports mild HA with significant facial and extremity swelling , will continue to monitor  11/30/24 PPD2: routine PP orders.  BP stable on procardia 60mg BID. Will continue after discharge. MgSO4 d/c'd at 0300. Facial swelling and BLE swelling has resolved. Right arm appears to still have some non pitting edema.  Will continue to monitor. Discussed plans to additional night to monitor swelling and BP.  Will plan for D/C tomorrow with BP check in 3 days. Patient verbalized understanding and is agreeable with plan.

## 2024-11-30 NOTE — PLAN OF CARE
Blood pressure has been stable this shift. Patient plan of care ongoing.    Problem: Postpartum (Vaginal Delivery)  Goal: Anesthesia/Sedation Recovery  Outcome: Met  Goal: Effective Urinary Elimination  Outcome: Met     Problem: Adult Inpatient Plan of Care  Goal: Plan of Care Review  Outcome: Progressing  Goal: Patient-Specific Goal (Individualized)  Outcome: Progressing  Goal: Absence of Hospital-Acquired Illness or Injury  Outcome: Progressing  Goal: Optimal Comfort and Wellbeing  Outcome: Progressing  Goal: Readiness for Transition of Care  Outcome: Progressing     Problem:  Fall Injury Risk  Goal: Absence of Fall, Infant Drop and Related Injury  Outcome: Progressing     Problem: Hospitalized  Patient  Goal: Optimal Patient-Fetal Wellbeing  Outcome: Progressing     Problem: Skin Injury Risk Increased  Goal: Skin Health and Integrity  Outcome: Progressing     Problem: Hypertensive Disorders in Pregnancy  Goal: Patient-Fetal Stabilization  Outcome: Progressing     Problem: Infection  Goal: Absence of Infection Signs and Symptoms  Outcome: Progressing     Problem: Breastfeeding  Goal: Effective Breastfeeding  Outcome: Progressing     Problem: Postpartum (Vaginal Delivery)  Goal: Successful Parent Role Transition  Outcome: Progressing  Goal: Hemostasis  Outcome: Progressing  Goal: Absence of Infection Signs and Symptoms  Outcome: Progressing  Goal: Optimal Pain Control and Function  Outcome: Progressing

## 2024-12-01 VITALS
RESPIRATION RATE: 18 BRPM | HEART RATE: 111 BPM | TEMPERATURE: 98 F | DIASTOLIC BLOOD PRESSURE: 89 MMHG | OXYGEN SATURATION: 100 % | SYSTOLIC BLOOD PRESSURE: 144 MMHG

## 2024-12-01 PROCEDURE — 99238 HOSP IP/OBS DSCHRG MGMT 30/<: CPT | Mod: ,,, | Performed by: MIDWIFE

## 2024-12-01 PROCEDURE — 25000003 PHARM REV CODE 250: Performed by: OBSTETRICS & GYNECOLOGY

## 2024-12-01 RX ORDER — NIFEDIPINE 60 MG/1
60 TABLET, EXTENDED RELEASE ORAL 2 TIMES DAILY
Qty: 60 TABLET | Refills: 2 | Status: SHIPPED | OUTPATIENT
Start: 2024-12-01 | End: 2025-12-01

## 2024-12-01 RX ORDER — IBUPROFEN 600 MG/1
600 TABLET ORAL EVERY 6 HOURS PRN
Qty: 60 TABLET | Refills: 0 | Status: SHIPPED | OUTPATIENT
Start: 2024-12-01

## 2024-12-01 RX ADMIN — IBUPROFEN 800 MG: 800 TABLET, FILM COATED ORAL at 05:12

## 2024-12-01 RX ADMIN — NIFEDIPINE 60 MG: 30 TABLET, FILM COATED, EXTENDED RELEASE ORAL at 08:12

## 2024-12-01 RX ADMIN — IBUPROFEN 800 MG: 800 TABLET, FILM COATED ORAL at 09:12

## 2024-12-01 NOTE — DISCHARGE SUMMARY
[facility] ([facility])  Obstetrics  Discharge Summary      Patient Name: Maria De Jesus Payne  MRN: [id number]  Admission Date: 2024  Hospital Length of Stay: 13 days  Discharge Date and Time:  2024 9:44 AM  Attending Physician: Jasson Pineda MD   Discharging Provider: Yeni Brooke CNM   Primary Care Provider: Abena, Primary Doctor    HPI: 18 yo G1 with IUP at 32w0d.  Current pregnancy complicated by GHTN.  Pt was sent from clinic secondary to elevated BP in clinic.  Pt denies headaches or vision changes on admission.  Has noted some swelling.  Otherwise no complaints.        Procedure(s) (LRB):   SECTION (N/A)     Hospital Course:   Pt admitted to OB service with preeclampsia.  Urine P/C ratio 0.53.  Labs wnl.  Ultrasound 24 consistent with IUGR (EFW 1480 g at the 10th%, AC 3rd%), UA doppler wnl  BMZ on  and 24: HD#2, asymptomatic.  BP wnl.  Pt to complete BMZ series today  11/15/24:  Blood pressures elevated to severe range.  Magnesium sulfate given.  24: Blood pressures normal to mild range today. Episode of shortness of breath, fatigue, diminished reflexes, mag stopped, Mag level 5.8, restarted magnesium at 1g/hr  24 - Procardia XL 30 mg BID initiated for blood pressure control.  Blood pressures stable.  Patient asymptomatic.  24: BP stable on current regimen. JAD 12.8. MVP 4.9 cm. Breech presentation. No evidence of previa. Anterior placenta. Biophysical profile score 824--tacchycardia, shortness of breath and chest pain, neg CXR, elevated d dimer, neg resp panel, neg CTA-p; small pleural effusion; chest pain improved with ativan;   24--BPP 8/, breech, umbilical artery dopplers wnl  24--denies preE symtpoms, bp stable on procardia 30mg bid  24-denies preE stympoms, bp elevated on procardia 30bid, but no severe range pressures  24- Denies preE symptoms, continue procardia 30BID, BPP , vertex,  dopplers normal, pre-eclampsia labs stable, no severe range blood pressures.;   24:Doing well today. Denies any s/s of pre-e. Continue procardia. Bedside US performed, vertex presentation. Cervix FT/Thick/hard. Will plan to start IOL to start with cytotec. Dicussed POC with MD and patient. All agreeable. GBS not tested, so will do PPX treatment with Pcn.   24 Continue with Cytotec PO Q 4 hr  24 0745-s/p cervidil overnight, VE /-2 CRB placed, start pitocin, hydrate for epidural, BP stable  24 PPD 1: routine PP care. Bps mildly elevated s/p 12 hours Mg+. Procardia dose increased 60 BID. Pt reports mild HA with significant facial and extremity swelling , will continue to monitor  24 PPD2: routine PP orders.  BP stable on procardia 60mg BID. Will continue after discharge. MgSO4 d/c'd at 0300. Facial swelling and BLE swelling has resolved. Right arm appears to still have some non pitting edema.  Will continue to monitor. Discussed plans to additional night to monitor swelling and BP.  Will plan for D/C tomorrow with BP check in 3 days. Patient verbalized understanding and is agreeable with plan.   24: PPD3: Routine pp Orders. BP WNL. Pt stable for discharge. BP check in 3 days. Follow up with midwife in 4 weeks. Discharge instructions reviewed including PIH, PPD, PPH, and pain.         Final Active Diagnoses:    Diagnosis Date Noted POA    PRINCIPAL PROBLEM:   (normal spontaneous vaginal delivery) [O80] 2024 Not Applicable     delivery (maternal condition) [O60.10X0] 2024 No    Poor fetal growth affecting management of mother in third trimester [O36.5930] 2024 Yes    Severe pre-eclampsia in third trimester [O14.13] 10/17/2024 Yes      Problems Resolved During this Admission:    Diagnosis Date Noted Date Resolved POA    Breech presentation [O32.1XX0] 2024 No    Antepartum anemia [O99.019] 2024 Yes        Significant  "Diagnostic Studies: N/A      Feeding Method: both breast and bottle    Immunizations       Date Immunization Status Dose Route/Site Given by    24 1610 MMR Incomplete 0.5 mL Subcutaneous/     24 1610 Tdap Incomplete 0.5 mL Intramuscular/             Delivery:    Episiotomy: None   Lacerations: None   Repair suture: None   Repair # of packets:     Blood loss (ml):       Birth information:  YOB: 2026   Time of birth: 2:46 PM   Sex: male   Delivery type: Vaginal, Spontaneous   Gestational Age: 34w1d     Measurements    Weight: 1840 g  Weight (lbs): 4 lb 0.9 oz  Length: 44 cm  Length (in): 17.32"  Head circumference: 30.5 cm  Chest circumference: 28 cm  Abdominal girth: 24 cm         Delivery Clinician: Delivery Providers    Delivering clinician: Yulissa Pina CNM   Provider Role    Candy Batista, RN Registered Nurse    Tabby Casey, [facility] Tech    Karlo, Mae GARRISON, RN Registered Nurse    Seth Mckeon, RRT Respiratory Therapist    Shila Amaya, Patient Care Assistant Respiratory Therapist    George Bravo, RN Registered Nurse    Evelyn Marley, [facility] Nurse Practitioner             Additional  information:  Forceps:    Vacuum:    Breech:    Observed anomalies      Living?:     Apgars    Living status: Living  Apgar Component Scores:  1 min.:  5 min.:  10 min.:  15 min.:  20 min.:    Skin color:  0  1       Heart rate:  2  2       Reflex irritability:  2  2       Muscle tone:  2  2       Respiratory effort:  2  2       Total:  8  9       Apgars assigned by: [facility] KAUFMAN         Placenta: Delivered:       appearance  Pending Diagnostic Studies:       Procedure Component Value Units Date/Time    Protein / creatinine ratio, urine [[id number]] Collected: 11/15/24 1645    Order Status: Sent Lab Status: In process Updated: 11/15/24 1645    Specimen: Urine, Clean Catch     RESPIRATORY PATHOGENS PANEL (TEM-PCR) Nasopharyngeal Swab [[id number]] Collected: 24    Order " Status: Sent Lab Status: In process Updated: 11/18/24 2209    Specimen to Pathology, Surgery Gynecology and Obstetrics [[id number]] Collected: 11/28/24 1515    Order Status: Sent Lab Status: In process Updated: 11/29/24 0732    Specimen: Tissue             Discharged Condition: good    Disposition: Home or Self Care    Follow Up:    Patient Instructions:      BREAST PUMP FOR HOME USE     Order Specific Question Answer Comments   Type of pump: Hospital grade electric    Weight: 90.3kg    Length of need (1-99 months): 99      Pelvic Rest     Notify your health care provider if you experience any of the following:  temperature >100.4     Notify your health care provider if you experience any of the following:  persistent nausea and vomiting or diarrhea     Notify your health care provider if you experience any of the following:  severe uncontrolled pain     Notify your health care provider if you experience any of the following:  difficulty breathing or increased cough     Notify your health care provider if you experience any of the following:  severe persistent headache     Notify your health care provider if you experience any of the following:  persistent dizziness, light-headedness, or visual disturbances     Notify your health care provider if you experience any of the following:  increased confusion or weakness     Notify your health care provider if you experience any of the following:   Order Comments: .l     Medications:  Current Discharge Medication List        START taking these medications    Details   ibuprofen (ADVIL,MOTRIN) 600 MG tablet Take 1 tablet (600 mg total) by mouth every 6 (six) hours as needed for Pain.  Qty: 60 tablet, Refills: 0      NIFEdipine (PROCARDIA-XL) 60 MG (OSM) 24 hr tablet Take 1 tablet (60 mg total) by mouth 2 (two) times daily.  Qty: 60 tablet, Refills: 2    Comments: .           CONTINUE these medications which have NOT CHANGED    Details   metoclopramide HCl (REGLAN) 10 MG  tablet Take 1 tablet (10 mg total) by mouth every 6 (six) hours as needed (headache or nausea).  Qty: 30 tablet, Refills: 0             Yeni Brooke CNM  Obstetrics  [facility] ([facility])    ARI Kapadia

## 2024-12-01 NOTE — PLAN OF CARE
Patient afebrile and had no falls this shift. Fundus firm without massage and below umbilicus. Bleeding light, no clots passed this shift. Voids spontaneously. Ambulates independently. Pain well controlled with oral pain medication. Vital signs stable at this time. Infant in NICU. Patient visits during the day. Will continue to monitor.

## 2024-12-01 NOTE — LACTATION NOTE
Lactation rounds- Mother and significant other sitting in bed watching TV together. Mother states she's feeling much better and feels like her swelling as gone down significantly. Mother states she's still just getting drops but is able to hand express more successfully and it is no longer painful. Mother states she hasn't been pumping overnight so she can rest. Discussed importance of pumping around the clock and informed her that her hormone levels are highest overnight for milk production so it is especially important to pump overnight. Informed mother that she can do every 4 hours overnight but to not go longer than that between pumping sessions. Instructed mother to set alarms to remind herself to pump throughout the day as well as overnight.     Mother originally given discharge teaching on 11/30/24 by myself. Discharge instructions reviewed with mom. Mother verbalized she has no concerns at this time. Mother verbalized that pumping is going well and she isn't experiencing any pain and/or discomfort. Mother states that she has no questions at this time. Nurse offered mother opportunity for questions. Contact information for lactation given and nurse instructed mother call for assistance if need arises. Also reminded mother she can ask for lactation any time she visits the NICU and we will follow up with her weekly as long as infant is in the NICU.     Mother verbalizes understanding of all education and counseling. Mother denies any further lactation needs or concerns at this time. Discussed lactation availability. Encouraged mother to call for assistance when needs arise.    Primary nurse, rah Jackman.

## 2024-12-01 NOTE — ASSESSMENT & PLAN NOTE
11/29/24 Bps mildly elevated s/p 12 hours Mg+. Procardia dose increased 60 BID. Pt reports mild HA with significant facial and extremity swelling , will continue to monitor  11/30/24 PPD2: routine PP orders.  BP stable on procardia 60mg BID. Will continue after discharge. MgSO4 d/c'd at 0300. Facial swelling and BLE swelling has resolved. Right arm appears to still have some non pitting edema.  Will continue to monitor. Discussed plans to additional night to monitor swelling and BP.  Will plan for D/C tomorrow with BP check in 3 days. Patient verbalized understanding and is agreeable with plan.   12/1/24: BP WNL. Pt stable for discharge. BP check in 3 days. Follow up with midwife in 4 weeks. Discharge instructions reviewed including PIH, PPD, PPH, and pain.

## 2024-12-02 LAB
FINAL PATHOLOGIC DIAGNOSIS: NORMAL
GROSS: NORMAL
Lab: NORMAL

## 2024-12-03 ENCOUNTER — POSTPARTUM VISIT (OUTPATIENT)
Dept: OBSTETRICS AND GYNECOLOGY | Facility: CLINIC | Age: 19
End: 2024-12-03
Payer: MEDICAID

## 2024-12-03 VITALS — SYSTOLIC BLOOD PRESSURE: 126 MMHG | DIASTOLIC BLOOD PRESSURE: 82 MMHG

## 2024-12-03 DIAGNOSIS — O14.13 SEVERE PRE-ECLAMPSIA IN THIRD TRIMESTER: Primary | ICD-10-CM

## 2024-12-03 PROCEDURE — 99999 PR PBB SHADOW E&M-EST. PATIENT-LVL II: CPT | Mod: PBBFAC,,, | Performed by: PHYSICIAN ASSISTANT

## 2024-12-03 PROCEDURE — 99212 OFFICE O/P EST SF 10 MIN: CPT | Mod: PBBFAC,TH | Performed by: PHYSICIAN ASSISTANT

## 2024-12-03 NOTE — PROGRESS NOTES
OBGYN Post-op Clinic  History and Physical    Patient Name: Maria De Jesus Payne  YOB: 2005 (19 y.o.)  MRN: [id number]  Today's Date: 2024    Referring Md:   No referring provider defined for this encounter.    SUBJECTIVE:     Chief Complaint: BP Check    History of Present Illness:  Maria De Jesus Payne is a 19 y.o. female  who presents to the clinic today for BP check. S/p delivery on 24. Takes Procarida 60mg BID for blood pressure. BP looks great, denies pre-e signs and symptoms. Does report discomfort due to engorgement  from milk; does not want to pump anymore.       Review of patient's allergies indicates:  No Known Allergies    Past Medical History:   Diagnosis Date    ASCUS of cervix with negative high risk HPV 04/15/2024    Repeat pap in 6 months, 10/22/24    Irregular menstrual cycle     Labial cyst     lanced     History reviewed. No pertinent surgical history.  Family History   Problem Relation Name Age of Onset    Diabetes Father      No Known Problems Mother       Social History     Tobacco Use    Smoking status: Never     Passive exposure: Never    Smokeless tobacco: Never   Substance Use Topics    Alcohol use: Never    Drug use: Not Currently        Review of Systems:  Review of Systems   Constitutional:  Negative for chills and fever.   Eyes:  Negative for blurred vision and double vision.   Respiratory:  Negative for cough and shortness of breath.    Cardiovascular:  Negative for chest pain and leg swelling.   Gastrointestinal:  Negative for abdominal pain, constipation, diarrhea, nausea and vomiting.   Genitourinary:  Negative for dysuria.   Skin:  Negative for rash.   Neurological:  Negative for weakness and headaches.       OBJECTIVE:     Vital Signs (Most Recent)  /82   LMP 2024     Physical Exam  Vitals reviewed.   Constitutional:       General: She is not in acute distress.     Appearance: Normal appearance. She is not ill-appearing or  toxic-appearing.   HENT:      Head: Normocephalic and atraumatic.   Eyes:      Extraocular Movements: Extraocular movements intact.      Conjunctiva/sclera: Conjunctivae normal.   Pulmonary:      Effort: Pulmonary effort is normal. No respiratory distress.   Musculoskeletal:         General: Normal range of motion.      Cervical back: Normal range of motion.   Neurological:      General: No focal deficit present.      Mental Status: She is alert and oriented to person, place, and time.   Psychiatric:         Mood and Affect: Mood normal.         Behavior: Behavior normal.         Thought Content: Thought content normal.         Judgment: Judgment normal.             ASSESSMENT/PLAN:     Maria De Jesus Payne is a 19 y.o. female was seen today for blood pressure check. Doing very well. Continue current medications. Advised tight wrap for chest and ice packs to help relieve discomfort.   Reviewed pre-e signs and symptoms including severe or persistent headache, dark spots in vision, chest tightness, nausea, and unexpected swelling. Advised patient to present to the hospital if these symptoms arise, particularly in the setting of elevated BP. Instructed patient to present to the hospital if BP >150/100 regardless of symptoms. Patient verbalized understanding.     Diagnoses and all orders for this visit:    Severe pre-eclampsia in third trimester       - Continue Procarida 60mg BID       - F/u as previously scheduled or sooner PRN     (normal spontaneous vaginal delivery)          RAUL Stockton, PAEricksonC  OBGYN - Surgery  [facility]

## 2024-12-08 ENCOUNTER — LACTATION ENCOUNTER (OUTPATIENT)
Dept: INTENSIVE CARE | Facility: HOSPITAL | Age: 19
End: 2024-12-08

## 2024-12-08 NOTE — LACTATION NOTE
This note was copied from a baby's chart.  Follow up lactation call:    Mother reports that pumping is going well with her Medela breast pump and she is able to pump every 3-4 hours and express around 40 mls each pumping session. Mother reports that she is pumping her breasts for 15 minutes. Discussed with mother to empty breasts and continue pumping for 3-5 minutes afterwards to help increase milk production. Also advised mother to pump breasts every 2-3 hours to help increase supply.     Mother verbalizes understanding of all education and counseling. Mother denies any further lactation needs or concerns at this time. Discussed lactation availability. Encouraged mother to call for assistance when needs arise.

## 2025-01-02 ENCOUNTER — HOSPITAL ENCOUNTER (EMERGENCY)
Facility: HOSPITAL | Age: 20
Discharge: HOME | End: 2025-01-02
Attending: EMERGENCY MEDICINE
Payer: MEDICAID

## 2025-01-02 VITALS
WEIGHT: 165 LBS | HEART RATE: 86 BPM | RESPIRATION RATE: 20 BRPM | BODY MASS INDEX: 26.63 KG/M2 | DIASTOLIC BLOOD PRESSURE: 87 MMHG | OXYGEN SATURATION: 96 % | TEMPERATURE: 98 F | SYSTOLIC BLOOD PRESSURE: 124 MMHG

## 2025-01-02 DIAGNOSIS — M79.604 RIGHT LEG PAIN: Primary | ICD-10-CM

## 2025-01-02 DIAGNOSIS — M79.89 LEG SWELLING: ICD-10-CM

## 2025-01-02 LAB
ALBUMIN SERPL BCP-MCNC: 4.2 G/DL (ref 3.5–5.2)
ALP SERPL-CCNC: 77 U/L (ref 40–150)
ALT SERPL W/O P-5'-P-CCNC: 21 U/L (ref 10–44)
ANION GAP SERPL CALC-SCNC: 9 MMOL/L (ref 8–16)
AST SERPL-CCNC: 21 U/L (ref 10–40)
BASOPHILS # BLD AUTO: 0.03 K/UL (ref 0–0.2)
BASOPHILS NFR BLD: 0.5 % (ref 0–1.9)
BILIRUB SERPL-MCNC: 0.3 MG/DL (ref 0.1–1)
BNP SERPL-MCNC: <10 PG/ML (ref 0–99)
BUN SERPL-MCNC: 9 MG/DL (ref 6–20)
CALCIUM SERPL-MCNC: 10.4 MG/DL (ref 8.7–10.5)
CHLORIDE SERPL-SCNC: 107 MMOL/L (ref 95–110)
CO2 SERPL-SCNC: 23 MMOL/L (ref 23–29)
CREAT SERPL-MCNC: 0.9 MG/DL (ref 0.5–1.4)
DIFFERENTIAL METHOD BLD: ABNORMAL
EOSINOPHIL # BLD AUTO: 0.2 K/UL (ref 0–0.5)
EOSINOPHIL NFR BLD: 3.4 % (ref 0–8)
ERYTHROCYTE [DISTWIDTH] IN BLOOD BY AUTOMATED COUNT: 14.1 % (ref 11.5–14.5)
EST. GFR  (NO RACE VARIABLE): >60 ML/MIN/1.73 M^2
GLUCOSE SERPL-MCNC: 92 MG/DL (ref 70–110)
HCT VFR BLD AUTO: 46.3 % (ref 37–48.5)
HGB BLD-MCNC: 14.7 G/DL (ref 12–16)
IMM GRANULOCYTES # BLD AUTO: 0.01 K/UL (ref 0–0.04)
IMM GRANULOCYTES NFR BLD AUTO: 0.2 % (ref 0–0.5)
LYMPHOCYTES # BLD AUTO: 2.3 K/UL (ref 1–4.8)
LYMPHOCYTES NFR BLD: 35.6 % (ref 18–48)
MCH RBC QN AUTO: 27.5 PG (ref 27–31)
MCHC RBC AUTO-ENTMCNC: 31.7 G/DL (ref 32–36)
MCV RBC AUTO: 87 FL (ref 82–98)
MONOCYTES # BLD AUTO: 0.3 K/UL (ref 0.3–1)
MONOCYTES NFR BLD: 4.7 % (ref 4–15)
NEUTROPHILS # BLD AUTO: 3.6 K/UL (ref 1.8–7.7)
NEUTROPHILS NFR BLD: 55.6 % (ref 38–73)
NRBC BLD-RTO: 0 /100 WBC
PLATELET # BLD AUTO: 241 K/UL (ref 150–450)
PMV BLD AUTO: 11.3 FL (ref 9.2–12.9)
POTASSIUM SERPL-SCNC: 4.2 MMOL/L (ref 3.5–5.1)
PROT SERPL-MCNC: 8.2 G/DL (ref 6–8.4)
RBC # BLD AUTO: 5.35 M/UL (ref 4–5.4)
SODIUM SERPL-SCNC: 139 MMOL/L (ref 136–145)
TROPONIN I SERPL DL<=0.01 NG/ML-MCNC: 0.01 NG/ML (ref 0–0.03)
WBC # BLD AUTO: 6.43 K/UL (ref 3.9–12.7)

## 2025-01-02 PROCEDURE — 93010 ELECTROCARDIOGRAM REPORT: CPT | Mod: ,,, | Performed by: STUDENT IN AN ORGANIZED HEALTH CARE EDUCATION/TRAINING PROGRAM

## 2025-01-02 PROCEDURE — 83880 ASSAY OF NATRIURETIC PEPTIDE: CPT

## 2025-01-02 PROCEDURE — 93005 ELECTROCARDIOGRAM TRACING: CPT

## 2025-01-02 PROCEDURE — 84484 ASSAY OF TROPONIN QUANT: CPT

## 2025-01-02 PROCEDURE — 99285 EMERGENCY DEPT VISIT HI MDM: CPT | Mod: 25

## 2025-01-02 PROCEDURE — 80053 COMPREHEN METABOLIC PANEL: CPT

## 2025-01-02 PROCEDURE — 85025 COMPLETE CBC W/AUTO DIFF WBC: CPT

## 2025-01-02 RX ORDER — METHOCARBAMOL 500 MG/1
500 TABLET, FILM COATED ORAL 4 TIMES DAILY
Qty: 40 TABLET | Refills: 0 | Status: SHIPPED | OUTPATIENT
Start: 2025-01-02 | End: 2025-01-12

## 2025-01-02 NOTE — Clinical Note
"Maria De Jesus "Eranbogdan Payne was seen and treated in our emergency department on 1/2/2025.  She may return to work on 01/03/2025.       If you have any questions or concerns, please don't hesitate to call.      Yane Stokes PA-C"

## 2025-01-03 NOTE — ED PROVIDER NOTES
Encounter Date: 2025       History     Chief Complaint   Patient presents with    Leg Pain     Pain to right lower leg x one day, denies swelling or trauma.     19-year-old  female presenting to the emergency department complaining of right leg pain and swelling x1 day.  Patient delivered her child on 2024.  Patient states she is concerned for a clot in her leg.  Denies injury, recent travel, history of cancer, tobacco use, cough, shortness of breath, chest pain, fever, hemoptysis, abdominal pain, nausea, vomiting, and all other symptoms.    The history is provided by the patient.     Review of patient's allergies indicates:  No Known Allergies  Past Medical History:   Diagnosis Date    ASCUS of cervix with negative high risk HPV 04/15/2024    Repeat pap in 6 months, 10/22/24    Irregular menstrual cycle     Labial cyst     lanced     History reviewed. No pertinent surgical history.  Family History   Problem Relation Name Age of Onset    Diabetes Father      No Known Problems Mother       Social History     Tobacco Use    Smoking status: Never     Passive exposure: Never    Smokeless tobacco: Never   Substance Use Topics    Alcohol use: Never    Drug use: Not Currently     Review of Systems   Constitutional:  Negative for fever.   HENT:  Negative for sore throat.    Respiratory:  Negative for cough, chest tightness and shortness of breath.    Cardiovascular:  Negative for chest pain and palpitations.   Gastrointestinal:  Negative for abdominal pain, nausea and vomiting.   Genitourinary:  Negative for dysuria.   Musculoskeletal:  Positive for myalgias (Right leg pain). Negative for back pain.   Skin:  Negative for rash.   Neurological:  Negative for weakness.   Hematological:  Does not bruise/bleed easily.   All other systems reviewed and are negative.      Physical Exam     Initial Vitals [25 1756]   BP Pulse Resp Temp SpO2   124/87 86 20 97.5 °F (36.4 °C) 96 %      MAP       --          Physical Exam    Nursing note reviewed.  Constitutional: She appears well-developed and well-nourished.  Non-toxic appearance. She does not have a sickly appearance. She does not appear ill. No distress.   HENT:   Head: Normocephalic and atraumatic.   Right Ear: Hearing normal.   Left Ear: Hearing normal.   Nose: Nose normal. Mouth/Throat: Uvula is midline and oropharynx is clear and moist.   Eyes: Conjunctivae, EOM and lids are normal. Pupils are equal, round, and reactive to light.   Neck: Trachea normal. Neck supple.   Normal range of motion.   Full passive range of motion without pain.     Cardiovascular:  Normal rate, regular rhythm, normal heart sounds, intact distal pulses and normal pulses.           Pulmonary/Chest: Effort normal and breath sounds normal.   Abdominal: Abdomen is soft. Bowel sounds are normal. There is no abdominal tenderness. There is no rebound and no guarding.   Musculoskeletal:         General: Normal range of motion.      Cervical back: Normal, full passive range of motion without pain, normal range of motion and neck supple.      Comments: Right lower extremity:  No edema, erythema, ecchymosis, or open wounds.  No signs of cellulitis.  Calf tenderness noted; otherwise nontender to palpation.  No evidence of joint instability.  Full range of motion without pain.  Compartments are soft.  Patient is neurovascularly intact.     Neurological: She is alert and oriented to person, place, and time. She has normal strength and normal reflexes. No cranial nerve deficit or sensory deficit. GCS eye subscore is 4. GCS verbal subscore is 5. GCS motor subscore is 6.   Skin: Skin is warm and dry.   Psychiatric: She has a normal mood and affect. Her behavior is normal. Thought content normal.         ED Course   Procedures  Labs Reviewed   CBC W/ AUTO DIFFERENTIAL - Abnormal       Result Value    WBC 6.43      RBC 5.35      Hemoglobin 14.7      Hematocrit 46.3      MCV 87      MCH 27.5      MCHC  31.7 (*)     RDW 14.1      Platelets 241      MPV 11.3      Immature Granulocytes 0.2      Gran # (ANC) 3.6      Immature Grans (Abs) 0.01      Lymph # 2.3      Mono # 0.3      Eos # 0.2      Baso # 0.03      nRBC 0      Gran % 55.6      Lymph % 35.6      Mono % 4.7      Eosinophil % 3.4      Basophil % 0.5      Differential Method Automated     COMPREHENSIVE METABOLIC PANEL    Sodium 139      Potassium 4.2      Chloride 107      CO2 23      Glucose 92      BUN 9      Creatinine 0.9      Calcium 10.4      Total Protein 8.2      Albumin 4.2      Total Bilirubin 0.3      Alkaline Phosphatase 77      AST 21      ALT 21      eGFR >60      Anion Gap 9     TROPONIN I    Troponin I 0.006     B-TYPE NATRIURETIC PEPTIDE    BNP <10            Imaging Results              X-Ray Chest AP Portable (Final result)  Result time 01/02/25 19:57:25      Final result by Omar Yu MD (01/02/25 19:57:25)                   Impression:      No acute abnormality.      Electronically signed by: Omar Yu  Date:    01/02/2025  Time:    19:57               Narrative:    EXAMINATION:  XR CHEST AP PORTABLE    CLINICAL HISTORY:  leg swelling;    TECHNIQUE:  Single frontal view of the chest was performed.    COMPARISON:  None    FINDINGS:  The lungs are clear, with normal appearance of pulmonary vasculature and no pleural effusion or pneumothorax.    The cardiac silhouette is normal in size. The hilar and mediastinal contours are unremarkable.    Bones are intact.                                       US Lower Extremity Veins Right (Final result)  Result time 01/02/25 18:44:38      Final result by José Antonio Hernandez MD (01/02/25 18:44:38)                   Impression:      No evidence of deep venous thrombosis in the right lower extremity.      Electronically signed by: José Antonio Hernandez  Date:    01/02/2025  Time:    18:44               Narrative:    EXAMINATION:  US LOWER EXTREMITY VEINS RIGHT    CLINICAL HISTORY:  Right calf pain and  swelling;    TECHNIQUE:  Duplex and color flow Doppler evaluation and graded compression of the right lower extremity veins was performed.    COMPARISON:  None    FINDINGS:  Right thigh veins: The common femoral, femoral, popliteal, upper greater saphenous, and deep femoral veins are patent and free of thrombus. The veins are normally compressible and have normal phasic flow and augmentation response.    Right calf veins: The visualized calf veins are patent.    Contralateral CFV: The contralateral (left) common femoral vein is patent and free of thrombus.    Miscellaneous: None                                       Medications - No data to display  Medical Decision Making  Amount and/or Complexity of Data Reviewed  Labs: ordered.     Details: Labs unremarkable.  Radiology: ordered.     Details: Lower extremity veins ultrasound, right: no evidence of deep vein thrombosis in the right lower extremity.  Chest x-ray negative for acute abnormalities.    Risk  Prescription drug management.  Risk Details: I discussed with patient that evaluation in the ED does not suggest any emergent or life threatening medical conditions requiring immediate intervention beyond what was provided in the ED, and I believe patient is safe for discharge. Regardless, an unremarkable evaluation in the ED does not preclude the development or presence of a serious of life threatening condition. As such, patient was instructed to return immediately for any worsening or change in current symptoms.                                       Clinical Impression:  Final diagnoses:  [M79.89] Leg swelling  [M79.604] Right leg pain (Primary)          ED Disposition Condition    Discharge Stable          ED Prescriptions       Medication Sig Dispense Start Date End Date Auth. Provider    methocarbamoL (ROBAXIN) 500 MG Tab Take 1 tablet (500 mg total) by mouth 4 (four) times daily. for 10 days 40 tablet 1/2/2025 1/12/2025 Yane Stokes PA-C           Follow-up Information       Follow up With Specialties Details Why Contact Info    [facility] - Emergency Dept. Emergency Medicine  If symptoms worsen [id number] [facility] [id number] [phone]             Yane Stokes PA-C  01/03/25 0034

## 2025-01-05 ENCOUNTER — HOSPITAL ENCOUNTER (OUTPATIENT)
Facility: HOSPITAL | Age: 20
Discharge: HOME | End: 2025-01-06
Attending: EMERGENCY MEDICINE | Admitting: OBSTETRICS & GYNECOLOGY
Payer: MEDICAID

## 2025-01-05 DIAGNOSIS — N93.9 VAGINAL BLEEDING: ICD-10-CM

## 2025-01-05 PROBLEM — R87.610 ATYPICAL SQUAMOUS CELL CHANGES OF UNDETERMINED SIGNIFICANCE (ASCUS) ON CERVICAL CYTOLOGY WITH NEGATIVE HIGH RISK HUMAN PAPILLOMA VIRUS (HPV) TEST RESULT: Status: ACTIVE | Noted: 2025-01-05

## 2025-01-05 LAB
ABO + RH BLD: NORMAL
ALBUMIN SERPL BCP-MCNC: 4.1 G/DL (ref 3.5–5.2)
ALP SERPL-CCNC: 71 U/L (ref 40–150)
ALT SERPL W/O P-5'-P-CCNC: 24 U/L (ref 10–44)
ANION GAP SERPL CALC-SCNC: 9 MMOL/L (ref 8–16)
AST SERPL-CCNC: 19 U/L (ref 10–40)
B-HCG UR QL: NEGATIVE
BACTERIA #/AREA URNS HPF: ABNORMAL /HPF
BASOPHILS # BLD AUTO: 0.02 K/UL (ref 0–0.2)
BASOPHILS # BLD AUTO: 0.02 K/UL (ref 0–0.2)
BASOPHILS NFR BLD: 0.3 % (ref 0–1.9)
BASOPHILS NFR BLD: 0.3 % (ref 0–1.9)
BILIRUB SERPL-MCNC: 0.4 MG/DL (ref 0.1–1)
BILIRUB UR QL STRIP: NEGATIVE
BLD GP AB SCN CELLS X3 SERPL QL: NORMAL
BUN SERPL-MCNC: 10 MG/DL (ref 6–20)
CALCIUM SERPL-MCNC: 10 MG/DL (ref 8.7–10.5)
CHLORIDE SERPL-SCNC: 107 MMOL/L (ref 95–110)
CLARITY UR: ABNORMAL
CO2 SERPL-SCNC: 23 MMOL/L (ref 23–29)
COLOR UR: ABNORMAL
CREAT SERPL-MCNC: 0.9 MG/DL (ref 0.5–1.4)
DIFFERENTIAL METHOD BLD: ABNORMAL
DIFFERENTIAL METHOD BLD: NORMAL
EOSINOPHIL # BLD AUTO: 0.1 K/UL (ref 0–0.5)
EOSINOPHIL # BLD AUTO: 0.2 K/UL (ref 0–0.5)
EOSINOPHIL NFR BLD: 2 % (ref 0–8)
EOSINOPHIL NFR BLD: 3.1 % (ref 0–8)
ERYTHROCYTE [DISTWIDTH] IN BLOOD BY AUTOMATED COUNT: 13.7 % (ref 11.5–14.5)
ERYTHROCYTE [DISTWIDTH] IN BLOOD BY AUTOMATED COUNT: 14 % (ref 11.5–14.5)
EST. GFR  (NO RACE VARIABLE): >60 ML/MIN/1.73 M^2
GLUCOSE SERPL-MCNC: 101 MG/DL (ref 70–110)
GLUCOSE UR QL STRIP: NEGATIVE
HCT VFR BLD AUTO: 37 % (ref 37–48.5)
HCT VFR BLD AUTO: 42.4 % (ref 37–48.5)
HGB BLD-MCNC: 11.9 G/DL (ref 12–16)
HGB BLD-MCNC: 13.7 G/DL (ref 12–16)
HGB UR QL STRIP: ABNORMAL
HYALINE CASTS #/AREA URNS LPF: 0 /LPF
IMM GRANULOCYTES # BLD AUTO: 0.01 K/UL (ref 0–0.04)
IMM GRANULOCYTES # BLD AUTO: 0.02 K/UL (ref 0–0.04)
IMM GRANULOCYTES NFR BLD AUTO: 0.1 % (ref 0–0.5)
IMM GRANULOCYTES NFR BLD AUTO: 0.3 % (ref 0–0.5)
KETONES UR QL STRIP: NEGATIVE
LEUKOCYTE ESTERASE UR QL STRIP: NEGATIVE
LYMPHOCYTES # BLD AUTO: 1.8 K/UL (ref 1–4.8)
LYMPHOCYTES # BLD AUTO: 2.5 K/UL (ref 1–4.8)
LYMPHOCYTES NFR BLD: 31.3 % (ref 18–48)
LYMPHOCYTES NFR BLD: 36.1 % (ref 18–48)
MCH RBC QN AUTO: 27.6 PG (ref 27–31)
MCH RBC QN AUTO: 27.9 PG (ref 27–31)
MCHC RBC AUTO-ENTMCNC: 32.2 G/DL (ref 32–36)
MCHC RBC AUTO-ENTMCNC: 32.3 G/DL (ref 32–36)
MCV RBC AUTO: 86 FL (ref 82–98)
MCV RBC AUTO: 86 FL (ref 82–98)
MICROSCOPIC COMMENT: ABNORMAL
MONOCYTES # BLD AUTO: 0.3 K/UL (ref 0.3–1)
MONOCYTES # BLD AUTO: 0.3 K/UL (ref 0.3–1)
MONOCYTES NFR BLD: 4.4 % (ref 4–15)
MONOCYTES NFR BLD: 5 % (ref 4–15)
NEUTROPHILS # BLD AUTO: 3.5 K/UL (ref 1.8–7.7)
NEUTROPHILS # BLD AUTO: 3.9 K/UL (ref 1.8–7.7)
NEUTROPHILS NFR BLD: 57.1 % (ref 38–73)
NEUTROPHILS NFR BLD: 60 % (ref 38–73)
NITRITE UR QL STRIP: NEGATIVE
NRBC BLD-RTO: 0 /100 WBC
NRBC BLD-RTO: 0 /100 WBC
PH UR STRIP: 6 [PH] (ref 5–8)
PLATELET # BLD AUTO: 204 K/UL (ref 150–450)
PLATELET # BLD AUTO: 240 K/UL (ref 150–450)
PMV BLD AUTO: 10.8 FL (ref 9.2–12.9)
PMV BLD AUTO: 11 FL (ref 9.2–12.9)
POTASSIUM SERPL-SCNC: 3.9 MMOL/L (ref 3.5–5.1)
PROT SERPL-MCNC: 7.8 G/DL (ref 6–8.4)
PROT UR QL STRIP: ABNORMAL
RBC # BLD AUTO: 4.31 M/UL (ref 4–5.4)
RBC # BLD AUTO: 4.91 M/UL (ref 4–5.4)
RBC #/AREA URNS HPF: >100 /HPF (ref 0–4)
SODIUM SERPL-SCNC: 139 MMOL/L (ref 136–145)
SP GR UR STRIP: 1.02 (ref 1–1.03)
SPECIMEN OUTDATE: NORMAL
URN SPEC COLLECT METH UR: ABNORMAL
UROBILINOGEN UR STRIP-ACNC: NEGATIVE EU/DL
WBC # BLD AUTO: 5.81 K/UL (ref 3.9–12.7)
WBC # BLD AUTO: 6.89 K/UL (ref 3.9–12.7)
WBC #/AREA URNS HPF: 0 /HPF (ref 0–5)

## 2025-01-05 PROCEDURE — 25000003 PHARM REV CODE 250: Performed by: OBSTETRICS & GYNECOLOGY

## 2025-01-05 PROCEDURE — G0378 HOSPITAL OBSERVATION PER HR: HCPCS

## 2025-01-05 PROCEDURE — 99222 1ST HOSP IP/OBS MODERATE 55: CPT | Mod: ,,, | Performed by: OBSTETRICS & GYNECOLOGY

## 2025-01-05 PROCEDURE — 81000 URINALYSIS NONAUTO W/SCOPE: CPT | Performed by: NURSE PRACTITIONER

## 2025-01-05 PROCEDURE — 86900 BLOOD TYPING SEROLOGIC ABO: CPT | Performed by: NURSE PRACTITIONER

## 2025-01-05 PROCEDURE — 96375 TX/PRO/DX INJ NEW DRUG ADDON: CPT

## 2025-01-05 PROCEDURE — 96374 THER/PROPH/DIAG INJ IV PUSH: CPT

## 2025-01-05 PROCEDURE — 85025 COMPLETE CBC W/AUTO DIFF WBC: CPT | Mod: 91 | Performed by: OBSTETRICS & GYNECOLOGY

## 2025-01-05 PROCEDURE — 85025 COMPLETE CBC W/AUTO DIFF WBC: CPT | Performed by: NURSE PRACTITIONER

## 2025-01-05 PROCEDURE — 99285 EMERGENCY DEPT VISIT HI MDM: CPT | Mod: 25

## 2025-01-05 PROCEDURE — 96361 HYDRATE IV INFUSION ADD-ON: CPT

## 2025-01-05 PROCEDURE — 25000003 PHARM REV CODE 250: Performed by: NURSE PRACTITIONER

## 2025-01-05 PROCEDURE — 80053 COMPREHEN METABOLIC PANEL: CPT | Performed by: NURSE PRACTITIONER

## 2025-01-05 PROCEDURE — 81025 URINE PREGNANCY TEST: CPT | Performed by: NURSE PRACTITIONER

## 2025-01-05 PROCEDURE — 63600175 PHARM REV CODE 636 W HCPCS: Performed by: OBSTETRICS & GYNECOLOGY

## 2025-01-05 RX ORDER — MISOPROSTOL 200 UG/1
800 TABLET ORAL ONCE
Status: COMPLETED | OUTPATIENT
Start: 2025-01-05 | End: 2025-01-05

## 2025-01-05 RX ORDER — MISOPROSTOL 200 UG/1
800 TABLET ORAL ONCE
Status: DISCONTINUED | OUTPATIENT
Start: 2025-01-05 | End: 2025-01-05

## 2025-01-05 RX ORDER — TRANEXAMIC ACID 10 MG/ML
1000 INJECTION, SOLUTION INTRAVENOUS ONCE
Status: COMPLETED | OUTPATIENT
Start: 2025-01-05 | End: 2025-01-05

## 2025-01-05 RX ORDER — KETOROLAC TROMETHAMINE 30 MG/ML
30 INJECTION, SOLUTION INTRAMUSCULAR; INTRAVENOUS ONCE
Status: COMPLETED | OUTPATIENT
Start: 2025-01-05 | End: 2025-01-05

## 2025-01-05 RX ORDER — ONDANSETRON 8 MG/1
8 TABLET, ORALLY DISINTEGRATING ORAL EVERY 8 HOURS PRN
Status: DISCONTINUED | OUTPATIENT
Start: 2025-01-05 | End: 2025-01-06 | Stop reason: HOSPADM

## 2025-01-05 RX ORDER — NIFEDIPINE 30 MG/1
60 TABLET, EXTENDED RELEASE ORAL 2 TIMES DAILY
Status: DISCONTINUED | OUTPATIENT
Start: 2025-01-05 | End: 2025-01-06 | Stop reason: HOSPADM

## 2025-01-05 RX ORDER — SODIUM CHLORIDE 0.9 % (FLUSH) 0.9 %
10 SYRINGE (ML) INJECTION
Status: DISCONTINUED | OUTPATIENT
Start: 2025-01-05 | End: 2025-01-06 | Stop reason: HOSPADM

## 2025-01-05 RX ORDER — KETOROLAC TROMETHAMINE 30 MG/ML
30 INJECTION, SOLUTION INTRAMUSCULAR; INTRAVENOUS EVERY 6 HOURS
Status: DISPENSED | OUTPATIENT
Start: 2025-01-06 | End: 2025-01-06

## 2025-01-05 RX ORDER — KETOROLAC TROMETHAMINE 30 MG/ML
30 INJECTION, SOLUTION INTRAMUSCULAR; INTRAVENOUS ONCE
Status: DISCONTINUED | OUTPATIENT
Start: 2025-01-05 | End: 2025-01-05

## 2025-01-05 RX ADMIN — NIFEDIPINE 60 MG: 30 TABLET, FILM COATED, EXTENDED RELEASE ORAL at 09:01

## 2025-01-05 RX ADMIN — KETOROLAC TROMETHAMINE 30 MG: 30 INJECTION, SOLUTION INTRAMUSCULAR at 06:01

## 2025-01-05 RX ADMIN — TRANEXAMIC ACID 1000 MG: 10 INJECTION, SOLUTION INTRAVENOUS at 06:01

## 2025-01-05 RX ADMIN — MISOPROSTOL 800 MCG: 200 TABLET ORAL at 06:01

## 2025-01-05 RX ADMIN — SODIUM CHLORIDE 1000 ML: 9 INJECTION, SOLUTION INTRAVENOUS at 04:01

## 2025-01-05 NOTE — FIRST PROVIDER EVALUATION
"Medical screening examination initiated.  I have conducted a focused provider triage encounter, findings are as follows:    Brief history of present illness:  presents for heavy bleeding. Reports delivery several weeks ago    Vitals:    01/05/25 1335   BP: 119/78   BP Location: Right arm   Pulse: (!) 128   Resp: 18   Temp: 98.6 °F (37 °C)   SpO2: 99%   Weight: 80.7 kg (177 lb 14.6 oz)   Height: 5' 7" (1.702 m)       Pertinent physical exam:  tachy    Brief workup plan:  labs, further eval    Preliminary workup initiated; this workup will be continued and followed by the physician or advanced practice provider that is assigned to the patient when roomed.  "

## 2025-01-05 NOTE — ED PROVIDER NOTES
"SCRIBE #1 NOTE: I, Herrera Clark, am scribing for, and in the presence of, Zack Arenas MD. I have scribed the entire note.       History     Chief Complaint   Patient presents with    Vaginal Bleeding     Patient presents to the ED for complaints of vaginal bleeding and reports having a vaginal delivery six weeks ago. Patient complaining of lower abdominal cramping and discomfort.  Patient reports large clots this morning with heavy bleeding yesterday.     Review of patient's allergies indicates:  No Known Allergies      History of Present Illness     HPI    2025, 4:27 PM  History obtained from the patient      History of Present Illness: Maria De Jesus Payne is a 19 y.o. female patient who presents to the Emergency Department for evaluation of vaginal bleeding which onset yesterday. Pt gave birth recently (A0) via induced vaginal delivery. Pt was induced due to preeclampsia. Afterwards, pt reports bleeding slowed and ceased. Yesterday, however, pt reports bleeding returned and has worsened since. Pt also reports large "golf ball sized" clots of blood this AM. Pt denies any abd pain (some cramping earlier) and all other sxs at this time. Pt also denies any recent sexual intercourse. Pt is bottle feeding her baby. No further complaints or concerns at this time.       Arrival mode: Personal Transportation    PCP: No, Primary Doctor        Past Medical History:  Past Medical History:   Diagnosis Date    ASCUS of cervix with negative high risk HPV 04/15/2024    Repeat pap in 6 months, 10/22/24    Irregular menstrual cycle     Labial cyst     lanced       Past Surgical History:  History reviewed. No pertinent surgical history.      Family History:  Family History   Problem Relation Name Age of Onset    Diabetes Father      No Known Problems Mother         Social History:  Social History     Tobacco Use    Smoking status: Never     Passive exposure: Never    Smokeless tobacco: Never   Substance and " Sexual Activity    Alcohol use: Never    Drug use: Not Currently    Sexual activity: Yes     Partners: Male        Review of Systems     Review of Systems   Constitutional:  Negative for chills, diaphoresis and fever.   HENT:  Negative for congestion.    Respiratory:  Negative for cough and shortness of breath.    Cardiovascular:  Negative for chest pain.   Gastrointestinal:  Negative for abdominal pain, diarrhea, nausea and vomiting.   Genitourinary:  Positive for vaginal bleeding and vaginal discharge (large clots). Negative for dysuria.   Musculoskeletal:  Negative for back pain.   Skin:  Negative for rash.   Neurological:  Negative for dizziness, weakness and headaches.   All other systems reviewed and are negative.       Physical Exam     Initial Vitals [01/05/25 1335]   BP Pulse Resp Temp SpO2   119/78 (!) 128 18 98.6 °F (37 °C) 99 %      MAP       --          Physical Exam  Nursing Notes and Vital Signs Reviewed.  Constitutional: Patient is in no acute distress. Well-developed and well-nourished.  Head: Atraumatic. Normocephalic.  Eyes: EOM intact. Conjunctivae are not pale. No scleral icterus.  ENT: Mucous membranes are moist.   Neck: Supple. Full ROM.   Cardiovascular: Regular rate. Regular rhythm. No murmurs, rubs, or gallops.   Pulmonary/Chest: No respiratory distress. Clear to auscultation bilaterally. No wheezing or rales.  Abdominal: Soft and non-distended.  There is no tenderness.  No rebound, guarding, or rigidity.   Musculoskeletal: Moves all extremities. No obvious deformities. No edema.   Skin: Warm and dry.  Neurological:  Alert, awake, and appropriate.  Normal speech.  No acute focal neurological deficits are appreciated.  Psychiatric: Normal affect. Good eye contact. Appropriate in content.   : Copious blood in vaginal vault with blood clots. No tenderness and unable to palpate the cervix.     ED Course   Procedures  ED Vital Signs:  Vitals:    01/05/25 1335 01/05/25 1525 01/05/25 1530  "01/05/25 1540   BP: 119/78 (!) 135/91 131/89    Pulse: (!) 128 (!) 125 (!) 129 (!) 151   Resp: 18 18 18 18   Temp: 98.6 °F (37 °C)      SpO2: 99% 99% 99% 99%   Weight: 80.7 kg (177 lb 14.6 oz)      Height: 5' 7" (1.702 m)       01/05/25 1615 01/05/25 1700 01/05/25 1730 01/05/25 1800   BP: 122/79 120/76  (!) 127/90   Pulse: 110 99  106   Resp: 17 17 20 17   Temp:       SpO2: 100% 100%  100%   Weight:       Height:        01/05/25 1900 01/05/25 2000 01/05/25 2100 01/05/25 2142   BP: 120/81 117/80 128/79 128/79   Pulse: 102 109 95    Resp: 19 20 20    Temp:       SpO2: 100% 100% 100%    Weight:       Height:        01/05/25 2200 01/05/25 2300   BP: 125/79 104/64   Pulse: 97 92   Resp: 20 15   Temp:     SpO2: 100% 100%   Weight:     Height:         Abnormal Lab Results:  Labs Reviewed   URINALYSIS, REFLEX TO URINE CULTURE - Abnormal       Result Value    Specimen UA Urine, Clean Catch      Color, UA Red (*)     Appearance, UA Cloudy (*)     pH, UA 6.0      Specific Gravity, UA 1.025      Protein, UA 2+ (*)     Glucose, UA Negative      Ketones, UA Negative      Bilirubin (UA) Negative      Occult Blood UA 3+ (*)     Nitrite, UA Negative      Urobilinogen, UA Negative      Leukocytes, UA Negative      Narrative:     Specimen Source->Urine   URINALYSIS MICROSCOPIC - Abnormal    RBC, UA >100 (*)     WBC, UA 0      Bacteria Rare      Hyaline Casts, UA 0      Microscopic Comment SEE COMMENT      Narrative:     Specimen Source->Urine   CBC W/ AUTO DIFFERENTIAL - Abnormal    WBC 6.89      RBC 4.31      Hemoglobin 11.9 (*)     Hematocrit 37.0      MCV 86      MCH 27.6      MCHC 32.2      RDW 13.7      Platelets 204      MPV 11.0      Immature Granulocytes 0.1      Gran # (ANC) 3.9      Immature Grans (Abs) 0.01      Lymph # 2.5      Mono # 0.3      Eos # 0.1      Baso # 0.02      nRBC 0      Gran % 57.1      Lymph % 36.1      Mono % 4.4      Eosinophil % 2.0      Basophil % 0.3      Differential Method Automated     CBC W/ AUTO " DIFFERENTIAL    WBC 5.81      RBC 4.91      Hemoglobin 13.7      Hematocrit 42.4      MCV 86      MCH 27.9      MCHC 32.3      RDW 14.0      Platelets 240      MPV 10.8      Immature Granulocytes 0.3      Gran # (ANC) 3.5      Immature Grans (Abs) 0.02      Lymph # 1.8      Mono # 0.3      Eos # 0.2      Baso # 0.02      nRBC 0      Gran % 60.0      Lymph % 31.3      Mono % 5.0      Eosinophil % 3.1      Basophil % 0.3      Differential Method Automated     COMPREHENSIVE METABOLIC PANEL    Sodium 139      Potassium 3.9      Chloride 107      CO2 23      Glucose 101      BUN 10      Creatinine 0.9      Calcium 10.0      Total Protein 7.8      Albumin 4.1      Total Bilirubin 0.4      Alkaline Phosphatase 71      AST 19      ALT 24      eGFR >60      Anion Gap 9     PREGNANCY TEST, URINE RAPID    Preg Test, Ur Negative      Narrative:     Specimen Source->Urine   CBC W/ AUTO DIFFERENTIAL   TYPE & SCREEN    Group & Rh O POS      Indirect [place] NEG      Specimen Outdate 01/08/2025 23:59          All Lab Results:  Results for orders placed or performed during the hospital encounter of 01/05/25   Urinalysis, Reflex to Urine Culture Urine, Clean Catch    Collection Time: 01/05/25  2:06 PM    Specimen: Urine   Result Value Ref Range    Specimen UA Urine, Clean Catch     Color, UA Red (A) Yellow, Straw, Leanne    Appearance, UA Cloudy (A) Clear    pH, UA 6.0 5.0 - 8.0    Specific Gravity, UA 1.025 1.005 - 1.030    Protein, UA 2+ (A) Negative    Glucose, UA Negative Negative    Ketones, UA Negative Negative    Bilirubin (UA) Negative Negative    Occult Blood UA 3+ (A) Negative    Nitrite, UA Negative Negative    Urobilinogen, UA Negative <2.0 EU/dL    Leukocytes, UA Negative Negative   Pregnancy, urine rapid (UPT)    Collection Time: 01/05/25  2:06 PM   Result Value Ref Range    Preg Test, Ur Negative    Urinalysis Microscopic    Collection Time: 01/05/25  2:06 PM   Result Value Ref Range    RBC, UA >100 (H) 0 - 4 /hpf     WBC, UA 0 0 - 5 /hpf    Bacteria Rare None-Occ /hpf    Hyaline Casts, UA 0 0-1/lpf /lpf    Microscopic Comment SEE COMMENT    CBC auto differential    Collection Time: 01/05/25  2:30 PM   Result Value Ref Range    WBC 5.81 3.90 - 12.70 K/uL    RBC 4.91 4.00 - 5.40 M/uL    Hemoglobin 13.7 12.0 - 16.0 g/dL    Hematocrit 42.4 37.0 - 48.5 %    MCV 86 82 - 98 fL    MCH 27.9 27.0 - 31.0 pg    MCHC 32.3 32.0 - 36.0 g/dL    RDW 14.0 11.5 - 14.5 %    Platelets 240 150 - 450 K/uL    MPV 10.8 9.2 - 12.9 fL    Immature Granulocytes 0.3 0.0 - 0.5 %    Gran # (ANC) 3.5 1.8 - 7.7 K/uL    Immature Grans (Abs) 0.02 0.00 - 0.04 K/uL    Lymph # 1.8 1.0 - 4.8 K/uL    Mono # 0.3 0.3 - 1.0 K/uL    Eos # 0.2 0.0 - 0.5 K/uL    Baso # 0.02 0.00 - 0.20 K/uL    nRBC 0 0 /100 WBC    Gran % 60.0 38.0 - 73.0 %    Lymph % 31.3 18.0 - 48.0 %    Mono % 5.0 4.0 - 15.0 %    Eosinophil % 3.1 0.0 - 8.0 %    Basophil % 0.3 0.0 - 1.9 %    Differential Method Automated    Comprehensive metabolic panel    Collection Time: 01/05/25  2:30 PM   Result Value Ref Range    Sodium 139 136 - 145 mmol/L    Potassium 3.9 3.5 - 5.1 mmol/L    Chloride 107 95 - 110 mmol/L    CO2 23 23 - 29 mmol/L    Glucose 101 70 - 110 mg/dL    BUN 10 6 - 20 mg/dL    Creatinine 0.9 0.5 - 1.4 mg/dL    Calcium 10.0 8.7 - 10.5 mg/dL    Total Protein 7.8 6.0 - 8.4 g/dL    Albumin 4.1 3.5 - 5.2 g/dL    Total Bilirubin 0.4 0.1 - 1.0 mg/dL    Alkaline Phosphatase 71 40 - 150 U/L    AST 19 10 - 40 U/L    ALT 24 10 - 44 U/L    eGFR >60 >60 mL/min/1.73 m^2    Anion Gap 9 8 - 16 mmol/L   Type & Screen    Collection Time: 01/05/25  2:30 PM   Result Value Ref Range    Group & Rh O POS     Indirect [place] NEG     Specimen Outdate 01/08/2025 23:59    CBC Auto Differential    Collection Time: 01/05/25  6:52 PM   Result Value Ref Range    WBC 6.89 3.90 - 12.70 K/uL    RBC 4.31 4.00 - 5.40 M/uL    Hemoglobin 11.9 (L) 12.0 - 16.0 g/dL    Hematocrit 37.0 37.0 - 48.5 %    MCV 86 82 - 98 fL    MCH 27.6  27.0 - 31.0 pg    MCHC 32.2 32.0 - 36.0 g/dL    RDW 13.7 11.5 - 14.5 %    Platelets 204 150 - 450 K/uL    MPV 11.0 9.2 - 12.9 fL    Immature Granulocytes 0.1 0.0 - 0.5 %    Gran # (ANC) 3.9 1.8 - 7.7 K/uL    Immature Grans (Abs) 0.01 0.00 - 0.04 K/uL    Lymph # 2.5 1.0 - 4.8 K/uL    Mono # 0.3 0.3 - 1.0 K/uL    Eos # 0.1 0.0 - 0.5 K/uL    Baso # 0.02 0.00 - 0.20 K/uL    nRBC 0 0 /100 WBC    Gran % 57.1 38.0 - 73.0 %    Lymph % 36.1 18.0 - 48.0 %    Mono % 4.4 4.0 - 15.0 %    Eosinophil % 2.0 0.0 - 8.0 %    Basophil % 0.3 0.0 - 1.9 %    Differential Method Automated          Imaging Results:  Imaging Results               US Pelvis Complete Non OB (Final result)  Result time 25 17:03:31      Final result by Omar Yu MD (25 17:03:31)                   Impression:      Findings strongly concerning for retained products of conception.    This report was flagged in Epic as abnormal.      Electronically signed by: Omar Yu  Date:    2025  Time:    17:03               Narrative:    EXAMINATION:  US PELVIS COMPLETE NON OB    CLINICAL HISTORY:  Vaginal bleeding s/p  about 6 weeks ago.;    TECHNIQUE:  Transabdominal sonography of the pelvis was performed, followed by transvaginal sonography to better evaluate the uterus and ovaries.    COMPARISON:  None.    FINDINGS:  Uterus:    Size: 10.2 x 5.1 x 6.6 cm    Masses: None    Endometrium: Hypervascular in this pre menopausal patient, measuring 14 mm.    Right ovary:    Size: 3.7 x 2.0 x 2.1 cm    Appearance: Normal    Vascular flow: Normal.    Left ovary:    Size: 2.7 x 1.9 x 2.1 cm    Appearance: Normal    Vascular Flow: Normal.    Free Fluid:    None.                                                The Emergency Provider reviewed the vital signs and test results, which are outlined above.     ED Discussion     5:55 PM: Discussed case with Dr. Payne (OBYANELY). Dr. ORTIZ agrees with current care and management of pt and accepts admission.   Kerry also disagrees with US findings regarding the potential of retained products, however, Dr. Payne will still admit the pt for observation.  Admitting Service: OBGYN  Admitting Physician: Dr. Payne  Admit to: obs tele    5:59 PM: Re-evaluated pt. I have discussed test results, shared treatment plan, and the need for admission with patient. Pt expresses understanding at this time and agree with all information. All questions answered. Pt has no further questions or concerns at this time. Pt is ready for admit.      ED Course as of 01/06/25 0108   Sun Jan 05, 2025   1627 DDx includes retained products, menorrhagia, DUB [BA]      ED Course User Index  [BA] Zack Arenas MD     Medical Decision Making  Amount and/or Complexity of Data Reviewed  Labs: ordered. Decision-making details documented in ED Course.  Radiology: ordered. Decision-making details documented in ED Course.    Risk  Decision regarding hospitalization.                ED Medication(s):  Medications   sodium chloride 0.9% flush 10 mL (has no administration in time range)   ondansetron disintegrating tablet 8 mg (has no administration in time range)   NIFEdipine 24 hr tablet 60 mg (60 mg Oral Given 1/5/25 2142)   ketorolac injection 30 mg (30 mg Intravenous Given 1/6/25 0040)   sodium chloride 0.9% bolus 1,000 mL 1,000 mL (0 mLs Intravenous Stopped 1/5/25 1726)   tranexamic acid in NaCl,iso-os IVPB 1,000 mg (0 mg Intravenous Stopped 1/5/25 1913)   miSOPROStoL tablet 800 mcg (800 mcg Oral Given 1/5/25 1855)   ketorolac injection 30 mg (30 mg Intravenous Given 1/5/25 1853)       New Prescriptions    No medications on file               Scribe Attestation:   Scribe #1: I performed the above scribed service and the documentation accurately describes the services I performed. I attest to the accuracy of the note.     Attending:   Physician Attestation Statement for Scribe #1: I, Zack Arenas MD, personally performed the services described in this  documentation, as scribed by Herrera Clark, in my presence, and it is both accurate and complete.           Clinical Impression       ICD-10-CM ICD-9-CM   1. Postpartum hemorrhage, unspecified type  O72.1 666.10   2. Vaginal bleeding  N93.9 623.8   3. Postpartum hemorrhage, delayed (> 24 hrs), postpartum condition  O72.2 666.20       Disposition:   Disposition: Placed in Observation  Condition: Zack Albert MD  01/06/25 0108

## 2025-01-06 VITALS
HEART RATE: 90 BPM | HEIGHT: 67 IN | WEIGHT: 177.94 LBS | SYSTOLIC BLOOD PRESSURE: 113 MMHG | TEMPERATURE: 98 F | DIASTOLIC BLOOD PRESSURE: 82 MMHG | BODY MASS INDEX: 27.93 KG/M2 | RESPIRATION RATE: 16 BRPM | OXYGEN SATURATION: 100 %

## 2025-01-06 LAB
BASOPHILS # BLD AUTO: 0.02 K/UL (ref 0–0.2)
BASOPHILS # BLD AUTO: 0.04 K/UL (ref 0–0.2)
BASOPHILS NFR BLD: 0.4 % (ref 0–1.9)
BASOPHILS NFR BLD: 0.6 % (ref 0–1.9)
DIFFERENTIAL METHOD BLD: ABNORMAL
DIFFERENTIAL METHOD BLD: ABNORMAL
EOSINOPHIL # BLD AUTO: 0.2 K/UL (ref 0–0.5)
EOSINOPHIL # BLD AUTO: 0.3 K/UL (ref 0–0.5)
EOSINOPHIL NFR BLD: 3.4 % (ref 0–8)
EOSINOPHIL NFR BLD: 3.8 % (ref 0–8)
ERYTHROCYTE [DISTWIDTH] IN BLOOD BY AUTOMATED COUNT: 14 % (ref 11.5–14.5)
ERYTHROCYTE [DISTWIDTH] IN BLOOD BY AUTOMATED COUNT: 14.2 % (ref 11.5–14.5)
HCT VFR BLD AUTO: 34.9 % (ref 37–48.5)
HCT VFR BLD AUTO: 35.9 % (ref 37–48.5)
HGB BLD-MCNC: 11.3 G/DL (ref 12–16)
HGB BLD-MCNC: 11.5 G/DL (ref 12–16)
IMM GRANULOCYTES # BLD AUTO: 0.01 K/UL (ref 0–0.04)
IMM GRANULOCYTES # BLD AUTO: 0.02 K/UL (ref 0–0.04)
IMM GRANULOCYTES NFR BLD AUTO: 0.2 % (ref 0–0.5)
IMM GRANULOCYTES NFR BLD AUTO: 0.3 % (ref 0–0.5)
LYMPHOCYTES # BLD AUTO: 2.1 K/UL (ref 1–4.8)
LYMPHOCYTES # BLD AUTO: 2.9 K/UL (ref 1–4.8)
LYMPHOCYTES NFR BLD: 41.3 % (ref 18–48)
LYMPHOCYTES NFR BLD: 42.8 % (ref 18–48)
MCH RBC QN AUTO: 27.3 PG (ref 27–31)
MCH RBC QN AUTO: 27.6 PG (ref 27–31)
MCHC RBC AUTO-ENTMCNC: 32 G/DL (ref 32–36)
MCHC RBC AUTO-ENTMCNC: 32.4 G/DL (ref 32–36)
MCV RBC AUTO: 85 FL (ref 82–98)
MCV RBC AUTO: 85 FL (ref 82–98)
MONOCYTES # BLD AUTO: 0.3 K/UL (ref 0.3–1)
MONOCYTES # BLD AUTO: 0.5 K/UL (ref 0.3–1)
MONOCYTES NFR BLD: 5.8 % (ref 4–15)
MONOCYTES NFR BLD: 6.5 % (ref 4–15)
NEUTROPHILS # BLD AUTO: 2.4 K/UL (ref 1.8–7.7)
NEUTROPHILS # BLD AUTO: 3.3 K/UL (ref 1.8–7.7)
NEUTROPHILS NFR BLD: 47.4 % (ref 38–73)
NEUTROPHILS NFR BLD: 47.5 % (ref 38–73)
NRBC BLD-RTO: 0 /100 WBC
NRBC BLD-RTO: 0 /100 WBC
OHS QRS DURATION: 78 MS
OHS QTC CALCULATION: 433 MS
PLATELET # BLD AUTO: 182 K/UL (ref 150–450)
PLATELET # BLD AUTO: 192 K/UL (ref 150–450)
PMV BLD AUTO: 10.7 FL (ref 9.2–12.9)
PMV BLD AUTO: 10.9 FL (ref 9.2–12.9)
RBC # BLD AUTO: 4.1 M/UL (ref 4–5.4)
RBC # BLD AUTO: 4.22 M/UL (ref 4–5.4)
WBC # BLD AUTO: 5 K/UL (ref 3.9–12.7)
WBC # BLD AUTO: 7.04 K/UL (ref 3.9–12.7)

## 2025-01-06 PROCEDURE — 63600175 PHARM REV CODE 636 W HCPCS: Performed by: OBSTETRICS & GYNECOLOGY

## 2025-01-06 PROCEDURE — 96376 TX/PRO/DX INJ SAME DRUG ADON: CPT

## 2025-01-06 PROCEDURE — G0378 HOSPITAL OBSERVATION PER HR: HCPCS

## 2025-01-06 PROCEDURE — 25000003 PHARM REV CODE 250: Performed by: OBSTETRICS & GYNECOLOGY

## 2025-01-06 PROCEDURE — 36415 COLL VENOUS BLD VENIPUNCTURE: CPT | Performed by: OBSTETRICS & GYNECOLOGY

## 2025-01-06 PROCEDURE — 85025 COMPLETE CBC W/AUTO DIFF WBC: CPT | Performed by: OBSTETRICS & GYNECOLOGY

## 2025-01-06 PROCEDURE — 85025 COMPLETE CBC W/AUTO DIFF WBC: CPT | Mod: 91 | Performed by: STUDENT IN AN ORGANIZED HEALTH CARE EDUCATION/TRAINING PROGRAM

## 2025-01-06 PROCEDURE — 99232 SBSQ HOSP IP/OBS MODERATE 35: CPT | Mod: 24,,, | Performed by: STUDENT IN AN ORGANIZED HEALTH CARE EDUCATION/TRAINING PROGRAM

## 2025-01-06 PROCEDURE — 36415 COLL VENOUS BLD VENIPUNCTURE: CPT | Performed by: STUDENT IN AN ORGANIZED HEALTH CARE EDUCATION/TRAINING PROGRAM

## 2025-01-06 RX ADMIN — KETOROLAC TROMETHAMINE 30 MG: 30 INJECTION, SOLUTION INTRAMUSCULAR at 11:01

## 2025-01-06 RX ADMIN — KETOROLAC TROMETHAMINE 30 MG: 30 INJECTION, SOLUTION INTRAMUSCULAR at 12:01

## 2025-01-06 RX ADMIN — NIFEDIPINE 60 MG: 30 TABLET, FILM COATED, EXTENDED RELEASE ORAL at 08:01

## 2025-01-06 NOTE — PROGRESS NOTES
[facility])  Obstetrics & Gynecology  Progress Note    Patient Name: Maria De Jesus Payne  MRN: [id number]  Admission Date: 2025  Primary Care Provider: No, Primary Doctor  Principal Problem: Postpartum hemorrhage, delayed (> 24 hrs), postpartum condition    Subjective:     HPI:  19 y.o. female  s/p  on 24 at 34 weeks, due to severe preeclampsia.  She reports that her postpartum bleeding had completely stopped, but she started having heavy bleeding yesterday, which has persisted.  This morning she passed several large blood clots and has continued to have heavy bleeding today, requiring pad changes every 1-2 hours.  She is also having pelvic cramping.  No weakness or dizziness.  No other complaints.      Interval History: Admission for postpartum vaginal bleeding. Bleeding stable at this time. 1/4 pad full over 1.5 hours. She specfically denies fevers, chills, headaches, nausea, vomiting, shortness of breath, chest pain, abdominal pain, abnormal vaginal discharge, bowel or bladder changes, leg swelling.     Scheduled Meds:   ketorolac  30 mg Intravenous Q6H    NIFEdipine  60 mg Oral BID     Continuous Infusions:  PRN Meds:  Current Facility-Administered Medications:     ondansetron, 8 mg, Oral, Q8H PRN    sodium chloride 0.9%, 10 mL, Intravenous, PRN    Review of patient's allergies indicates:  No Known Allergies    Objective:     Vital Signs (Most Recent):  Temp: 97.7 °F (36.5 °C) (25 0746)  Pulse: 87 (25 07)  Resp: 16 (25)  BP: 112/65 (25 0746)  SpO2: 98 % (25 0209) Vital Signs (24h Range):  Temp:  [97.7 °F (36.5 °C)-98.6 °F (37 °C)] 97.7 °F (36.5 °C)  Pulse:  [] 87  Resp:  [15-22] 16  SpO2:  [98 %-100 %] 98 %  BP: (104-135)/(64-91) 112/65     Weight: 80.7 kg (177 lb 14.6 oz)  Body mass index is 27.86 kg/m².  Patient's last menstrual period was 2024.    I&O (Last 24H):    Intake/Output Summary (Last 24 hours) at 2025  1027  Last data filed at 1/5/2025 1913  Gross per 24 hour   Intake 1086.78 ml   Output --   Net 1086.78 ml         Laboratory:  Recent Lab Results         01/06/25  0435   01/05/25  1852   01/05/25  1430   01/05/25  1406        Albumin     4.1         ALP     71         ALT     24         Anion Gap     9         Appearance, UA       Cloudy       AST     19         Bacteria, UA       Rare       Baso # 0.04   0.02   0.02         Basophil % 0.6   0.3   0.3         Bilirubin (UA)       Negative       BILIRUBIN TOTAL     0.4  Comment: For infants and newborns, interpretation of results should be based  on gestational age, weight and in agreement with clinical  observations.    Premature Infant recommended reference ranges:  Up to 24 hours.............<8.0 mg/dL  Up to 48 hours............<12.0 mg/dL  3-5 days..................<15.0 mg/dL  6-29 days.................<15.0 mg/dL           BUN     10         Calcium     10.0         Chloride     107         CO2     23         Color, UA       Red       Creatinine     0.9         Differential Method Automated   Automated   Automated         eGFR     >60         Eos # 0.3   0.1   0.2         Eos % 3.8   2.0   3.1         Glucose     101         Glucose, UA       Negative       Gran # (ANC) 3.3   3.9   3.5         Gran % 47.5   57.1   60.0         Group & Rh     O POS         Hematocrit 34.9   37.0   42.4         Hemoglobin 11.3   11.9   13.7         Hyaline Casts, UA       0       Immature Grans (Abs) 0.02  Comment: Mild elevation in immature granulocytes is non specific and   can be seen in a variety of conditions including stress response,   acute inflammation, trauma and pregnancy. Correlation with other   laboratory and clinical findings is essential.     0.01  Comment: Mild elevation in immature granulocytes is non specific and   can be seen in a variety of conditions including stress response,   acute inflammation, trauma and pregnancy. Correlation with other   laboratory  and clinical findings is essential.     0.02  Comment: Mild elevation in immature granulocytes is non specific and   can be seen in a variety of conditions including stress response,   acute inflammation, trauma and pregnancy. Correlation with other   laboratory and clinical findings is essential.           Immature Granulocytes 0.3   0.1   0.3         INDIRECT MARELY     NEG         Ketones, UA       Negative       Leukocyte Esterase, UA       Negative       Lymph # 2.9   2.5   1.8         Lymph % 41.3   36.1   31.3         MCH 27.6   27.6   27.9         MCHC 32.4   32.2   32.3         MCV 85   86   86         Microscopic Comment       SEE COMMENT  Comment: Other formed elements not mentioned in the report are not   present in the microscopic examination.          Mono # 0.5   0.3   0.3         Mono % 6.5   4.4   5.0         MPV 10.9   11.0   10.8         NITRITE UA       Negative       nRBC 0   0   0         Blood, UA       3+       pH, UA       6.0       Platelet Count 192   204   240         Potassium     3.9         hCG Qualitative, Urine       Negative       PROTEIN TOTAL     7.8         Protein, UA       2+  Comment: Recommend a 24 hour urine protein or a urine   protein/creatinine ratio if globulin induced proteinuria is  clinically suspected.         RBC 4.10   4.31   4.91         RBC, UA       >100       RDW 14.0   13.7   14.0         Sodium     139         Spec Grav UA       1.025       Specimen Outdate     01/08/2025 23:59         Specimen UA       Urine, Clean Catch       UROBILINOGEN UA       Negative       WBC, UA       0       WBC 7.04   6.89   5.81               I have personallly reviewed all pertinent lab results from the last 24 hours.    Diagnostic Results:  US: Reviewed       Physical Exam:   Constitutional: She is oriented to person, place, and time. She appears well-developed and well-nourished. No distress.       Cardiovascular:  Normal rate, regular rhythm and normal heart sounds.              Pulmonary/Chest: Effort normal and breath sounds normal.        Abdominal: Soft. Bowel sounds are normal. She exhibits no distension. There is no abdominal tenderness.     Genitourinary:    Genitourinary Comments: No active vaginal bleeding. Last pad changed about 1.5 hours ago and is currently 1/4 full.             Musculoskeletal: Normal range of motion and moves all extremeties. No tenderness or edema.       Neurological: She is alert and oriented to person, place, and time.    Skin: Skin is warm and dry.    Psychiatric: She has a normal mood and affect. Her behavior is normal. Thought content normal.        Review of Systems   Constitutional:  Negative for chills, diaphoresis, fatigue and fever.   Respiratory:  Negative for shortness of breath.    Cardiovascular:  Negative for chest pain.   Gastrointestinal:  Negative for abdominal pain, constipation, diarrhea, nausea and vomiting.   Genitourinary:  Positive for vaginal bleeding. Negative for flank pain and pelvic pain.   Musculoskeletal:  Negative for back pain.       Assessment/Plan:     * Postpartum hemorrhage, delayed (> 24 hrs), postpartum condition  Admit patient for observation, due to heavy vaginal bleeding and tachycardia.  H/H currently stable.  Will monitor.  Pelvic u/s - 14 mm endometrium with hypervascularity.  Possible retained placental fragment?  TXA 1 gram IV ordered.  Cytotec 800 mcg po x 1 dose.  Discussed plan of care with patient and advised of possible need for D&C if heavy bleeding is persistent, currently no heavy bleeding, will provide regular diet this AM     (normal spontaneous vaginal delivery)  S/P  on .    Severe pre-eclampsia in third trimester  Blood pressures currently well controlled on procardia XL 60 mg BID        Yanet Martines MD  Obstetrics & Gynecology  [facility] ([facility])

## 2025-01-06 NOTE — HOSPITAL COURSE
Admit for observation, due to delayed PP hemorrhage.  Pelvic u/s - 14 mm endometrium with some hypervascularity.  ? Retained placental fragment  Cytotec 800 mcg po x 1 dose  TXA 1 gram IV  Serial H/H    1/6/25: Bleeding decreasing, Hemoglboin 11.9 on admission, down to 11.3 today, vital signs stable, will continue to monitor for bleeding. Bleeding stable, filling pads over 4 hours. Repeat CBC stable. Appropriate for discharge home with return precautions.

## 2025-01-06 NOTE — ASSESSMENT & PLAN NOTE
Admit patient for observation, due to heavy vaginal bleeding and tachycardia.  H/H currently stable.  Will monitor.  Pelvic u/s - 14 mm endometrium with hypervascularity.  Possible retained placental fragment?  TXA 1 gram IV ordered.  Cytotec 800 mcg po x 1 dose.  Discussed plan of care with patient and advised of possible need for D&C if heavy bleeding is persistent.

## 2025-01-06 NOTE — ASSESSMENT & PLAN NOTE
Admit patient for observation, due to heavy vaginal bleeding and tachycardia.  H/H currently stable.  Will monitor.  Pelvic u/s - 14 mm endometrium with hypervascularity.  Possible retained placental fragment?  TXA 1 gram IV ordered.  Cytotec 800 mcg po x 1 dose.  Discussed plan of care with patient and advised of possible need for D&C if heavy bleeding is persistent, currently no heavy bleeding, will provide regular diet this AM

## 2025-01-06 NOTE — SUBJECTIVE & OBJECTIVE
OB History    Para Term  AB Living   1 1 0 1 0 1   SAB IAB Ectopic Multiple Live Births   0 0 0 0 1      # Outcome Date GA Lbr Alex/2nd Weight Sex Type Anes PTL Lv   1  24 34w1d 43:35 / 00:11 1.84 kg (4 lb 0.9 oz) M Vag-Spont EPI Y MCKENNA      Name: Danilo Pelletier      Apgar1: 8  Apgar5: 9     Past Medical History:   Diagnosis Date    ASCUS of cervix with negative high risk HPV 04/15/2024    Repeat pap in 6 months, 10/22/24    Irregular menstrual cycle     Labial cyst     lanced     History reviewed. No pertinent surgical history.    (Not in a hospital admission)      Review of patient's allergies indicates:  No Known Allergies     Family History       Problem Relation (Age of Onset)    Diabetes Father    No Known Problems Mother          Tobacco Use    Smoking status: Never     Passive exposure: Never    Smokeless tobacco: Never   Substance and Sexual Activity    Alcohol use: Never    Drug use: Not Currently    Sexual activity: Yes     Partners: Male     Review of Systems   Constitutional:  Negative for chills and fever.   Respiratory:  Negative for cough and shortness of breath.    Cardiovascular:  Negative for chest pain.   Gastrointestinal:  Negative for abdominal pain, nausea and vomiting.   Genitourinary:  Positive for menorrhagia, pelvic pain and vaginal bleeding. Negative for dysuria.      Objective:     Vital Signs (Most Recent):  Temp: 98.6 °F (37 °C) (25 1335)  Pulse: 110 (25 1615)  Resp: 17 (25 1615)  BP: 122/79 (25 1615)  SpO2: 100 % (25 1615) Vital Signs (24h Range):  Temp:  [98.6 °F (37 °C)] 98.6 °F (37 °C)  Pulse:  [110-151] 110  Resp:  [17-18] 17  SpO2:  [99 %-100 %] 100 %  BP: (119-135)/(78-91) 122/79     Weight: 80.7 kg (177 lb 14.6 oz)  Body mass index is 27.86 kg/m².    Patient's last menstrual period was 2024.     Physical Exam:   Constitutional: She is oriented to person, place, and time. She appears well-developed and  well-nourished. No distress.    HENT:   Head: Normocephalic and atraumatic.      Cardiovascular:  Normal rate and regular rhythm.             Pulmonary/Chest: Effort normal.        Abdominal: Soft. She exhibits no distension. There is no abdominal tenderness.     Genitourinary:    Genitourinary Comments: Active, heavy bleeding per pelvic exam by ED provider.  No tenderness or masses.             Musculoskeletal: No tenderness or edema.       Neurological: She is alert and oriented to person, place, and time.    Skin: Skin is warm and dry.    Psychiatric: She has a normal mood and affect.        Laboratory:  CBC:   Recent Labs   Lab 01/05/25  1430   WBC 5.81   RBC 4.91   HGB 13.7   HCT 42.4      MCV 86   MCH 27.9   MCHC 32.3     CMP:   Recent Labs   Lab 01/05/25  1430      CALCIUM 10.0   ALBUMIN 4.1   PROT 7.8      K 3.9   CO2 23      BUN 10   CREATININE 0.9   ALKPHOS 71   ALT 24   AST 19   BILITOT 0.4     UPT negative    Diagnostic Results:    Pelvic u/s - 14 mm endometrium with hypervascularity.  Possible retained placental tissue.

## 2025-01-06 NOTE — PHARMACY MED REC
"Admission Medication History     The home medication history was taken by Jasper Gross.    You may go to "Admission" then "Reconcile Home Medications" tabs to review and/or act upon these items.     The home medication list has been updated by the Pharmacy department.   Please read ALL comments highlighted in yellow.   Please address this information as you see fit.    Feel free to contact us if you have any questions or require assistance.      Medications listed below were obtained from: Patient/family, Analytic software- [employer], and Medical records  (Not in a hospital admission)    Jasper Gross  [id number]    Current Outpatient Medications on File Prior to Encounter   Medication Sig Dispense Refill Last Dose/Taking    ibuprofen (ADVIL,MOTRIN) 600 MG tablet Take 1 tablet (600 mg total) by mouth every 6 (six) hours as needed for Pain. 60 tablet 0     methocarbamoL (ROBAXIN) 500 MG Tab Take 1 tablet (500 mg total) by mouth 4 (four) times daily. for 10 days 40 tablet 0     NIFEdipine (PROCARDIA-XL) 60 MG (OSM) 24 hr tablet Take 1 tablet (60 mg total) by mouth 2 (two) times daily. 60 tablet 2      .          "

## 2025-01-06 NOTE — HPI
19 y.o. female  s/p  on 24 at 34 weeks, due to severe preeclampsia.  She reports that her postpartum bleeding had completely stopped, but she started having heavy bleeding yesterday, which has persisted.  This morning she passed several large blood clots and has continued to have heavy bleeding today, requiring pad changes every 1-2 hours.  She is also having pelvic cramping.  No weakness or dizziness.  No other complaints.

## 2025-01-06 NOTE — H&P
[facility]  Obstetrics & Gynecology  History & Physical    Patient Name: Maria De Jesus Payne  MRN: [id number]  Admission Date: 2025  Primary Care Provider: No, Primary Doctor    Subjective:     Chief Complaint/Reason for Admission: Postpartum vaginal bleeding    History of Present Illness:  19 y.o. female  s/p  on 24 at 34 weeks, due to severe preeclampsia.  She reports that her postpartum bleeding had completely stopped, but she started having heavy bleeding yesterday, which has persisted.  This morning she passed several large blood clots and has continued to have heavy bleeding today, requiring pad changes every 1-2 hours.  She is also having pelvic cramping.  No weakness or dizziness.  No other complaints.          OB History    Para Term  AB Living   1 1 0 1 0 1   SAB IAB Ectopic Multiple Live Births   0 0 0 0 1      # Outcome Date GA Lbr Alex/2nd Weight Sex Type Anes PTL Lv   1  24 34w1d 43:35 / 00:11 1.84 kg (4 lb 0.9 oz) M Vag-Spont EPI Y MCKENNA      Name: Danilo Pelletier      Apgar1: 8  Apgar5: 9     Past Medical History:   Diagnosis Date    ASCUS of cervix with negative high risk HPV 04/15/2024    Repeat pap in 6 months, 10/22/24    Irregular menstrual cycle     Labial cyst     lanced     History reviewed. No pertinent surgical history.    (Not in a hospital admission)      Review of patient's allergies indicates:  No Known Allergies     Family History       Problem Relation (Age of Onset)    Diabetes Father    No Known Problems Mother          Tobacco Use    Smoking status: Never     Passive exposure: Never    Smokeless tobacco: Never   Substance and Sexual Activity    Alcohol use: Never    Drug use: Not Currently    Sexual activity: Yes     Partners: Male     Review of Systems   Constitutional:  Negative for chills and fever.   Respiratory:  Negative for cough and shortness of breath.    Cardiovascular:  Negative for chest pain.    Gastrointestinal:  Negative for abdominal pain, nausea and vomiting.   Genitourinary:  Positive for menorrhagia, pelvic pain and vaginal bleeding. Negative for dysuria.      Objective:     Vital Signs (Most Recent):  Temp: 98.6 °F (37 °C) (01/05/25 1335)  Pulse: 110 (01/05/25 1615)  Resp: 17 (01/05/25 1615)  BP: 122/79 (01/05/25 1615)  SpO2: 100 % (01/05/25 1615) Vital Signs (24h Range):  Temp:  [98.6 °F (37 °C)] 98.6 °F (37 °C)  Pulse:  [110-151] 110  Resp:  [17-18] 17  SpO2:  [99 %-100 %] 100 %  BP: (119-135)/(78-91) 122/79     Weight: 80.7 kg (177 lb 14.6 oz)  Body mass index is 27.86 kg/m².    Patient's last menstrual period was 04/04/2024.     Physical Exam:   Constitutional: She is oriented to person, place, and time. She appears well-developed and well-nourished. No distress.    HENT:   Head: Normocephalic and atraumatic.      Cardiovascular:  Normal rate and regular rhythm.             Pulmonary/Chest: Effort normal.        Abdominal: Soft. She exhibits no distension. There is no abdominal tenderness.     Genitourinary:    Genitourinary Comments: Active, heavy bleeding per pelvic exam by ED provider.  No tenderness or masses.             Musculoskeletal: No tenderness or edema.       Neurological: She is alert and oriented to person, place, and time.    Skin: Skin is warm and dry.    Psychiatric: She has a normal mood and affect.        Laboratory:  CBC:   Recent Labs   Lab 01/05/25  1430   WBC 5.81   RBC 4.91   HGB 13.7   HCT 42.4      MCV 86   MCH 27.9   MCHC 32.3     CMP:   Recent Labs   Lab 01/05/25  1430      CALCIUM 10.0   ALBUMIN 4.1   PROT 7.8      K 3.9   CO2 23      BUN 10   CREATININE 0.9   ALKPHOS 71   ALT 24   AST 19   BILITOT 0.4     UPT negative    Diagnostic Results:    Pelvic u/s - 14 mm endometrium with hypervascularity.  Possible retained placental tissue.  Assessment/Plan:     Obstetric  * Postpartum hemorrhage, delayed (> 24 hrs), postpartum condition  Admit  patient for observation, due to heavy vaginal bleeding and tachycardia.  H/H currently stable.  Will monitor.  Pelvic u/s - 14 mm endometrium with hypervascularity.  Possible retained placental fragment?  TXA 1 gram IV ordered.  Cytotec 800 mcg po x 1 dose.  Discussed plan of care with patient and advised of possible need for D&C if heavy bleeding is persistent.        Sunni Payne MD  Obstetrics & Gynecology  [facility] - Emergency Dept.

## 2025-01-07 ENCOUNTER — POSTPARTUM VISIT (OUTPATIENT)
Dept: OBSTETRICS AND GYNECOLOGY | Facility: CLINIC | Age: 20
End: 2025-01-07
Payer: MEDICAID

## 2025-01-07 VITALS
WEIGHT: 173.94 LBS | SYSTOLIC BLOOD PRESSURE: 115 MMHG | BODY MASS INDEX: 27.3 KG/M2 | DIASTOLIC BLOOD PRESSURE: 70 MMHG | HEIGHT: 67 IN

## 2025-01-07 PROBLEM — O36.5930 POOR FETAL GROWTH AFFECTING MANAGEMENT OF MOTHER IN THIRD TRIMESTER: Status: RESOLVED | Noted: 2024-11-13 | Resolved: 2025-01-07

## 2025-01-07 PROCEDURE — 99213 OFFICE O/P EST LOW 20 MIN: CPT | Mod: PBBFAC | Performed by: ADVANCED PRACTICE MIDWIFE

## 2025-01-07 PROCEDURE — 99999 PR PBB SHADOW E&M-EST. PATIENT-LVL III: CPT | Mod: PBBFAC,,, | Performed by: ADVANCED PRACTICE MIDWIFE

## 2025-01-07 NOTE — PLAN OF CARE
"Mother Self Care:    Activity: Avoid strenuous exercise and get adequate rest.  No driving until the physician consent given.  Emotional Changes: Most women find birth to be a time of great emotional upheaval.  Sense of loss, mood swings, fatigue, anxiety, and feeling "let down" are common.  If feelings worsen or last more than a week, call your physician.  Sexual Activity/Pelvic Rest: No sexual activity, tampons, or douching until your physician gives you consent.  Diet: Continue to eat from the five basic food groups, including plenty of protein, fruits, vegetables, and whole grains.  Limit empty calories and high fat foods.  Drink enough fluids to satisfy thirst and add an extra 500 calories for breastfeeding.    "What does help look like to you when you go home?"  "Is there any need that you anticipate that we may be able to assist with?"    CALL YOUR OB DOCTOR IF ANY OF THE FOLLOWING OCCURS:  *Heavy bleeding - saturating a pad an hour or passing any large (2-3 inches in size) blood clots.  *Any pain, redness, or tenderness in lower leg.  *You cannot care for yourself or your baby.  *Any signs of infection-      - Temperature greater than 100.5 degrees F      - Foul smelling vaginal discharge and/or excessive vaginal bleeding       -       - Any urgency, frequency or burning with urination    Return To the Hospital for further Evaluation:  Headache not relieved by tylenol or ibuprofen  Blurry vision, double vision, seeing spots, or flashing lights  Feeling faint or passing out  Right epigastric pain  Difficulty breathing  Swelling in hands, face, or feet  Any of these symptoms accompanied by nausea/vomiting  Gaining more than 5 pounds in one week  Seizures  These symptoms could be an indication of elevated blood pressure.     For patients that were treated for high blood pressure during pregnancy and or your hospital stay you will need a blood pressure check three days after you leave the hospital. Your nursing " staff will assist you in an appointment if needed. If you have Connected Mom you may use your blood pressure cuff and report any readings 140/90 to your provider immediately.     If you have any questions that need to be answered immediately please call the Labor & Delivery Unit at [phone] and ask to speak to a nurse.    Please see [facility] folder for additional information and handouts.       Problem: Adult Inpatient Plan of Care  Goal: Plan of Care Review  Outcome: Met  Goal: Patient-Specific Goal (Individualized)  Outcome: Met  Goal: Absence of Hospital-Acquired Illness or Injury  Outcome: Met  Goal: Optimal Comfort and Wellbeing  Outcome: Met  Goal: Readiness for Transition of Care  Outcome: Met

## 2025-01-07 NOTE — DISCHARGE SUMMARY
[facility] ([facility])  Obstetrics & Gynecology  Discharge Summary    Patient Name: Maria De Jesus Payne  MRN: [id number]  Admission Date: 2025  Hospital Length of Stay: 0 days  Discharge Date and Time:  2025 7:25 PM  Attending Physician: Sunni Payne,*   Discharging Provider: Yanet Martines MD  Primary Care Provider: Abena, Primary Doctor    HPI:  19 y.o. female  s/p  on 24 at 34 weeks, due to severe preeclampsia.  She reports that her postpartum bleeding had completely stopped, but she started having heavy bleeding yesterday, which has persisted.  This morning she passed several large blood clots and has continued to have heavy bleeding today, requiring pad changes every 1-2 hours.  She is also having pelvic cramping.  No weakness or dizziness.  No other complaints.      Hospital Course:  Admit for observation, due to delayed PP hemorrhage.  Pelvic u/s - 14 mm endometrium with some hypervascularity.  ? Retained placental fragment  Cytotec 800 mcg po x 1 dose  TXA 1 gram IV  Serial H/H    25: Bleeding decreasing, Hemoglboin 11.9 on admission, down to 11.3 today, vital signs stable, will continue to monitor for bleeding. Bleeding stable, filling pads over 4 hours. Repeat CBC stable. Appropriate for discharge home with return precautions.    Goals of Care Treatment Preferences:  Code Status: Full Code      * No surgery found *           Pending Diagnostic Studies:       None          Final Active Diagnoses:    Diagnosis Date Noted POA    PRINCIPAL PROBLEM:  Postpartum hemorrhage, delayed (> 24 hrs), postpartum condition [O72.2] 2025 Yes     (normal spontaneous vaginal delivery) [O80] 2024 Not Applicable    Severe pre-eclampsia in third trimester [O14.13] 10/17/2024 Yes      Problems Resolved During this Admission:        Discharged Condition: stable    Disposition: Home or Self Care    Follow Up:    Patient Instructions:      Diet Adult Regular     Pelvic  Rest     Notify your health care provider if you experience any of the following:  temperature >100.4     Notify your health care provider if you experience any of the following:  increased confusion or weakness     Notify your health care provider if you experience any of the following:  worsening rash     Notify your health care provider if you experience any of the following:  severe persistent headache     Notify your health care provider if you experience any of the following:  redness, tenderness, or signs of infection (pain, swelling, redness, odor or green/yellow discharge around incision site)     Notify your health care provider if you experience any of the following:  severe uncontrolled pain     Notify your health care provider if you experience any of the following:  persistent nausea and vomiting or diarrhea     Notify your health care provider if you experience any of the following:  difficulty breathing or increased cough     Notify your health care provider if you experience any of the following:  persistent dizziness, light-headedness, or visual disturbances     Notify your health care provider if you experience any of the following:   Order Comments: Filling menstrual pad in 1 hour for 2 hours in a row.     Activity as tolerated     Medications:  Reconciled Home Medications:      Medication List        CONTINUE taking these medications      ibuprofen 600 MG tablet  Commonly known as: ADVIL,MOTRIN  Take 1 tablet (600 mg total) by mouth every 6 (six) hours as needed for Pain.     methocarbamoL 500 MG Tab  Commonly known as: ROBAXIN  Take 1 tablet (500 mg total) by mouth 4 (four) times daily. for 10 days     NIFEdipine 60 MG (OSM) 24 hr tablet  Commonly known as: PROCARDIA-XL  Take 1 tablet (60 mg total) by mouth 2 (two) times daily.              Yanet Martines MD  Obstetrics & Gynecology  [facility])

## 2025-01-07 NOTE — PROGRESS NOTES
"Subjective:       Maria De Jesus Payne is a 19 y.o. female who presents for a postpartum visit. She is 5 weeks postpartum following a spontaneous vaginal delivery. I have fully reviewed the prenatal and intrapartum course. The delivery was at 34.1 gestational weeks. Outcome: spontaneous vaginal delivery. Anesthesia: epidural. Postpartum course has been passage of clots with being readmitted to hospital. Bleeding is now moderate (like a moderate period). Changing pads every hour, passing random dime size clots. Baby's course has been routine since d/c form NICU. Baby is feeding by bottle - Similac Neosure. Bleeding red, clots, and changing a maxi pad every 1 hours. Bowel function is normal. Bladder function is normal. Patient is sexually active. Contraception method is none. Postpartum depression screening: negative.    The following portions of the patient's history were reviewed and updated as appropriate: allergies, current medications, past family history, past medical history, past social history, past surgical history, and problem list.    Review of Systems  Pertinent items are noted in HPI.     Objective:      /70   Ht 5' 7" (1.702 m)   Wt 78.9 kg (173 lb 15.1 oz)   LMP 04/04/2024   Breastfeeding No   BMI 27.24 kg/m²    General:  alert, appears stated age, and cooperative    Breasts:  inspection negative, no nipple discharge or bleeding, no masses or nodularity palpable   Lungs: clear to auscultation bilaterally   Heart:  regular rate and rhythm, S1, S2 normal, no murmur, click, rub or gallop   Abdomen: soft, non-tender; bowel sounds normal; no masses,  no organomegaly   Declines pelvic                               Assessment:      Routine postpartum exam. Pap smear not done at today's visit.     Plan:      1. Contraception: none  2. Wien down to 1 procardia 60XL daily. BP check in one week.       "

## 2025-01-16 ENCOUNTER — POSTPARTUM VISIT (OUTPATIENT)
Dept: OBSTETRICS AND GYNECOLOGY | Facility: CLINIC | Age: 20
End: 2025-01-16
Payer: MEDICAID

## 2025-01-16 VITALS
DIASTOLIC BLOOD PRESSURE: 64 MMHG | SYSTOLIC BLOOD PRESSURE: 116 MMHG | HEIGHT: 67 IN | BODY MASS INDEX: 27.48 KG/M2 | WEIGHT: 175.06 LBS

## 2025-01-16 DIAGNOSIS — O14.13 SEVERE PRE-ECLAMPSIA IN THIRD TRIMESTER: Primary | ICD-10-CM

## 2025-01-16 PROCEDURE — 99999 PR PBB SHADOW E&M-EST. PATIENT-LVL III: CPT | Mod: PBBFAC,,, | Performed by: ADVANCED PRACTICE MIDWIFE

## 2025-01-16 PROCEDURE — 0503F POSTPARTUM CARE VISIT: CPT | Mod: ,,, | Performed by: ADVANCED PRACTICE MIDWIFE

## 2025-01-16 PROCEDURE — 99213 OFFICE O/P EST LOW 20 MIN: CPT | Mod: PBBFAC,TH | Performed by: ADVANCED PRACTICE MIDWIFE

## 2025-01-16 NOTE — PROGRESS NOTES
Subjective:      Maria De Jesus Payne is a 19 y.o. female who presents for one week BP check after decreasing Procardia 60XL last visit. She has been taking 1 pill a day and has been feeling great. She has no s/s of pre-e and her bleeding has also stopped.      Menstrual History:  OB History          1    Para   1    Term   0       1    AB   0    Living   1         SAB   0    IAB   0    Ectopic   0    Multiple   0    Live Births   1                  The following portions of the patient's history were reviewed and updated as appropriate: allergies, current medications, past family history, past medical history, past social history, past surgical history, and problem list.    Review of Systems  Pertinent items are noted in HPI.      Objective:      VSS, AAX3. No edema. BLE reflexes +2.      Assessment:      Gestational hypertension, routine postpartum.      Plan:      Decrease dose of procardia to 30mg XL Q D. F/U in 2 weeks for BP check and potentially stop medication.

## 2025-01-29 ENCOUNTER — POSTPARTUM VISIT (OUTPATIENT)
Dept: OBSTETRICS AND GYNECOLOGY | Facility: CLINIC | Age: 20
End: 2025-01-29
Payer: MEDICAID

## 2025-01-29 VITALS
HEIGHT: 67 IN | BODY MASS INDEX: 27.27 KG/M2 | DIASTOLIC BLOOD PRESSURE: 76 MMHG | SYSTOLIC BLOOD PRESSURE: 114 MMHG | WEIGHT: 173.75 LBS

## 2025-01-29 PROCEDURE — 99213 OFFICE O/P EST LOW 20 MIN: CPT | Mod: PBBFAC | Performed by: ADVANCED PRACTICE MIDWIFE

## 2025-01-29 PROCEDURE — 99999 PR PBB SHADOW E&M-EST. PATIENT-LVL III: CPT | Mod: PBBFAC,,, | Performed by: ADVANCED PRACTICE MIDWIFE

## 2025-01-29 NOTE — PROGRESS NOTES
Maria De Jesus Payne  here for BP check. She is 8.5 weeks postpartum. She has not taken her procardia in over 24 hours and her BP is stable. She has not had any s/s of elevated BP.     Past Medical History:   Diagnosis Date    ASCUS of cervix with negative high risk HPV 04/15/2024    Repeat pap in 6 months, 10/22/24    Irregular menstrual cycle     Labial cyst     lanced     No past surgical history on file.  Family History   Problem Relation Name Age of Onset    Diabetes Father      No Known Problems Mother       Review of patient's allergies indicates:  No Known Allergies  Social History     Socioeconomic History    Marital status: Single   Tobacco Use    Smoking status: Never     Passive exposure: Never    Smokeless tobacco: Never   Substance and Sexual Activity    Alcohol use: Never    Drug use: Not Currently    Sexual activity: Yes     Partners: Male     Social Drivers of Health     Financial Resource Strain: Low Risk  (2024)    Overall Financial Resource Strain (CARDIA)     Difficulty of Paying Living Expenses: Not very hard   Food Insecurity: No Food Insecurity (2024)    Hunger Vital Sign     Worried About Running Out of Food in the Last Year: Never true     Ran Out of Food in the Last Year: Never true   Transportation Needs: Low Risk  (2024)    Received from [facility] Postpartum Maternity Assessment     In the past 12 months, has lack of reliable transportation kept you from medical appointments, meetings, work or from getting things needed for daily living? CHOOSE ALL THAT APPLY: No   Physical Activity: Insufficiently Active (2024)    Exercise Vital Sign     Days of Exercise per Week: 5 days     Minutes of Exercise per Session: 20 min   Stress: No Stress Concern Present (2024)    Citizen of the [place] [employer] - Occupational Stress Questionnaire     Feeling of Stress : Only a little   Housing Stability: Unknown (2024)    Housing Stability Vital Sign      Unable to Pay for Housing in the Last Year: No       ROS:  GENERAL: No fever, chills, fatigability or weight loss.  VULVAR: No pain, no lesions and no itching.  VAGINAL: No relaxation, no itching, no discharge, no abnormal bleeding and no lesions.  ABDOMEN: No abdominal pain. Denies nausea. Denies vomiting. No diarrhea. No constipation  BREAST: Denies pain. No lumps. No discharge.  URINARY: No incontinence, no nocturia, no frequency and no dysuria.  CARDIOVASCULAR: No chest pain. No shortness of breath. No leg cramps.  NEUROLOGICAL: no headaches. No vision changes.      Vitals:    01/29/25 0822   BP: 114/76     GENERAL: healthy, alert, no distress        Maria De Jesus was seen today for postpartum care.    Diagnoses and all orders for this visit:    Routine postpartum follow-up      May stop procardia all together. Monitor BP at home with connected mom cuff. Notify us of any elevated BP or any s/s of elevated bp.

## 2025-02-07 NOTE — SUBJECTIVE & OBJECTIVE
GI Brief Note     Patient was post come down today for an upper endoscopy based on planned devised with family primary team and surgery.    When we called patient's family and surrogate decision maker Lexy to get consent for the procedure, her and I proceeded to have a long discussion with patient's status.    Lexy reported concern for patient's overall quality of life and the need for further invasive procedures for every complication that seems to be happening to the patient during her prolonged hospital stay.  She is not sure that this is in the patient's best interest and in alignment with the quality of life that the patient would want for herself.  She is worried that every invasive procedures seems to lead to more procedures down the line and does not know when this is all going to end.      She reflects on seeing the patient earlier this week and noting how frail and sick she was.  Reports spending a lot of time thinking that this is not how the patient would want to live.  She had a conversation earlier with palliative care and reports that that conversation has been sitting with her a lot and she has been thinking a lot about it.  After a lot of discussion she has decided that she does not want the patient to undergo any further invasive procedures.  She does not feel that this would be in accordance with the patient's quality of life desired.  She would like to discuss with primary team and palliative care regarding transitioning to more comfort based focuses and cares to keep the patient comfortable as opposed to putting her through any more tests and invasive procedures and treatments.    I validated the patient's feelings and told her that it is a very difficult situation and commended her on considering the patient's desired quality of life.    I have notified the primary team and palliative care continue this discussion and further decide how to proceed and what levels of care are still  Interval History: Admission for postpartum vaginal bleeding. Bleeding stable at this time. 1/4 pad full over 1.5 hours. She specfically denies fevers, chills, headaches, nausea, vomiting, shortness of breath, chest pain, abdominal pain, abnormal vaginal discharge, bowel or bladder changes, leg swelling.     Scheduled Meds:   ketorolac  30 mg Intravenous Q6H    NIFEdipine  60 mg Oral BID     Continuous Infusions:  PRN Meds:  Current Facility-Administered Medications:     ondansetron, 8 mg, Oral, Q8H PRN    sodium chloride 0.9%, 10 mL, Intravenous, PRN    Review of patient's allergies indicates:  No Known Allergies    Objective:     Vital Signs (Most Recent):  Temp: 97.7 °F (36.5 °C) (01/06/25 0746)  Pulse: 87 (01/06/25 0746)  Resp: 16 (01/06/25 0746)  BP: 112/65 (01/06/25 0746)  SpO2: 98 % (01/06/25 0209) Vital Signs (24h Range):  Temp:  [97.7 °F (36.5 °C)-98.6 °F (37 °C)] 97.7 °F (36.5 °C)  Pulse:  [] 87  Resp:  [15-22] 16  SpO2:  [98 %-100 %] 98 %  BP: (104-135)/(64-91) 112/65     Weight: 80.7 kg (177 lb 14.6 oz)  Body mass index is 27.86 kg/m².  Patient's last menstrual period was 04/04/2024.    I&O (Last 24H):    Intake/Output Summary (Last 24 hours) at 1/6/2025 1027  Last data filed at 1/5/2025 1913  Gross per 24 hour   Intake 1086.78 ml   Output --   Net 1086.78 ml         Laboratory:  Recent Lab Results         01/06/25  0435   01/05/25  1852   01/05/25  1430   01/05/25  1406        Albumin     4.1         ALP     71         ALT     24         Anion Gap     9         Appearance, UA       Cloudy       AST     19         Bacteria, UA       Rare       Baso # 0.04   0.02   0.02         Basophil % 0.6   0.3   0.3         Bilirubin (UA)       Negative       BILIRUBIN TOTAL     0.4  Comment: For infants and newborns, interpretation of results should be based  on gestational age, weight and in agreement with clinical  observations.    Premature Infant recommended reference ranges:  Up to 24  acceptable.    At this time GI will sign off as patient's family does not wish to proceed with any further invasive procedures even in the case of bloody stool.    Ralph De La Rosa MD  [facility]    hours.............<8.0 mg/dL  Up to 48 hours............<12.0 mg/dL  3-5 days..................<15.0 mg/dL  6-29 days.................<15.0 mg/dL           BUN     10         Calcium     10.0         Chloride     107         CO2     23         Color, UA       Red       Creatinine     0.9         Differential Method Automated   Automated   Automated         eGFR     >60         Eos # 0.3   0.1   0.2         Eos % 3.8   2.0   3.1         Glucose     101         Glucose, UA       Negative       Gran # (ANC) 3.3   3.9   3.5         Gran % 47.5   57.1   60.0         Group & Rh     O POS         Hematocrit 34.9   37.0   42.4         Hemoglobin 11.3   11.9   13.7         Hyaline Casts, UA       0       Immature Grans (Abs) 0.02  Comment: Mild elevation in immature granulocytes is non specific and   can be seen in a variety of conditions including stress response,   acute inflammation, trauma and pregnancy. Correlation with other   laboratory and clinical findings is essential.     0.01  Comment: Mild elevation in immature granulocytes is non specific and   can be seen in a variety of conditions including stress response,   acute inflammation, trauma and pregnancy. Correlation with other   laboratory and clinical findings is essential.     0.02  Comment: Mild elevation in immature granulocytes is non specific and   can be seen in a variety of conditions including stress response,   acute inflammation, trauma and pregnancy. Correlation with other   laboratory and clinical findings is essential.           Immature Granulocytes 0.3   0.1   0.3         INDIRECT MARELY     NEG         Ketones, UA       Negative       Leukocyte Esterase, UA       Negative       Lymph # 2.9   2.5   1.8         Lymph % 41.3   36.1   31.3         MCH 27.6   27.6   27.9         MCHC 32.4   32.2   32.3         MCV 85   86   86         Microscopic Comment       SEE COMMENT  Comment: Other formed elements not mentioned in the report are not   present in  the microscopic examination.          Mono # 0.5   0.3   0.3         Mono % 6.5   4.4   5.0         MPV 10.9   11.0   10.8         NITRITE UA       Negative       nRBC 0   0   0         Blood, UA       3+       pH, UA       6.0       Platelet Count 192   204   240         Potassium     3.9         hCG Qualitative, Urine       Negative       PROTEIN TOTAL     7.8         Protein, UA       2+  Comment: Recommend a 24 hour urine protein or a urine   protein/creatinine ratio if globulin induced proteinuria is  clinically suspected.         RBC 4.10   4.31   4.91         RBC, UA       >100       RDW 14.0   13.7   14.0         Sodium     139         Spec Grav UA       1.025       Specimen Outdate     01/08/2025 23:59         Specimen UA       Urine, Clean Catch       UROBILINOGEN UA       Negative       WBC, UA       0       WBC 7.04   6.89   5.81               I have personallly reviewed all pertinent lab results from the last 24 hours.    Diagnostic Results:  US: Reviewed       Physical Exam:   Constitutional: She is oriented to person, place, and time. She appears well-developed and well-nourished. No distress.       Cardiovascular:  Normal rate, regular rhythm and normal heart sounds.             Pulmonary/Chest: Effort normal and breath sounds normal.        Abdominal: Soft. Bowel sounds are normal. She exhibits no distension. There is no abdominal tenderness.     Genitourinary:    Genitourinary Comments: No active vaginal bleeding. Last pad changed about 1.5 hours ago and is currently 1/4 full.             Musculoskeletal: Normal range of motion and moves all extremeties. No tenderness or edema.       Neurological: She is alert and oriented to person, place, and time.    Skin: Skin is warm and dry.    Psychiatric: She has a normal mood and affect. Her behavior is normal. Thought content normal.        Review of Systems   Constitutional:  Negative for chills, diaphoresis, fatigue and fever.   Respiratory:  Negative  for shortness of breath.    Cardiovascular:  Negative for chest pain.   Gastrointestinal:  Negative for abdominal pain, constipation, diarrhea, nausea and vomiting.   Genitourinary:  Positive for vaginal bleeding. Negative for flank pain and pelvic pain.   Musculoskeletal:  Negative for back pain.

## 2025-05-09 ENCOUNTER — LAB VISIT (OUTPATIENT)
Dept: LAB | Facility: HOSPITAL | Age: 20
End: 2025-05-09
Attending: INTERNAL MEDICINE
Payer: MEDICAID

## 2025-05-09 DIAGNOSIS — O99.019 ANTEPARTUM ANEMIA: ICD-10-CM

## 2025-05-09 LAB
ABSOLUTE EOSINOPHIL (OHS): 0.01 K/UL
ABSOLUTE MONOCYTE (OHS): 1.17 K/UL (ref 0.3–1)
ABSOLUTE NEUTROPHIL COUNT (OHS): 11.19 K/UL (ref 1.8–7.7)
BASOPHILS # BLD AUTO: 0.04 K/UL
BASOPHILS NFR BLD AUTO: 0.3 %
ERYTHROCYTE [DISTWIDTH] IN BLOOD BY AUTOMATED COUNT: 14.6 % (ref 11.5–14.5)
FERRITIN SERPL-MCNC: 11 NG/ML (ref 20–300)
HCT VFR BLD AUTO: 35.9 % (ref 37–48.5)
HGB BLD-MCNC: 11 GM/DL (ref 12–16)
IMM GRANULOCYTES # BLD AUTO: 0.06 K/UL (ref 0–0.04)
IMM GRANULOCYTES NFR BLD AUTO: 0.4 % (ref 0–0.5)
LYMPHOCYTES # BLD AUTO: 3.41 K/UL (ref 1–4.8)
MCH RBC QN AUTO: 25.1 PG (ref 27–31)
MCHC RBC AUTO-ENTMCNC: 30.6 G/DL (ref 32–36)
MCV RBC AUTO: 82 FL (ref 82–98)
NUCLEATED RBC (/100WBC) (OHS): 0 /100 WBC
PLATELET # BLD AUTO: 379 K/UL (ref 150–450)
PMV BLD AUTO: 11.2 FL (ref 9.2–12.9)
RBC # BLD AUTO: 4.38 M/UL (ref 4–5.4)
RELATIVE EOSINOPHIL (OHS): 0.1 %
RELATIVE LYMPHOCYTE (OHS): 21.5 % (ref 18–48)
RELATIVE MONOCYTE (OHS): 7.4 % (ref 4–15)
RELATIVE NEUTROPHIL (OHS): 70.3 % (ref 38–73)
WBC # BLD AUTO: 15.88 K/UL (ref 3.9–12.7)

## 2025-05-09 PROCEDURE — 82728 ASSAY OF FERRITIN: CPT

## 2025-05-09 PROCEDURE — 85025 COMPLETE CBC W/AUTO DIFF WBC: CPT

## 2025-05-09 PROCEDURE — 36415 COLL VENOUS BLD VENIPUNCTURE: CPT

## 2025-05-12 ENCOUNTER — OFFICE VISIT (OUTPATIENT)
Dept: HEMATOLOGY/ONCOLOGY | Facility: CLINIC | Age: 20
End: 2025-05-12
Payer: MEDICAID

## 2025-05-12 DIAGNOSIS — D50.0 IRON DEFICIENCY ANEMIA DUE TO CHRONIC BLOOD LOSS: Primary | ICD-10-CM

## 2025-05-12 PROCEDURE — 98006 SYNCH AUDIO-VIDEO EST MOD 30: CPT | Mod: 95,,, | Performed by: INTERNAL MEDICINE

## 2025-05-12 NOTE — PROGRESS NOTES
The patient location is: home  Visit type: Virtual visit with synchronous audio and video  Face-to-face or time spent with patient on the encounter: 25 min  Total time spent on and for  this encounter which includes non face-to-face time preparing to see patient, review of tests, obtaining and or reviewing separately obtained records documenting clinical information in the electronic or other health records, independently interpreting results which is not separately reported ,and communicating results to the patient/family/caregiver and in care coordination and treatment planning/communicating with pharmacy for prescriptions/addressing social needs/arranging follow-up and or referrals : 25 min     Each patient I provide medical services by telemedicine is:  (1) informed of the relationship between the physician and patient and the respective role of any other health care provider with respect to management of the patient; and (2) notified that he or she may decline to receive medical services by telemedicine and may withdraw from such care at any time.  This is a video visit therefore some elements of the physical exam such as vital signs, heart sounds are breath sounds are not included and may be included if found in recent clinic notes of other providers assessing same patient. Any symptoms or signs that were visualized were stated by the patient may be included in this note.      Service Date:  5/12/25    Chief Complaint: iron deficiency anemia    Maria De Jesus Payne is a 20 y.o. female here with iron deficiency anemia secondary to pregnancy originally but she has since delivered.  Now having menorrhagia.  She was taking her iron gummies but not consistently.  Also has not taken him for last 2 weeks.  While she was taking him it was not daily.    Review of Systems   Constitutional: Negative.  Negative for appetite change and unexpected weight change.   HENT: Negative.  Negative for mouth sores.    Eyes:  Negative.  Negative for visual disturbance.   Respiratory: Negative.  Negative for cough and shortness of breath.    Cardiovascular: Negative.  Negative for chest pain.   Gastrointestinal: Negative.  Negative for abdominal pain and diarrhea.   Endocrine: Negative.    Genitourinary:  Positive for frequency.   Musculoskeletal: Negative.  Negative for back pain.   Integumentary:  Negative for rash. Negative.   Neurological: Negative.  Negative for headaches.   Hematological: Negative.  Negative for adenopathy.   Psychiatric/Behavioral: Negative.  The patient is not nervous/anxious.         No current outpatient medications       Past Medical History:   Diagnosis Date    ASCUS of cervix with negative high risk HPV 04/15/2024    Repeat pap in 6 months, 10/22/24    Irregular menstrual cycle     Labial cyst 2022    lanced        No past surgical history on file.     Family History   Problem Relation Name Age of Onset    Diabetes Father      No Known Problems Mother         Social History     Tobacco Use    Smoking status: Never     Passive exposure: Never    Smokeless tobacco: Never   Substance Use Topics    Alcohol use: Never    Drug use: Not Currently         There were no vitals filed for this visit.     Physical Exam:  There were no vitals taken for this visit.    Physical Exam  Constitutional:       Appearance: Normal appearance.   HENT:      Head: Normocephalic and atraumatic.      Nose: Nose normal.      Mouth/Throat:      Mouth: Mucous membranes are moist.      Pharynx: Oropharynx is clear.   Eyes:      Conjunctiva/sclera: Conjunctivae normal.   Cardiovascular:      Rate and Rhythm: Normal rate and regular rhythm.      Heart sounds: Normal heart sounds.   Pulmonary:      Effort: Pulmonary effort is normal.      Breath sounds: Normal breath sounds.   Abdominal:      General: Abdomen is flat. Bowel sounds are normal.      Palpations: Abdomen is soft.   Musculoskeletal:         General: Normal range of motion.       Cervical back: Normal range of motion and neck supple.   Skin:     General: Skin is warm and dry.   Neurological:      General: No focal deficit present.      Mental Status: She is alert and oriented to person, place, and time. Mental status is at baseline.   Psychiatric:         Mood and Affect: Mood normal.          Labs:  Lab Results   Component Value Date    WBC 15.88 (H) 05/09/2025    RBC 4.38 05/09/2025    HGB 11.0 (L) 05/09/2025    HCT 35.9 (L) 05/09/2025    MCV 82 05/09/2025    MCH 25.1 (L) 05/09/2025    MCHC 30.6 (L) 05/09/2025    RDW 14.6 (H) 05/09/2025     05/09/2025    MPV 11.2 05/09/2025    GRAN 2.4 01/06/2025    GRAN 47.4 01/06/2025    LYMPH 21.5 05/09/2025    LYMPH 3.41 05/09/2025    MONO 7.4 05/09/2025    MONO 1.17 (H) 05/09/2025    EOS 0.1 05/09/2025    EOS 0.01 05/09/2025    BASO 0.02 01/06/2025    EOSINOPHIL 3.4 01/06/2025    BASOPHIL 0.3 05/09/2025    BASOPHIL 0.04 05/09/2025     Sodium   Date Value Ref Range Status   01/05/2025 139 136 - 145 mmol/L Final     Potassium   Date Value Ref Range Status   01/05/2025 3.9 3.5 - 5.1 mmol/L Final     Chloride   Date Value Ref Range Status   01/05/2025 107 95 - 110 mmol/L Final     CO2   Date Value Ref Range Status   01/05/2025 23 23 - 29 mmol/L Final     Glucose   Date Value Ref Range Status   01/05/2025 101 70 - 110 mg/dL Final     BUN   Date Value Ref Range Status   01/05/2025 10 6 - 20 mg/dL Final     Creatinine   Date Value Ref Range Status   01/05/2025 0.9 0.5 - 1.4 mg/dL Final     Calcium   Date Value Ref Range Status   01/05/2025 10.0 8.7 - 10.5 mg/dL Final     Total Protein   Date Value Ref Range Status   01/05/2025 7.8 6.0 - 8.4 g/dL Final     Albumin   Date Value Ref Range Status   01/05/2025 4.1 3.5 - 5.2 g/dL Final     Total Bilirubin   Date Value Ref Range Status   01/05/2025 0.4 0.1 - 1.0 mg/dL Final     Comment:     For infants and newborns, interpretation of results should be based  on gestational age, weight and in agreement with  clinical  observations.    Premature Infant recommended reference ranges:  Up to 24 hours.............<8.0 mg/dL  Up to 48 hours............<12.0 mg/dL  3-5 days..................<15.0 mg/dL  6-29 days.................<15.0 mg/dL       Alkaline Phosphatase   Date Value Ref Range Status   01/05/2025 71 40 - 150 U/L Final     AST   Date Value Ref Range Status   01/05/2025 19 10 - 40 U/L Final     ALT   Date Value Ref Range Status   01/05/2025 24 10 - 44 U/L Final     Anion Gap   Date Value Ref Range Status   01/05/2025 9 8 - 16 mmol/L Final     eGFR if    Date Value Ref Range Status   08/11/2020 SEE COMMENT >60 mL/min/1.73 m^2 Final     eGFR if non    Date Value Ref Range Status   08/11/2020 SEE COMMENT >60 mL/min/1.73 m^2 Final     Comment:     Calculation used to obtain the estimated glomerular filtration  rate (eGFR) is the CKD-EPI equation.   Test not performed.  GFR calculation is only valid for patients   18 and older.         A/P:    Iron deficiency anemia  -was previously due to pregnancy but now patient has menorrhagia since delivering.  -patient had not been taking her gummies so I will get her back on iron gummies as she does not want to try an IV iron infusion at this time.  She will take this for 3 months I will recheck.  At that time if she is not improved then we can give an IV iron infusion.      Aurash Khoobehi, MD  Hematology and Oncology

## 2025-08-06 ENCOUNTER — TELEPHONE (OUTPATIENT)
Dept: HEMATOLOGY/ONCOLOGY | Facility: CLINIC | Age: 20
End: 2025-08-06
Payer: MEDICAID

## 2025-08-06 NOTE — TELEPHONE ENCOUNTER
Spoke with pt to r/s lab appt.    ----- Message from Nicolle sent at 8/6/2025 11:19 AM CDT -----  Pt is asking if she needs to r/s lab appt 08/08?     [phone]

## 2025-08-27 ENCOUNTER — TELEPHONE (OUTPATIENT)
Dept: HEMATOLOGY/ONCOLOGY | Facility: CLINIC | Age: 20
End: 2025-08-27
Payer: MEDICAID

## 2025-08-27 ENCOUNTER — PATIENT MESSAGE (OUTPATIENT)
Dept: HEMATOLOGY/ONCOLOGY | Facility: CLINIC | Age: 20
End: 2025-08-27
Payer: MEDICAID

## 2025-08-29 ENCOUNTER — LAB VISIT (OUTPATIENT)
Dept: LAB | Facility: HOSPITAL | Age: 20
End: 2025-08-29
Attending: INTERNAL MEDICINE
Payer: MEDICAID

## 2025-08-29 DIAGNOSIS — D50.0 IRON DEFICIENCY ANEMIA DUE TO CHRONIC BLOOD LOSS: ICD-10-CM

## 2025-08-29 LAB
ABSOLUTE EOSINOPHIL (OHS): 0.18 K/UL
ABSOLUTE MONOCYTE (OHS): 0.47 K/UL (ref 0.3–1)
ABSOLUTE NEUTROPHIL COUNT (OHS): 3.65 K/UL (ref 1.8–7.7)
BASOPHILS # BLD AUTO: 0.03 K/UL
BASOPHILS NFR BLD AUTO: 0.4 %
ERYTHROCYTE [DISTWIDTH] IN BLOOD BY AUTOMATED COUNT: 15.4 % (ref 11.5–14.5)
FERRITIN SERPL-MCNC: 18 NG/ML (ref 20–300)
HCT VFR BLD AUTO: 37.1 % (ref 37–48.5)
HGB BLD-MCNC: 11.4 GM/DL (ref 12–16)
IMM GRANULOCYTES # BLD AUTO: 0.01 K/UL (ref 0–0.04)
IMM GRANULOCYTES NFR BLD AUTO: 0.1 % (ref 0–0.5)
LYMPHOCYTES # BLD AUTO: 2.6 K/UL (ref 1–4.8)
MCH RBC QN AUTO: 25.8 PG (ref 27–31)
MCHC RBC AUTO-ENTMCNC: 30.7 G/DL (ref 32–36)
MCV RBC AUTO: 84 FL (ref 82–98)
NUCLEATED RBC (/100WBC) (OHS): 0 /100 WBC
PLATELET # BLD AUTO: 325 K/UL (ref 150–450)
PMV BLD AUTO: 10.5 FL (ref 9.2–12.9)
RBC # BLD AUTO: 4.42 M/UL (ref 4–5.4)
RELATIVE EOSINOPHIL (OHS): 2.6 %
RELATIVE LYMPHOCYTE (OHS): 37.5 % (ref 18–48)
RELATIVE MONOCYTE (OHS): 6.8 % (ref 4–15)
RELATIVE NEUTROPHIL (OHS): 52.6 % (ref 38–73)
WBC # BLD AUTO: 6.94 K/UL (ref 3.9–12.7)

## 2025-08-29 PROCEDURE — 82728 ASSAY OF FERRITIN: CPT

## 2025-08-29 PROCEDURE — 36415 COLL VENOUS BLD VENIPUNCTURE: CPT

## 2025-08-29 PROCEDURE — 85025 COMPLETE CBC W/AUTO DIFF WBC: CPT

## 2025-09-03 ENCOUNTER — OFFICE VISIT (OUTPATIENT)
Dept: HEMATOLOGY/ONCOLOGY | Facility: CLINIC | Age: 20
End: 2025-09-03
Payer: MEDICAID

## 2025-09-03 DIAGNOSIS — D50.0 IRON DEFICIENCY ANEMIA DUE TO CHRONIC BLOOD LOSS: Primary | ICD-10-CM

## 2025-09-03 RX ORDER — FERROUS SULFATE 325(65) MG
325 TABLET ORAL
Qty: 30 TABLET | Refills: 3 | Status: SHIPPED | OUTPATIENT
Start: 2025-09-03

## 2025-09-04 DIAGNOSIS — D50.0 IRON DEFICIENCY ANEMIA DUE TO CHRONIC BLOOD LOSS: Primary | ICD-10-CM
